# Patient Record
Sex: FEMALE | Race: WHITE | NOT HISPANIC OR LATINO | Employment: UNEMPLOYED | URBAN - METROPOLITAN AREA
[De-identification: names, ages, dates, MRNs, and addresses within clinical notes are randomized per-mention and may not be internally consistent; named-entity substitution may affect disease eponyms.]

---

## 2019-12-22 ENCOUNTER — APPOINTMENT (EMERGENCY)
Dept: CT IMAGING | Facility: HOSPITAL | Age: 54
End: 2019-12-22
Payer: MEDICARE

## 2019-12-22 ENCOUNTER — HOSPITAL ENCOUNTER (EMERGENCY)
Facility: HOSPITAL | Age: 54
Discharge: HOME | End: 2019-12-22
Attending: EMERGENCY MEDICINE | Admitting: EMERGENCY MEDICINE
Payer: MEDICARE

## 2019-12-22 VITALS
HEART RATE: 85 BPM | DIASTOLIC BLOOD PRESSURE: 58 MMHG | WEIGHT: 185.85 LBS | OXYGEN SATURATION: 94 % | HEIGHT: 67 IN | BODY MASS INDEX: 29.17 KG/M2 | TEMPERATURE: 98.4 F | RESPIRATION RATE: 16 BRPM | SYSTOLIC BLOOD PRESSURE: 124 MMHG
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
## 2019-12-22 DIAGNOSIS — R42 DIZZINESS: ICD-10-CM

## 2019-12-22 LAB
ALBUMIN SERPL BCP-MCNC: 3.8 G/DL (ref 3.5–5)
ALP SERPL-CCNC: 69 U/L (ref 46–116)
ALT SERPL W P-5'-P-CCNC: 13 U/L (ref 12–78)
ANION GAP SERPL CALCULATED.3IONS-SCNC: 12 MMOL/L (ref 4–13)
AST SERPL W P-5'-P-CCNC: 10 U/L (ref 5–45)
ATRIAL RATE: 107 BPM
BACTERIA UR QL AUTO: ABNORMAL /HPF
BASOPHILS # BLD AUTO: 0.04 THOUSANDS/ΜL (ref 0–0.1)
BASOPHILS NFR BLD AUTO: 1 % (ref 0–1)
BILIRUB SERPL-MCNC: 0.4 MG/DL (ref 0.2–1)
BILIRUB UR QL STRIP: NEGATIVE
BUN SERPL-MCNC: 14 MG/DL (ref 5–25)
CALCIUM SERPL-MCNC: 9.1 MG/DL (ref 8.3–10.1)
CHLORIDE SERPL-SCNC: 102 MMOL/L (ref 100–108)
CLARITY UR: CLEAR
CO2 SERPL-SCNC: 25 MMOL/L (ref 21–32)
COLOR UR: YELLOW
CREAT SERPL-MCNC: 0.78 MG/DL (ref 0.6–1.3)
EOSINOPHIL # BLD AUTO: 0.1 THOUSAND/ΜL (ref 0–0.61)
EOSINOPHIL NFR BLD AUTO: 1 % (ref 0–6)
ERYTHROCYTE [DISTWIDTH] IN BLOOD BY AUTOMATED COUNT: 12.9 % (ref 11.6–15.1)
ETHANOL SERPL-MCNC: <3 MG/DL (ref 0–3)
FLUAV RNA NPH QL NAA+PROBE: NORMAL
FLUBV RNA NPH QL NAA+PROBE: NORMAL
GFR SERPL CREATININE-BSD FRML MDRD: 85 ML/MIN/1.73SQ M
GLUCOSE SERPL-MCNC: 84 MG/DL (ref 65–140)
GLUCOSE UR STRIP-MCNC: NEGATIVE MG/DL
HCT VFR BLD AUTO: 38 % (ref 34.8–46.1)
HGB BLD-MCNC: 12.2 G/DL (ref 11.5–15.4)
HGB UR QL STRIP.AUTO: ABNORMAL
IMM GRANULOCYTES # BLD AUTO: 0.03 THOUSAND/UL (ref 0–0.2)
IMM GRANULOCYTES NFR BLD AUTO: 0 % (ref 0–2)
KETONES UR STRIP-MCNC: NEGATIVE MG/DL
LEUKOCYTE ESTERASE UR QL STRIP: NEGATIVE
LYMPHOCYTES # BLD AUTO: 2.01 THOUSANDS/ΜL (ref 0.6–4.47)
LYMPHOCYTES NFR BLD AUTO: 27 % (ref 14–44)
MCH RBC QN AUTO: 32.1 PG (ref 26.8–34.3)
MCHC RBC AUTO-ENTMCNC: 32.1 G/DL (ref 31.4–37.4)
MCV RBC AUTO: 100 FL (ref 82–98)
MONOCYTES # BLD AUTO: 0.64 THOUSAND/ΜL (ref 0.17–1.22)
MONOCYTES NFR BLD AUTO: 9 % (ref 4–12)
NEUTROPHILS # BLD AUTO: 4.58 THOUSANDS/ΜL (ref 1.85–7.62)
NEUTS SEG NFR BLD AUTO: 62 % (ref 43–75)
NITRITE UR QL STRIP: NEGATIVE
NON-SQ EPI CELLS URNS QL MICRO: ABNORMAL /HPF
NRBC BLD AUTO-RTO: 0 /100 WBCS
NT-PROBNP SERPL-MCNC: 138 PG/ML
P AXIS: 67 DEGREES
PH UR STRIP.AUTO: 5 [PH]
PLATELET # BLD AUTO: 360 THOUSANDS/UL (ref 149–390)
PMV BLD AUTO: 9.6 FL (ref 8.9–12.7)
POTASSIUM SERPL-SCNC: 3.4 MMOL/L (ref 3.5–5.3)
PR INTERVAL: 124 MS
PROT SERPL-MCNC: 8 G/DL (ref 6.4–8.2)
PROT UR STRIP-MCNC: NEGATIVE MG/DL
QRS AXIS: 66 DEGREES
QRSD INTERVAL: 76 MS
QT INTERVAL: 352 MS
QTC INTERVAL: 469 MS
RBC # BLD AUTO: 3.8 MILLION/UL (ref 3.81–5.12)
RBC #/AREA URNS AUTO: ABNORMAL /HPF
RSV RNA NPH QL NAA+PROBE: NORMAL
S PYO DNA THROAT QL NAA+PROBE: NORMAL
SODIUM SERPL-SCNC: 139 MMOL/L (ref 136–145)
SP GR UR STRIP.AUTO: <=1.005 (ref 1–1.03)
T WAVE AXIS: 48 DEGREES
TROPONIN I SERPL-MCNC: <0.02 NG/ML
UROBILINOGEN UR QL STRIP.AUTO: 0.2 E.U./DL
VENTRICULAR RATE: 107 BPM
WBC # BLD AUTO: 7.4 THOUSAND/UL (ref 4.31–10.16)
WBC #/AREA URNS AUTO: ABNORMAL /HPF

## 2019-12-22 PROCEDURE — 71260 CT THORAX DX C+: CPT

## 2019-12-22 PROCEDURE — 87651 STREP A DNA AMP PROBE: CPT | Performed by: EMERGENCY MEDICINE

## 2019-12-22 PROCEDURE — 72125 CT NECK SPINE W/O DYE: CPT

## 2019-12-22 PROCEDURE — 80320 DRUG SCREEN QUANTALCOHOLS: CPT | Performed by: EMERGENCY MEDICINE

## 2019-12-22 PROCEDURE — 85025 COMPLETE CBC W/AUTO DIFF WBC: CPT | Performed by: EMERGENCY MEDICINE

## 2019-12-22 PROCEDURE — 84484 ASSAY OF TROPONIN QUANT: CPT | Performed by: EMERGENCY MEDICINE

## 2019-12-22 PROCEDURE — 83880 ASSAY OF NATRIURETIC PEPTIDE: CPT | Performed by: EMERGENCY MEDICINE

## 2019-12-22 PROCEDURE — 99284 EMERGENCY DEPT VISIT MOD MDM: CPT

## 2019-12-22 PROCEDURE — 36415 COLL VENOUS BLD VENIPUNCTURE: CPT | Performed by: EMERGENCY MEDICINE

## 2019-12-22 PROCEDURE — 74177 CT ABD & PELVIS W/CONTRAST: CPT

## 2019-12-22 PROCEDURE — 81001 URINALYSIS AUTO W/SCOPE: CPT | Performed by: EMERGENCY MEDICINE

## 2019-12-22 PROCEDURE — 99285 EMERGENCY DEPT VISIT HI MDM: CPT | Performed by: EMERGENCY MEDICINE

## 2019-12-22 PROCEDURE — 87631 RESP VIRUS 3-5 TARGETS: CPT | Performed by: EMERGENCY MEDICINE

## 2019-12-22 PROCEDURE — 96360 HYDRATION IV INFUSION INIT: CPT

## 2019-12-22 PROCEDURE — 70450 CT HEAD/BRAIN W/O DYE: CPT

## 2019-12-22 PROCEDURE — 93005 ELECTROCARDIOGRAM TRACING: CPT

## 2019-12-22 PROCEDURE — 93010 ELECTROCARDIOGRAM REPORT: CPT | Performed by: INTERNAL MEDICINE

## 2019-12-22 PROCEDURE — 80053 COMPREHEN METABOLIC PANEL: CPT | Performed by: EMERGENCY MEDICINE

## 2019-12-22 RX ORDER — HYDROXYZINE HYDROCHLORIDE 25 MG/1
25 TABLET, FILM COATED ORAL ONCE
Status: COMPLETED | OUTPATIENT
Start: 2019-12-22 | End: 2019-12-22

## 2019-12-22 RX ORDER — POTASSIUM CHLORIDE 20 MEQ/1
20 TABLET, EXTENDED RELEASE ORAL ONCE
Status: COMPLETED | OUTPATIENT
Start: 2019-12-22 | End: 2019-12-22

## 2019-12-22 RX ADMIN — POTASSIUM CHLORIDE 20 MEQ: 1500 TABLET, EXTENDED RELEASE ORAL at 19:09

## 2019-12-22 RX ADMIN — HYDROXYZINE HYDROCHLORIDE 25 MG: 25 TABLET ORAL at 18:13

## 2019-12-22 RX ADMIN — SODIUM CHLORIDE 1000 ML: 0.9 INJECTION, SOLUTION INTRAVENOUS at 17:56

## 2019-12-22 RX ADMIN — IOHEXOL 100 ML: 350 INJECTION, SOLUTION INTRAVENOUS at 19:10

## 2019-12-22 NOTE — ED NOTES
Pt reports that she has been "sneezing out worms" and that they are all over her house   Pt states "I could blow my nose and blow out 4 at a time "      [address], RN  12/22/19 6978

## 2019-12-22 NOTE — ED PROVIDER NOTES
History  Chief Complaint   Patient presents with    Fall     pt post dizziness and fall earlier today, pt denies thinners, unsure of the head injury "i have worms in my mouth and throat" , pt co pain in neck and head        57-year-old female presents with dizziness and a fall earlier today  Patient has had some confusion over the past few months  Patient's  states that she will wander, get confused, is very forgetful  This is not an acute change  Earlier today she had dizziness and had a fall  She is presenting with head   And neck pain  She has no chest pain  Patient is also complaining she has worms in her chest and is coughing them up   states that these "worms "are mucus   From nasal congestion  She has no fevers or chills  No recent travel  No sick contacts  None       Past Medical History:   Diagnosis Date    Chronic back pain     Hypertension     Migraine     Non Hodgkin's lymphoma ([facility] [address] )        History reviewed  No pertinent surgical history  History reviewed  No pertinent family history  I have reviewed and agree with the history as documented  Social History     Tobacco Use    Smoking status: Never Smoker    Smokeless tobacco: Never Used   Substance Use Topics    Alcohol use: Not Currently    Drug use: Never        Review of Systems   Constitutional: Negative for chills, fatigue and fever  HENT: Negative for congestion and sore throat  Respiratory: Positive for cough  Negative for chest tightness, shortness of breath and wheezing  Cardiovascular: Negative for chest pain and palpitations  Gastrointestinal: Negative for abdominal pain, constipation, diarrhea, nausea and vomiting  Genitourinary: Negative for dysuria and flank pain  Musculoskeletal: Positive for neck pain  Negative for back pain  Skin: Negative for color change and rash  Allergic/Immunologic: Negative for immunocompromised state     Neurological: Positive for dizziness, light-headedness and headaches  Negative for seizures, syncope, facial asymmetry, weakness and numbness  Psychiatric/Behavioral: Positive for confusion and hallucinations  Physical Exam  Physical Exam   Constitutional: She is oriented to person, place, and time  She appears well-developed and well-nourished  No distress  HENT:   Head: Normocephalic and atraumatic  Mouth/Throat: Oropharynx is clear and moist    No external evidence of trauma  No hematotympanum  Eyes: Pupils are equal, round, and reactive to light  Conjunctivae and EOM are normal  Right eye exhibits no discharge  Left eye exhibits no discharge  No scleral icterus  Neck: Normal range of motion  Neck supple  No JVD present  Midline cervical tenderness noted  No step-offs  Cardiovascular: Normal rate, regular rhythm, normal heart sounds and intact distal pulses  Exam reveals no gallop and no friction rub  No murmur heard  Pulmonary/Chest: Effort normal and breath sounds normal  No respiratory distress  She has no wheezes  She has no rales  She exhibits no tenderness  No rhonchi  Abdominal: Soft  Bowel sounds are normal  She exhibits no distension  There is no tenderness  There is no guarding  Pelvis is stable, nontender  Musculoskeletal: Normal range of motion  She exhibits no edema, tenderness or deformity  Neurological: She is alert and oriented to person, place, and time  No cranial nerve deficit or sensory deficit  Skin: Skin is warm and dry  No rash noted  She is not diaphoretic  No erythema  No pallor  Psychiatric:   Patient continues to state that she has worms that she is coughing up  These are not noted on physical exam    Vitals reviewed        Vital Signs  ED Triage Vitals   Temperature Pulse Respirations Blood Pressure SpO2   12/22/19 1739 12/22/19 1732 12/22/19 1732 12/22/19 1732 12/22/19 1732   98 4 °F (36 9 °C) (!) 110 17 150/68 96 %      Temp Source Heart Rate Source Patient Position - Orthostatic VS BP Location FiO2 (%)   12/22/19 1732 12/22/19 1732 12/22/19 1732 12/22/19 1732 --   Oral Monitor Sitting Right arm       Pain Score       12/22/19 1915       No Pain           Vitals:    12/22/19 1800 12/22/19 1830 12/22/19 1915 12/22/19 2000   BP: 137/63 147/60 146/70 124/58   Pulse: 89 84 89 85   Patient Position - Orthostatic VS:             Visual Acuity  Visual Acuity      Most Recent Value   L Pupil Size (mm)  3   R Pupil Size (mm)  3          ED Medications  Medications   sodium chloride 0 9 % bolus 1,000 mL (0 mL Intravenous Stopped 12/22/19 1856)   hydrOXYzine HCL (ATARAX) tablet 25 mg (25 mg Oral Given 12/22/19 1813)   potassium chloride (K-DUR,KLOR-CON) CR tablet 20 mEq (20 mEq Oral Given 12/22/19 1909)   iohexol (OMNIPAQUE) 350 MG/ML injection (MULTI-DOSE) 100 mL (100 mL Intravenous Given 12/22/19 1910)       Diagnostic Studies  Results Reviewed     Procedure Component Value Units Date/Time    Urine Microscopic [[id number]]  (Abnormal) Collected:  12/22/19 1945    Lab Status:  Final result Specimen:  Urine, Clean Catch Updated:  12/22/19 2007     RBC, UA 2-4 /hpf      WBC, UA 2-4 /hpf      Epithelial Cells Occasional /hpf      Bacteria, UA None Seen /hpf     UA w Reflex to Microscopic w Reflex to Culture [[id number]]  (Abnormal) Collected:  12/22/19 1945    Lab Status:  Final result Specimen:  Urine, Clean Catch Updated:  12/22/19 1957     Color, UA Yellow     Clarity, UA Clear     Specific Gravity, UA <=1 005     pH, UA 5 0     Leukocytes, UA Negative     Nitrite, UA Negative     Protein, UA Negative mg/dl      Glucose, UA Negative mg/dl      Ketones, UA Negative mg/dl      Urobilinogen, UA 0 2 E U /dl      Bilirubin, UA Negative     Blood, UA Trace-Intact    Influenza A/B and RSV PCR [[id number]]  (Normal) Collected:  12/22/19 1750    Lab Status:  Final result Specimen:  Nose Updated:  12/22/19 1855     INFLUENZA A PCR None Detected     INFLUENZA B PCR None Detected     RSV PCR None Detected    Strep A PCR [[id number]]  (Normal) Collected:  12/22/19 1750    Lab Status:  Final result Specimen:  Throat Updated:  12/22/19 1854     STREP A PCR None Detected    NT-BNP PRO [[id number]]  (Abnormal) Collected:  12/22/19 1751    Lab Status:  Final result Specimen:  Blood from Arm, Left Updated:  12/22/19 1827     NT-proBNP 138 pg/mL     Troponin I [[id number]]  (Normal) Collected:  12/22/19 1751    Lab Status:  Final result Specimen:  Blood from Arm, Left Updated:  12/22/19 1821     Troponin I <0 02 ng/mL     Comprehensive metabolic panel [[id number]]  (Abnormal) Collected:  12/22/19 1751    Lab Status:  Final result Specimen:  Blood from Arm, Left Updated:  12/22/19 1820     Sodium 139 mmol/L      Potassium 3 4 mmol/L      Chloride 102 mmol/L      CO2 25 mmol/L      ANION GAP 12 mmol/L      BUN 14 mg/dL      Creatinine 0 78 mg/dL      Glucose 84 mg/dL      Calcium 9 1 mg/dL      AST 10 U/L      ALT 13 U/L      Alkaline Phosphatase 69 U/L      Total Protein 8 0 g/dL      Albumin 3 8 g/dL      Total Bilirubin 0 40 mg/dL      eGFR 85 ml/min/1 73sq m     Narrative:       [place] guidelines for Chronic Kidney Disease (CKD):     Stage 1 with normal or high GFR (GFR > 90 mL/min/1 73 square meters)    Stage 2 Mild CKD (GFR = 60-89 mL/min/1 73 square meters)    Stage 3A Moderate CKD (GFR = 45-59 mL/min/1 73 square meters)    Stage 3B Moderate CKD (GFR = 30-44 mL/min/1 73 square meters)    Stage 4 Severe CKD (GFR = 15-29 mL/min/1 73 square meters)    Stage 5 End Stage CKD (GFR <15 mL/min/1 73 square meters)  Note: GFR calculation is accurate only with a steady state creatinine    Ethanol [[id number]]  (Normal) Collected:  12/22/19 1752    Lab Status:  Final result Specimen:  Blood from Arm, Left Updated:  12/22/19 1815     Ethanol Lvl <3 mg/dL     CBC and differential [[id number]]  (Abnormal) Collected:  12/22/19 1751    Lab Status:  Final result Specimen:  Blood from Arm, Left Updated:  12/22/19 1801 WBC 7 40 Thousand/uL      RBC 3 80 Million/uL      Hemoglobin 12 2 g/dL      Hematocrit 38 0 %       fL      MCH 32 1 pg      MCHC 32 1 g/dL      RDW 12 9 %      MPV 9 6 fL      Platelets 542 Thousands/uL      nRBC 0 /100 WBCs      Neutrophils Relative 62 %      Immat GRANS % 0 %      Lymphocytes Relative 27 %      Monocytes Relative 9 %      Eosinophils Relative 1 %      Basophils Relative 1 %      Neutrophils Absolute 4 58 Thousands/µL      Immature Grans Absolute 0 03 Thousand/uL      Lymphocytes Absolute 2 01 Thousands/µL      Monocytes Absolute 0 64 Thousand/µL      Eosinophils Absolute 0 10 Thousand/µL      Basophils Absolute 0 04 Thousands/µL                  CT head without contrast   ED Interpretation by Marcus Knapp DO (12/22 1939)   No acute intracranial abnormality  Final Result by Faby Sanz DO (12/22 1932)      No acute intracranial abnormality  Workstation performed: [id number]         CT spine cervical without contrast   ED Interpretation by Marcus Knapp DO (12/22 1939)   No cervical spine fracture or traumatic malalignment  Final Result by Faby Sanz DO (12/22 1937)      No cervical spine fracture or traumatic malalignment  Workstation performed: [id number]         CT chest abdomen pelvis w contrast   ED Interpretation by Marcus Knapp DO (12/22 2009)   1  No acute traumatic injury identified within the chest, abdomen or pelvis        2  Intra- and extrahepatic biliary dilatation of uncertain etiology  Correlation with any obstructive enzyme levels and follow-up nonemergent MRI/MRCP with IV contrast recommended        3   7 mm upper lobe groundglass density  Based on recent [place] on incidental pulmonary nodule, no further workup is necessary  Final Result by Faby Sanz DO (12/22 1954)      1  No acute traumatic injury identified within the chest, abdomen or pelvis        2  Intra- and extrahepatic biliary dilatation of uncertain etiology  Correlation with any obstructive enzyme levels and follow-up nonemergent MRI/MRCP with IV contrast recommended  3   7 mm upper lobe groundglass density  Based on recent [place] on incidental pulmonary nodule, no further workup is necessary  The study was marked in [facility] for follow-up  Workstation performed: [id number]                    Procedures  ECG 12 Lead Documentation Only  Date/Time: 12/22/2019 5:45 PM  Performed by: Marcus Knapp DO  Authorized by: Marcus Knapp DO     Indications / Diagnosis:  AMS  syncope  ECG reviewed by me, the ED Provider: yes    Patient location:  ED  Previous ECG:     Previous ECG:  Unavailable    Comparison to cardiac monitor: Yes    Interpretation:     Interpretation: non-specific    Rate:     ECG rate:  107    ECG rate assessment: tachycardic    Rhythm:     Rhythm: sinus tachycardia    Ectopy:     Ectopy: none    QRS:     QRS axis:  Normal    QRS intervals:  Normal  Conduction:     Conduction: normal    ST segments:     ST segments:  Normal  T waves:     T waves: normal               ED Course  ED Course as of Dec 28 0920   Sun Dec 22, 2019   1839 Potassium(!): 3 4   1932 STREP A PCR: None Detected   2008 Bacteria, UA: None Seen   2020 Patient is offered and encouraged admission but she wants to be discharged home  Wants to see a parasite doctor  Concerned for delusional parasitosis                                  MDM  Number of Diagnoses or Management Options  Dizziness:   Fall, initial encounter:   Diagnosis management comments:   Patient has dizziness which resulted in a fall  She does have confusion and some psychiatric complaints  Patient is urged admission however she refuses and would like to be discharged home  She states she feels safe at home  She is able to walk steadily  She has her  and daughter at home to help her    No evidence of injury from her fall earlier today  She denies thoughts of harming herself or anyone else  She has no medical cause for dizziness or   Falls  Will be discharged home but advised good return precautions for worsening condition, falls, or if she would like psychiatric evaluation  Urged her  as well to return her here if she is not safe at home  Amount and/or Complexity of Data Reviewed  Clinical lab tests: ordered and reviewed  Tests in the radiology section of CPT®: ordered and reviewed          Disposition  Final diagnoses:   Fall, initial encounter   Dizziness     Time reflects when diagnosis was documented in both MDM as applicable and the Disposition within this note     Time User Action Codes Description Comment    12/22/2019  8:20 PM Lexis Gill Add [W19  UAFC] Fall, initial encounter     12/22/2019  8:20 PM Lexis Gill Add [R42] Dizziness       ED Disposition     ED Disposition Condition Date/Time Comment    Discharge Stable Sun Dec 22, 2019  8:19 PM [facility] discharge to home/self care  Follow-up Information     Follow up With Specialties Details Why [address],  Internal Medicine Schedule an appointment as soon as possible for a visit  For follow up to ensure improvement, and for further testing and treatment as needed  This will include further referral if you do not have resolutation of your symptoms, as well as furhter work up regarding abdominal scan [address]            There are no discharge medications for this patient  No discharge procedures on file      ED Provider  Electronically Signed by           Baljit Penny DO  12/28/19 5777

## 2021-03-01 ENCOUNTER — OFFICE VISIT (OUTPATIENT)
Dept: URGENT CARE | Facility: CLINIC | Age: 56
End: 2021-03-01
Payer: MEDICARE

## 2021-03-01 VITALS
TEMPERATURE: 98.2 F | DIASTOLIC BLOOD PRESSURE: 87 MMHG | SYSTOLIC BLOOD PRESSURE: 165 MMHG | WEIGHT: 185 LBS | BODY MASS INDEX: 29.03 KG/M2 | RESPIRATION RATE: 18 BRPM | HEART RATE: 93 BPM | HEIGHT: 67 IN | OXYGEN SATURATION: 97 %

## 2021-03-01 DIAGNOSIS — K08.89 TOOTH ACHE: Primary | ICD-10-CM

## 2021-03-01 PROCEDURE — 99213 OFFICE O/P EST LOW 20 MIN: CPT | Performed by: PREVENTIVE MEDICINE

## 2021-03-01 PROCEDURE — G0463 HOSPITAL OUTPT CLINIC VISIT: HCPCS | Performed by: PREVENTIVE MEDICINE

## 2021-03-01 RX ORDER — DULOXETIN HYDROCHLORIDE 60 MG/1
CAPSULE, DELAYED RELEASE ORAL
COMMUNITY
Start: 2021-02-19

## 2021-03-01 RX ORDER — CARISOPRODOL 350 MG/1
350 TABLET ORAL 4 TIMES DAILY
COMMUNITY
Start: 2021-02-05

## 2021-03-01 RX ORDER — OXYCODONE AND ACETAMINOPHEN 10; 325 MG/1; MG/1
1 TABLET ORAL
COMMUNITY
Start: 2021-02-13 | End: 2021-06-12 | Stop reason: ALTCHOICE

## 2021-03-01 RX ORDER — DIAZEPAM 5 MG/1
5 TABLET ORAL
COMMUNITY
Start: 2021-02-11

## 2021-03-01 RX ORDER — AMOXICILLIN AND CLAVULANATE POTASSIUM 875; 125 MG/1; MG/1
1 TABLET, FILM COATED ORAL EVERY 12 HOURS SCHEDULED
Qty: 14 TABLET | Refills: 0 | Status: SHIPPED | OUTPATIENT
Start: 2021-03-01 | End: 2021-03-08

## 2021-03-01 RX ORDER — VERAPAMIL HYDROCHLORIDE 120 MG/1
360 TABLET, FILM COATED ORAL DAILY
COMMUNITY
Start: 2020-12-21

## 2021-03-01 RX ORDER — ZOLPIDEM TARTRATE 10 MG/1
10 TABLET ORAL
COMMUNITY
Start: 2021-02-11

## 2021-03-01 NOTE — PATIENT INSTRUCTIONS
Gingivostomatitis   AMBULATORY CARE:   Gingivostomatitis (GS)  is a condition that causes painful sores on the lips, tongue, gums, and inside the mouth  GS is caused by the herpes simplex virus  The virus spreads easily from person to person through saliva or shared objects  The sores usually heal within 2 weeks with treatment  Common signs and symptoms of GS:   · Sores, ulcers, or blisters in your mouth    · Irritated gums    · Sore throat    · Fever, headache, fatigue    · Nausea, vomiting, or swollen lymph glands    Seek care immediately if:   · You have severe pain  Contact your healthcare provider if:   · Your fever or other symptoms return after treatment  · You are urinating less than usual     · Your mouth sores are draining pus or blood  · You have questions or concerns about your condition or care  Treatment  may include any of the following:  · Acetaminophen  decreases pain and fever  It is available without a doctor's order  Ask how much to take and how often to take it  Follow directions  Acetaminophen can cause liver damage if not taken correctly  · NSAIDs , such as ibuprofen, help decrease swelling, pain, and fever  This medicine is available with or without a doctor's order  NSAIDs can cause stomach bleeding or kidney problems in certain people  If you take blood thinner medicine, always ask your healthcare provider if NSAIDs are safe for you  Always read the medicine label and follow directions  · Numbing medicine  helps decrease pain from your mouth sores  This medicine is usually a liquid that you swish in your mouth and then spit out  · Antiviral medicine  helps treat a viral infection  Manage your symptoms:   · Brush your teeth at least 2 times each day  Floss at least 1 time each day  If you wear dentures, make sure they fit properly  · Drink liquids as directed to prevent dehydration  It is important to drink liquids even though your mouth is sore   Ask how much liquid to drink each day and which liquids are best for you  · Eat a variety of healthy foods  You may need to eat bland foods until your pain gets better  Healthy foods include fruits, vegetables, whole-grain breads, low-fat dairy products, beans, lean meats, and fish  Do not eat spicy, dry, hard, or acidic foods, such as oranges  · Do not smoke  Nicotine and other chemicals in cigarettes and cigars can cause mouth and lung damage  Ask your healthcare provider for information if you currently smoke and need help to quit  E-cigarettes or smokeless tobacco still contain nicotine  Talk to your healthcare provider before you use these products  Follow up with your healthcare provider as directed:  Write down your questions so you remember to ask them during your visits  © Copyright [address] Information is for End User's use only and may not be sold, redistributed or otherwise used for commercial purposes  All illustrations and images included in CareNotes® are the copyrighted property of A D A M , Inc  or [address] [place] [address]  The above information is an  only  It is not intended as medical advice for individual conditions or treatments  Talk to your doctor, nurse or pharmacist before following any medical regimen to see if it is safe and effective for you

## 2021-03-06 NOTE — PROGRESS NOTES
[facility]        NAME: Batsheva Rahman is a 60 y o  female  : 1965    MRN: [id number]  DATE: 2021  TIME: 9:58 AM    Assessment and Plan   Tooth ache [K08 89]  1  Tooth ache  amoxicillin-clavulanate (AUGMENTIN) 875-125 mg per tablet         Patient Instructions       Follow up with PCP in 3-5 days  Proceed to  ER if symptoms worsen  Chief Complaint     Chief Complaint   Patient presents with    Dental Pain     started saturday     Facial Swelling         History of Present Illness       Dental Pain   This is a new problem  The current episode started in the past 7 days  The problem occurs constantly  The problem has been unchanged  The pain is at a severity of 8/10  The pain is severe  Associated symptoms include facial pain  She has tried acetaminophen and NSAIDs for the symptoms  The treatment provided no relief  Review of Systems   Review of Systems   Constitutional: Negative  HENT: Positive for dental problem and facial swelling  Eyes: Negative  Respiratory: Negative  Cardiovascular: Negative  All other systems reviewed and are negative          Current Medications       Current Outpatient Medications:     amoxicillin-clavulanate (AUGMENTIN) 875-125 mg per tablet, Take 1 tablet by mouth every 12 (twelve) hours for 7 days, Disp: 14 tablet, Rfl: 0    carisoprodol (SOMA) 350 mg tablet, Take 350 mg by mouth 4 (four) times a day, Disp: , Rfl:     diazepam (VALIUM) 5 mg tablet, Take 5 mg by mouth daily at bedtime, Disp: , Rfl:     DULoxetine (CYMBALTA) 60 mg delayed release capsule, , Disp: , Rfl:     oxyCODONE-acetaminophen (PERCOCET)  mg per tablet, Take 1 tablet by mouth every 4 to 6 hours if needed for pain, Disp: , Rfl:     verapamil (CALAN) 120 mg tablet, Take 360 mg by mouth daily, Disp: , Rfl:     zolpidem (AMBIEN) 10 mg tablet, Take 10 mg by mouth daily at bedtime, Disp: , Rfl:     Current Allergies     Allergies as of 2021 - Reviewed 03/01/2021   Allergen Reaction Noted    Morphine  12/22/2019    Prednisone  06/11/2015            The following portions of the patient's history were reviewed and updated as appropriate: allergies, current medications, past family history, past medical history, past social history, past surgical history and problem list      Past Medical History:   Diagnosis Date    Chronic back pain     Hypertension     Migraine     Non Hodgkin's lymphoma ([facility] [address] )        Past Surgical History:   Procedure Laterality Date    HYSTERECTOMY      TONSILLECTOMY         History reviewed  No pertinent family history  Medications have been verified  Objective   /87   Pulse 93   Temp 98 2 °F (36 8 °C) (Temporal)   Resp 18   Ht 5' 7" (1 702 m)   Wt 83 9 kg (185 lb)   LMP  (LMP Unknown)   SpO2 97%   BMI 28 98 kg/m²        Physical Exam     Physical Exam  Vitals signs and nursing note reviewed  Constitutional:       Appearance: She is well-developed  HENT:      Mouth/Throat:      Mouth: Mucous membranes are moist       Dentition: Dental tenderness and gingival swelling present  Pharynx: Oropharynx is clear  Cardiovascular:      Rate and Rhythm: Normal rate and regular rhythm  Pulmonary:      Effort: Pulmonary effort is normal       Breath sounds: Normal breath sounds

## 2021-06-11 ENCOUNTER — TELEPHONE (OUTPATIENT)
Dept: OBGYN CLINIC | Facility: CLINIC | Age: 56
End: 2021-06-11

## 2021-06-11 ENCOUNTER — OFFICE VISIT (OUTPATIENT)
Dept: URGENT CARE | Facility: CLINIC | Age: 56
End: 2021-06-11
Payer: MEDICARE

## 2021-06-11 ENCOUNTER — APPOINTMENT (EMERGENCY)
Dept: RADIOLOGY | Facility: HOSPITAL | Age: 56
End: 2021-06-11
Payer: MEDICARE

## 2021-06-11 ENCOUNTER — APPOINTMENT (OUTPATIENT)
Dept: RADIOLOGY | Facility: CLINIC | Age: 56
End: 2021-06-11
Payer: MEDICARE

## 2021-06-11 ENCOUNTER — HOSPITAL ENCOUNTER (EMERGENCY)
Facility: HOSPITAL | Age: 56
End: 2021-06-11
Attending: EMERGENCY MEDICINE | Admitting: EMERGENCY MEDICINE
Payer: MEDICARE

## 2021-06-11 ENCOUNTER — TELEPHONE (OUTPATIENT)
Dept: OBGYN CLINIC | Facility: HOSPITAL | Age: 56
End: 2021-06-11

## 2021-06-11 ENCOUNTER — HOSPITAL ENCOUNTER (EMERGENCY)
Facility: HOSPITAL | Age: 56
Discharge: HOME | End: 2021-06-11
Payer: MEDICARE

## 2021-06-11 VITALS
TEMPERATURE: 97.6 F | HEIGHT: 67 IN | OXYGEN SATURATION: 98 % | SYSTOLIC BLOOD PRESSURE: 161 MMHG | DIASTOLIC BLOOD PRESSURE: 76 MMHG | RESPIRATION RATE: 18 BRPM | HEART RATE: 75 BPM | BODY MASS INDEX: 28.25 KG/M2 | WEIGHT: 180 LBS

## 2021-06-11 VITALS
HEART RATE: 85 BPM | OXYGEN SATURATION: 95 % | BODY MASS INDEX: 28.18 KG/M2 | TEMPERATURE: 97.6 F | RESPIRATION RATE: 18 BRPM | SYSTOLIC BLOOD PRESSURE: 125 MMHG | DIASTOLIC BLOOD PRESSURE: 61 MMHG | WEIGHT: 179.9 LBS

## 2021-06-11 VITALS
SYSTOLIC BLOOD PRESSURE: 164 MMHG | DIASTOLIC BLOOD PRESSURE: 91 MMHG | RESPIRATION RATE: 18 BRPM | OXYGEN SATURATION: 97 % | TEMPERATURE: 97.6 F | HEART RATE: 91 BPM

## 2021-06-11 DIAGNOSIS — S99.911A INJURY OF RIGHT ANKLE, INITIAL ENCOUNTER: Primary | ICD-10-CM

## 2021-06-11 DIAGNOSIS — S82.841A CLOSED BIMALLEOLAR FRACTURE OF RIGHT ANKLE, INITIAL ENCOUNTER: Primary | ICD-10-CM

## 2021-06-11 DIAGNOSIS — S82.841A BIMALLEOLAR FRACTURE OF RIGHT ANKLE: Primary | ICD-10-CM

## 2021-06-11 DIAGNOSIS — S99.911A INJURY OF RIGHT ANKLE, INITIAL ENCOUNTER: ICD-10-CM

## 2021-06-11 PROCEDURE — G0463 HOSPITAL OUTPT CLINIC VISIT: HCPCS | Performed by: PHYSICIAN ASSISTANT

## 2021-06-11 PROCEDURE — NC001 PR NO CHARGE: Performed by: ORTHOPAEDIC SURGERY

## 2021-06-11 PROCEDURE — 96374 THER/PROPH/DIAG INJ IV PUSH: CPT

## 2021-06-11 PROCEDURE — 96375 TX/PRO/DX INJ NEW DRUG ADDON: CPT

## 2021-06-11 PROCEDURE — 96376 TX/PRO/DX INJ SAME DRUG ADON: CPT

## 2021-06-11 PROCEDURE — 73610 X-RAY EXAM OF ANKLE: CPT

## 2021-06-11 PROCEDURE — 73600 X-RAY EXAM OF ANKLE: CPT

## 2021-06-11 PROCEDURE — 99284 EMERGENCY DEPT VISIT MOD MDM: CPT

## 2021-06-11 PROCEDURE — 27810 TREATMENT OF ANKLE FRACTURE: CPT | Performed by: EMERGENCY MEDICINE

## 2021-06-11 PROCEDURE — 73560 X-RAY EXAM OF KNEE 1 OR 2: CPT

## 2021-06-11 PROCEDURE — 99285 EMERGENCY DEPT VISIT HI MDM: CPT

## 2021-06-11 PROCEDURE — 99152 MOD SED SAME PHYS/QHP 5/>YRS: CPT | Performed by: PHYSICIAN ASSISTANT

## 2021-06-11 PROCEDURE — 96361 HYDRATE IV INFUSION ADD-ON: CPT

## 2021-06-11 PROCEDURE — 99214 OFFICE O/P EST MOD 30 MIN: CPT | Performed by: PHYSICIAN ASSISTANT

## 2021-06-11 PROCEDURE — 99285 EMERGENCY DEPT VISIT HI MDM: CPT | Performed by: PHYSICIAN ASSISTANT

## 2021-06-11 RX ORDER — ONDANSETRON 2 MG/ML
4 INJECTION INTRAMUSCULAR; INTRAVENOUS ONCE
Status: COMPLETED | OUTPATIENT
Start: 2021-06-11 | End: 2021-06-11

## 2021-06-11 RX ORDER — NICOTINE 21 MG/24HR
21 PATCH, TRANSDERMAL 24 HOURS TRANSDERMAL ONCE
Status: DISCONTINUED | OUTPATIENT
Start: 2021-06-11 | End: 2021-06-11 | Stop reason: HOSPADM

## 2021-06-11 RX ORDER — LIDOCAINE HYDROCHLORIDE 10 MG/ML
20 INJECTION, SOLUTION EPIDURAL; INFILTRATION; INTRACAUDAL; PERINEURAL ONCE
Status: COMPLETED | OUTPATIENT
Start: 2021-06-11 | End: 2021-06-11

## 2021-06-11 RX ORDER — PROPOFOL 10 MG/ML
100 INJECTION, EMULSION INTRAVENOUS ONCE
Status: COMPLETED | OUTPATIENT
Start: 2021-06-11 | End: 2021-06-11

## 2021-06-11 RX ORDER — ACETAMINOPHEN 325 MG/1
975 TABLET ORAL EVERY 8 HOURS SCHEDULED
Status: DISCONTINUED | OUTPATIENT
Start: 2021-06-11 | End: 2021-06-11

## 2021-06-11 RX ORDER — BUPIVACAINE HYDROCHLORIDE 2.5 MG/ML
20 INJECTION, SOLUTION EPIDURAL; INFILTRATION; INTRACAUDAL ONCE
Status: COMPLETED | OUTPATIENT
Start: 2021-06-11 | End: 2021-06-11

## 2021-06-11 RX ORDER — HYDROMORPHONE HCL/PF 1 MG/ML
0.5 SYRINGE (ML) INJECTION ONCE
Status: COMPLETED | OUTPATIENT
Start: 2021-06-11 | End: 2021-06-11

## 2021-06-11 RX ORDER — OXYCODONE HYDROCHLORIDE 5 MG/1
5 TABLET ORAL ONCE
Status: COMPLETED | OUTPATIENT
Start: 2021-06-11 | End: 2021-06-11

## 2021-06-11 RX ORDER — HYDROMORPHONE HCL/PF 1 MG/ML
1 SYRINGE (ML) INJECTION ONCE
Status: COMPLETED | OUTPATIENT
Start: 2021-06-11 | End: 2021-06-11

## 2021-06-11 RX ORDER — LORAZEPAM 2 MG/ML
0.5 INJECTION INTRAMUSCULAR ONCE
Status: COMPLETED | OUTPATIENT
Start: 2021-06-11 | End: 2021-06-11

## 2021-06-11 RX ORDER — ACETAMINOPHEN 325 MG/1
975 TABLET ORAL ONCE
Status: COMPLETED | OUTPATIENT
Start: 2021-06-11 | End: 2021-06-11

## 2021-06-11 RX ADMIN — HYDROMORPHONE HYDROCHLORIDE 1 MG: 1 INJECTION, SOLUTION INTRAMUSCULAR; INTRAVENOUS; SUBCUTANEOUS at 14:28

## 2021-06-11 RX ADMIN — HYDROMORPHONE HYDROCHLORIDE 1 MG: 1 INJECTION, SOLUTION INTRAMUSCULAR; INTRAVENOUS; SUBCUTANEOUS at 21:10

## 2021-06-11 RX ADMIN — NICOTINE 21 MG: 21 PATCH, EXTENDED RELEASE TRANSDERMAL at 17:29

## 2021-06-11 RX ADMIN — ONDANSETRON 4 MG: 2 INJECTION INTRAMUSCULAR; INTRAVENOUS at 14:25

## 2021-06-11 RX ADMIN — PROPOFOL 100 MG: 10 INJECTION, EMULSION INTRAVENOUS at 15:23

## 2021-06-11 RX ADMIN — LIDOCAINE HYDROCHLORIDE 20 ML: 10 INJECTION, SOLUTION EPIDURAL; INFILTRATION; INTRACAUDAL; PERINEURAL at 14:29

## 2021-06-11 RX ADMIN — LORAZEPAM 0.5 MG: 2 INJECTION INTRAMUSCULAR; INTRAVENOUS at 21:11

## 2021-06-11 RX ADMIN — SODIUM CHLORIDE 500 ML: 0.9 INJECTION, SOLUTION INTRAVENOUS at 15:05

## 2021-06-11 RX ADMIN — HYDROMORPHONE HYDROCHLORIDE 0.5 MG: 1 INJECTION, SOLUTION INTRAMUSCULAR; INTRAVENOUS; SUBCUTANEOUS at 19:17

## 2021-06-11 RX ADMIN — BUPIVACAINE HYDROCHLORIDE 20 ML: 2.5 INJECTION, SOLUTION EPIDURAL; INFILTRATION; INTRACAUDAL at 21:10

## 2021-06-11 RX ADMIN — ACETAMINOPHEN 975 MG: 325 TABLET ORAL at 23:11

## 2021-06-11 RX ADMIN — HYDROMORPHONE HYDROCHLORIDE 0.5 MG: 1 INJECTION, SOLUTION INTRAMUSCULAR; INTRAVENOUS; SUBCUTANEOUS at 17:28

## 2021-06-11 RX ADMIN — OXYCODONE HYDROCHLORIDE 5 MG: 5 TABLET ORAL at 23:11

## 2021-06-11 NOTE — ED PROVIDER NOTES
History  Chief Complaint   Patient presents with    Ankle Injury     Pt reports coming from immediate care after being seen for a right ankle injury  Pt reports she was told to come to ED do to multiple fractures  Pt is splinted  Please also see separate PA note  History provided by:  Patient  Ankle Injury  Location:  Right ankle  Quality:  Swollen  Severity:  Moderate  Onset quality:  Sudden  Duration:  2 days  Timing:  Constant  Progression:  Worsening  Chronicity:  New      Prior to Admission Medications   Prescriptions Last Dose Informant Patient Reported? Taking? DULoxetine (CYMBALTA) 60 mg delayed release capsule   Yes No   carisoprodol (SOMA) 350 mg tablet   Yes No   Sig: Take 350 mg by mouth 4 (four) times a day   diazepam (VALIUM) 5 mg tablet   Yes No   Sig: Take 5 mg by mouth daily at bedtime   oxyCODONE-acetaminophen (PERCOCET)  mg per tablet   Yes No   Sig: Take 1 tablet by mouth every 4 to 6 hours if needed for pain   verapamil (CALAN) 120 mg tablet   Yes No   Sig: Take 360 mg by mouth daily   zolpidem (AMBIEN) 10 mg tablet   Yes No   Sig: Take 10 mg by mouth daily at bedtime      Facility-Administered Medications: None       Past Medical History:   Diagnosis Date    Chronic back pain     Hypertension     Migraine     Non Hodgkin's lymphoma ([facility] [address] )        Past Surgical History:   Procedure Laterality Date    HYSTERECTOMY      TONSILLECTOMY         History reviewed  No pertinent family history  I have reviewed and agree with the history as documented  E-Cigarette/Vaping    E-Cigarette Use Never User      E-Cigarette/Vaping Substances     Social History     Tobacco Use    Smoking status: Current Every Day Smoker     Packs/day: 0 25    Smokeless tobacco: Never Used   Substance Use Topics    Alcohol use: Not Currently    Drug use: Never       Review of Systems    Physical Exam  Physical Exam  Vitals signs reviewed  HENT:      Head: Normocephalic and atraumatic     Eyes: Extraocular Movements: Extraocular movements intact  Cardiovascular:      Rate and Rhythm: Normal rate and regular rhythm  Pulmonary:      Effort: Pulmonary effort is normal  No respiratory distress  Breath sounds: No wheezing  Abdominal:      General: There is no distension  Palpations: Abdomen is soft  Tenderness: There is no abdominal tenderness  Musculoskeletal:      Right knee: She exhibits normal range of motion and no swelling  No tenderness found  Right ankle: She exhibits decreased range of motion, swelling, ecchymosis and deformity  Tenderness  Lateral malleolus and medial malleolus tenderness found  No proximal fibula tenderness found  Skin:     General: Skin is warm  Neurological:      General: No focal deficit present  Mental Status: She is alert and oriented to person, place, and time     Psychiatric:         Mood and Affect: Mood normal          Vital Signs  ED Triage Vitals   Temperature Pulse Respirations Blood Pressure SpO2   06/11/21 1351 06/11/21 1351 06/11/21 1351 06/11/21 1351 06/11/21 1351   97 6 °F (36 4 °C) 86 18 (!) 187/86 99 %      Temp Source Heart Rate Source Patient Position - Orthostatic VS BP Location FiO2 (%)   06/11/21 1358 06/11/21 1351 -- 06/11/21 1351 --   Oral Monitor  Right arm       Pain Score       06/11/21 1728       7           Vitals:    06/11/21 1543 06/11/21 1548 06/11/21 1600 06/11/21 1615   BP: 136/69 157/77 151/73 142/72   Pulse: 85 84 83 83         Visual Acuity      ED Medications  Medications   HYDROmorphone (DILAUDID) injection 0 5 mg (has no administration in time range)   nicotine (NICODERM CQ) 21 mg/24 hr TD 24 hr patch 21 mg (21 mg Transdermal Medication Applied 6/11/21 1729)   HYDROmorphone (DILAUDID) injection 1 mg (1 mg Intravenous Given 6/11/21 1428)   ondansetron (ZOFRAN) injection 4 mg (4 mg Intravenous Given 6/11/21 1425)   lidocaine (PF) (XYLOCAINE-MPF) 1 % injection 20 mL (20 mL Infiltration Given by Other 6/11/21 1429)   propofol (DIPRIVAN) 200 MG/20ML bolus injection 100 mg (100 mg Intravenous Given by Other 21 1523)   sodium chloride 0 9 % bolus 500 mL (0 mL Intravenous Stopped 21 1546)   HYDROmorphone (DILAUDID) injection 0 5 mg (0 5 mg Intravenous Given 21 1728)       Diagnostic Studies  Results Reviewed     None                 XR ankle 2 views RIGHT   Final Result by Fatemeh Casarez MD ([id number])      Evidence of unsuccessful ankle reduction  Workstation performed: [id number]         XR ankle 2 views RIGHT    (Results Pending)              Procedures  Pre-Procedural Sedation  Performed by: Melquiades More MD  Authorized by: Melquiades More MD     Consent:     Consent obtained:  Written    Consent given by:  Patient    Risks discussed:   Allergic reaction, prolonged hypoxia resulting in organ damage, prolonged sedation necessitating reversal, respiratory compromise necessitating ventilatory assistance and intubation, nausea, inadequate sedation and vomiting    Alternatives discussed:  Analgesia without sedation and regional anesthesia (first attempt with hematoma block and analgesia)  Universal protocol:     Patient identity confirmation method:  Verbally with patient  Indications:     Sedation purpose:  Fracture reduction    Procedure necessitating sedation performed by:  Different physician    Intended level of sedation:  Moderate (conscious sedation)  Pre-sedation assessment:     Time since last food or drink:  N/a    NPO status caution: urgency dictates proceeding with non-ideal NPO status      ASA classification: class 2 - patient with mild systemic disease      Neck mobility: normal      Mouth openin finger widths    Thyromental distance:  3 finger widths    Mallampati score:  III - soft palate, base of uvula visible    Pre-sedation assessments completed and reviewed: airway patency, cardiovascular function, hydration status, mental status, nausea/vomiting, pain level, respiratory function and temperature      History of difficult intubation: no      Pre-sedation assessment completed:  6/11/2021 3:00 PM  Procedural Sedation    Date/Time: 6/11/2021 3:13 PM  Performed by: Maksim Modi MD  Authorized by: Maksim Modi MD     Immediate pre-procedure details:     Reassessment: Patient reassessed immediately prior to procedure      Reviewed: vital signs    Procedure details (see MAR for exact dosages):     Sedation start time:  6/11/2021 3:23 PM    Preoxygenation:  Nasal cannula    Sedation:  Propofol    Analgesia: had hematoma block and dilaudid prior  Intra-procedure monitoring:  Blood pressure monitoring, cardiac monitor, continuous pulse oximetry, frequent LOC assessments and frequent vital sign checks    Intra-procedure events: none      Intra-procedure events comment:  Mild hypoxia to 88 briefly, jaw repositioned and sats maintained    Intra-procedure management:  Airway repositioning    Sedation end time:  6/11/2021 3:38 PM    Total sedation time (minutes):  15  Post-procedure details:     Post-sedation assessment completed:  6/11/2021 3:50 PM    Attendance: Constant attendance by certified staff until patient recovered      Recovery: Patient returned to pre-procedure baseline      Post-sedation assessments completed and reviewed: airway patency and mental status      Patient is stable for discharge or admission: yes      Patient tolerance:   Tolerated well, no immediate complications             ED Course                                           MDM  Number of Diagnoses or Management Options  Bimalleolar fracture of right ankle: new and requires workup     Amount and/or Complexity of Data Reviewed  Tests in the radiology section of CPT®: ordered and reviewed  Independent visualization of images, tracings, or specimens: yes    Risk of Complications, Morbidity, and/or Mortality  Presenting problems: high  Diagnostic procedures: high  Management options: high    Patient Progress  Patient progress: stable      Disposition  Final diagnoses:   Bimalleolar fracture of right ankle     Time reflects when diagnosis was documented in both MDM as applicable and the Disposition within this note     Time User Action Codes Description Comment    6/11/2021  3:10 PM Selin Hernandez Add [P52 549Z] Bimalleolar fracture of right ankle       ED Disposition     ED Disposition Condition Date/Time Comment    Transfer to Another Facility-In Network  Fri Jun 11, 2021  5:12 PM Pamela Colvin [place] should be transferred out to [facility]          MD Documentation      Most Recent Value   Patient Condition  The patient has been stabilized such that within reasonable medical probability, no material deterioration of the patient condition or the condition of the unborn child(lian) is likely to result from the transfer   Reason for Transfer  Level of Care needed not available at this facility   Benefits of Transfer  Specialized equipment and/or services available at the receiving facility (Include comment)________________________   Risks of Transfer  Potential for delay in receiving treatment, Potential deterioration of medical condition, Loss of IV   Accepting Physician  Dr Dutch Cheung   Sending MD Dr Luis Armando Gloria   Provider Certification  General risk, such as traffic hazards, adverse weather conditions, rough terrain or turbulence, possible failure of equipment (including vehicle or aircraft), or consequences of actions of persons outside the control of the transport personnel      Follow-up Information     Follow up With Specialties Details Why Contact Info Additional [address] [facility] Schedule an appointment as soon as possible for a visit   [address] [facility] [address]  [address] [facility], [address], [place], [place], [address] [phone]          Patient's Medications   Discharge Prescriptions    No medications on file     No discharge procedures on file      PDMP Review     None          ED Provider  Electronically Signed by           Wilmer Cheng MD  06/11/21 8851

## 2021-06-11 NOTE — PROGRESS NOTES
[facility]        NAME: Love Medellin is a 60 y o  female  : 1965    MRN: [id number]  DATE: 2021  TIME: 1:22 PM    Assessment and Plan   Injury of right ankle, initial encounter [S99 911A]  1  Injury of right ankle, initial encounter  XR ankle 3+ vw right    Transfer to other facility      discussed case and x-ray with N[facility] who advised that it be reduced today  [address] [place] [address] ER and discussed that patient was coming  Patient Instructions   Patient Instructions      Proceed to ER for further evaluation  Ankle Fracture   WHAT YOU NEED TO KNOW:   An ankle fracture is a break in 1 or more of the bones in your ankle  DISCHARGE INSTRUCTIONS:   Call your local emergency number (911 in the [address] [place] [address],[address]) for any of the following:   · You feel lightheaded, short of breath, and have chest pain  · You cough up blood  Return to the emergency department if:   · Your leg feels warm, tender, and painful  It may look swollen and red  · Blood soaks through your bandage  · You have severe pain in your ankle  · Your cast feels too tight  · Your foot or toes are cold or numb  · Your foot or toenails turn blue or gray  Call your doctor if:   · Your splint feels too tight  · Your swelling has increased or returned  · You have a fever  · Your pain does not go away, even after treatment  · You have questions or concerns about your condition or care  Medicines: You may need any of the following:  · Acetaminophen  decreases pain and fever  It is available without a doctor's order  Ask how much to take and how often to take it  Follow directions  Read the labels of all other medicines you are using to see if they also contain acetaminophen, or ask your doctor or pharmacist  Acetaminophen can cause liver damage if not taken correctly  Do not use more than 4 grams (4,000 milligrams) total of acetaminophen in one day       · NSAIDs , such as ibuprofen, help decrease swelling, pain, and fever  This medicine is available with or without a doctor's order  NSAIDs can cause stomach bleeding or kidney problems in certain people  If you take blood thinner medicine, always ask your healthcare provider if NSAIDs are safe for you  Always read the medicine label and follow directions  · Prescription pain medicine  may be given  Ask your healthcare provider how to take this medicine safely  Some prescription pain medicines contain acetaminophen  Do not take other medicines that contain acetaminophen without talking to your healthcare provider  Too much acetaminophen may cause liver damage  Prescription pain medicine may cause constipation  Ask your healthcare provider how to prevent or treat constipation  · Take your medicine as directed  Contact your healthcare provider if you think your medicine is not helping or if you have side effects  Tell him or her if you are allergic to any medicine  Keep a list of the medicines, vitamins, and herbs you take  Include the amounts, and when and why you take them  Bring the list or the pill bottles to follow-up visits  Carry your medicine list with you in case of an emergency  Follow up with your doctor in 1 to 2 days: Your fracture may need to be reduced (bones pushed back into place) or you may need surgery  Write down your questions so you remember to ask them during your visits  Support devices: You will be given a brace, cast, or splint to limit your movement and protect your ankle  You may need to use crutches to protect your ankle and decrease your pain as you move around  Do not remove your device and do not put weight on your injured ankle  Splint and cast care:  Cover the splint or cast before you bathe so it does not get wet  Tape 2 plastic trash bags to your skin above the cast  Try to keep your ankle out of the water as much as possible    Rest:  Rest your ankle so that it can heal  Return to normal activities as directed  Ice:  Apply ice on your ankle for 15 to 20 minutes every hour or as directed  Use an ice pack, or put crushed ice in a plastic bag  Cover it with a towel  Ice helps prevent tissue damage and decreases swelling and pain  Elevate:  Elevate your ankle above the level of your heart as often as you can  This will help decrease swelling and pain  Prop your ankle on pillows or blankets to keep it elevated comfortably  © Copyright [address] Information is for End User's use only and may not be sold, redistributed or otherwise used for commercial purposes  All illustrations and images included in CareNotes® are the copyrighted property of A D A M , Inc  or [address] [place], [place] [address]  The above information is an  only  It is not intended as medical advice for individual conditions or treatments  Talk to your doctor, nurse or pharmacist before following any medical regimen to see if it is safe and effective for you  Follow up with PCP in 3-5 days  Proceed to  ER if symptoms worsen  Chief Complaint     Chief Complaint   Patient presents with    Foot Pain     pt states she was walking down stairs when she tripped, missed a step and fell to the ground hurting her right foot 3 days ago         History of Present Illness       Cant put weight on right foot following a fall 2 days ago  She was walking down the steps, tripped and landed with foot underneath her on concrete  No has swelling/bruising, and pain of left foot/ankle primarily over the medial side  Ankle AROM limited due to swelling/pain but can move a little  Denies head injury, knee pain/injury, numnbess/tingling, calf pain,       Review of Systems   Review of Systems   Musculoskeletal: Positive for arthralgias, gait problem and joint swelling  Neurological: Negative for weakness and numbness  Psychiatric/Behavioral: Negative for confusion           Current Medications       Current Outpatient Medications:    carisoprodol (SOMA) 350 mg tablet, Take 350 mg by mouth 4 (four) times a day, Disp: , Rfl:     diazepam (VALIUM) 5 mg tablet, Take 5 mg by mouth daily at bedtime, Disp: , Rfl:     DULoxetine (CYMBALTA) 60 mg delayed release capsule, , Disp: , Rfl:     oxyCODONE-acetaminophen (PERCOCET)  mg per tablet, Take 1 tablet by mouth every 4 to 6 hours if needed for pain, Disp: , Rfl:     verapamil (CALAN) 120 mg tablet, Take 360 mg by mouth daily, Disp: , Rfl:     zolpidem (AMBIEN) 10 mg tablet, Take 10 mg by mouth daily at bedtime, Disp: , Rfl:     Current Allergies     Allergies as of 06/11/2021 - Reviewed 06/11/2021   Allergen Reaction Noted    Morphine  12/22/2019    Prednisone  06/11/2015            The following portions of the patient's history were reviewed and updated as appropriate: allergies, current medications, past family history, past medical history, past social history, past surgical history and problem list      Past Medical History:   Diagnosis Date    Chronic back pain     Hypertension     Migraine     Non Hodgkin's lymphoma ([facility] [address] )        Past Surgical History:   Procedure Laterality Date    HYSTERECTOMY      TONSILLECTOMY         History reviewed  No pertinent family history  Medications have been verified  Objective   /76   Pulse 75   Temp 97 6 °F (36 4 °C)   Resp 18   Ht 5' 7" (1 702 m)   Wt 81 6 kg (180 lb)   LMP  (LMP Unknown)   SpO2 98%   BMI 28 19 kg/m²        Physical Exam     Physical Exam  Constitutional:       Appearance: Normal appearance  Musculoskeletal:         General: Swelling, tenderness, deformity and signs of injury present  Comments: Ecchymosis along the medial side of the right foot/ankle  Tenderness to palpation over medial malleolus and just proximal to the medial malleolus  Moderate amount of swelling around the ankle and lower shin  1+ pitting edema present up to proximal tibia  Neurovascularly intact distally    Active range of motion ankle severely limited due to pain/swelling   Skin:     Capillary Refill: Capillary refill takes less than 2 seconds  Findings: Bruising present  Neurological:      Mental Status: She is alert  Psychiatric:         Mood and Affect: Mood normal          Behavior: Behavior normal            Splint application    Date/Time: 6/11/2021 1:19 PM  Performed by: Blanca Lorenzo PA-C  Authorized by: Blanca Lorenzo PA-C   Universal Protocol:  Procedure performed by: Sergio Dove PA-C)  Consent: Verbal consent obtained  Risks and benefits: risks, benefits and alternatives were discussed  Consent given by: patient  Patient understanding: patient states understanding of the procedure being performed  Patient consent: the patient's understanding of the procedure matches consent given  Patient identity confirmed: verbally with patient      Pre-procedure details:     Sensation:  Normal  Procedure details:     Laterality:  Right    Location:  Ankle    Ankle:  R ankle    Strapping: no      Splint type: stirrup  Supplies:  Cotton padding, elastic bandage and fiberglass  Post-procedure details:     Pain:  Unchanged    Sensation:  Normal    Patient tolerance of procedure:   Tolerated well, no immediate complications

## 2021-06-11 NOTE — PATIENT INSTRUCTIONS
Proceed to ER for further evaluation  Ankle Fracture   WHAT YOU NEED TO KNOW:   An ankle fracture is a break in 1 or more of the bones in your ankle  DISCHARGE INSTRUCTIONS:   Call your local emergency number (911 in the [address]) for any of the following:   · You feel lightheaded, short of breath, and have chest pain  · You cough up blood  Return to the emergency department if:   · Your leg feels warm, tender, and painful  It may look swollen and red  · Blood soaks through your bandage  · You have severe pain in your ankle  · Your cast feels too tight  · Your foot or toes are cold or numb  · Your foot or toenails turn blue or gray  Call your doctor if:   · Your splint feels too tight  · Your swelling has increased or returned  · You have a fever  · Your pain does not go away, even after treatment  · You have questions or concerns about your condition or care  Medicines: You may need any of the following:  · Acetaminophen  decreases pain and fever  It is available without a doctor's order  Ask how much to take and how often to take it  Follow directions  Read the labels of all other medicines you are using to see if they also contain acetaminophen, or ask your doctor or pharmacist  Acetaminophen can cause liver damage if not taken correctly  Do not use more than 4 grams (4,000 milligrams) total of acetaminophen in one day  · NSAIDs , such as ibuprofen, help decrease swelling, pain, and fever  This medicine is available with or without a doctor's order  NSAIDs can cause stomach bleeding or kidney problems in certain people  If you take blood thinner medicine, always ask your healthcare provider if NSAIDs are safe for you  Always read the medicine label and follow directions  · Prescription pain medicine  may be given  Ask your healthcare provider how to take this medicine safely  Some prescription pain medicines contain acetaminophen   Do not take other medicines that contain acetaminophen without talking to your healthcare provider  Too much acetaminophen may cause liver damage  Prescription pain medicine may cause constipation  Ask your healthcare provider how to prevent or treat constipation  · Take your medicine as directed  Contact your healthcare provider if you think your medicine is not helping or if you have side effects  Tell him or her if you are allergic to any medicine  Keep a list of the medicines, vitamins, and herbs you take  Include the amounts, and when and why you take them  Bring the list or the pill bottles to follow-up visits  Carry your medicine list with you in case of an emergency  Follow up with your doctor in 1 to 2 days: Your fracture may need to be reduced (bones pushed back into place) or you may need surgery  Write down your questions so you remember to ask them during your visits  Support devices: You will be given a brace, cast, or splint to limit your movement and protect your ankle  You may need to use crutches to protect your ankle and decrease your pain as you move around  Do not remove your device and do not put weight on your injured ankle  Splint and cast care:  Cover the splint or cast before you bathe so it does not get wet  Tape 2 plastic trash bags to your skin above the cast  Try to keep your ankle out of the water as much as possible  Rest:  Rest your ankle so that it can heal  Return to normal activities as directed  Ice:  Apply ice on your ankle for 15 to 20 minutes every hour or as directed  Use an ice pack, or put crushed ice in a plastic bag  Cover it with a towel  Ice helps prevent tissue damage and decreases swelling and pain  Elevate:  Elevate your ankle above the level of your heart as often as you can  This will help decrease swelling and pain  Prop your ankle on pillows or blankets to keep it elevated comfortably       © Copyright [address] Information is for End User's use only and may not be sold, redistributed or otherwise used for commercial purposes  All illustrations and images included in CareNotes® are the copyrighted property of A D A M , Inc  or Edgardo Agudelo  The above information is an  only  It is not intended as medical advice for individual conditions or treatments  Talk to your doctor, nurse or pharmacist before following any medical regimen to see if it is safe and effective for you

## 2021-06-11 NOTE — ED NOTES
1930 pick -up [place] to [employer]   Dr Zelda Farris   Report Pedro Luis Salinas [address]  06/11/21 0841

## 2021-06-11 NOTE — ED PROVIDER NOTES
History  Chief Complaint   Patient presents with    Ankle Injury     Pt reports coming from immediate care after being seen for a right ankle injury  Pt reports she was told to come to ED do to multiple fractures  Pt is splinted  Patient is a 57-year-old female presents emergency department from urgent care  Patient states that on Wednesday she was walking down the stairs to throw a pizza box  She states that she slipped on the last step rolling her right ankle  She states she had immediate pain  States that she rested it and used a walker to help ambulate  States that she was using her heel to walk on and putting her full weight on her ankle  She states that the swelling became progressively worse  States that when she woke up today and noticed the persistent swelling, along with her inability to still walk on her ankle that she would have evaluated  Upon evaluation at the urgent care, it was found that she had a right bimalleolar ankle fracture dislocation  She was sent emergency department for further evaluation and reduction  Patient denies any numbness, tingling  Patient denies any head injury in the fall  She denies any chest pain, shortness of back pain, nausea, vomiting  Prior to Admission Medications   Prescriptions Last Dose Informant Patient Reported? Taking?    DULoxetine (CYMBALTA) 60 mg delayed release capsule   Yes No   carisoprodol (SOMA) 350 mg tablet   Yes No   Sig: Take 350 mg by mouth 4 (four) times a day   diazepam (VALIUM) 5 mg tablet   Yes No   Sig: Take 5 mg by mouth daily at bedtime   oxyCODONE-acetaminophen (PERCOCET)  mg per tablet   Yes No   Sig: Take 1 tablet by mouth every 4 to 6 hours if needed for pain   verapamil (CALAN) 120 mg tablet   Yes No   Sig: Take 360 mg by mouth daily   zolpidem (AMBIEN) 10 mg tablet   Yes No   Sig: Take 10 mg by mouth daily at bedtime      Facility-Administered Medications: None       Past Medical History:   Diagnosis Date  Chronic back pain     Hypertension     Migraine     Non Hodgkin's lymphoma ([facility] [address] )        Past Surgical History:   Procedure Laterality Date    HYSTERECTOMY      TONSILLECTOMY         History reviewed  No pertinent family history  I have reviewed and agree with the history as documented  E-Cigarette/Vaping    E-Cigarette Use Never User      E-Cigarette/Vaping Substances     Social History     Tobacco Use    Smoking status: Current Every Day Smoker     Packs/day: 0 25    Smokeless tobacco: Never Used   Substance Use Topics    Alcohol use: Not Currently    Drug use: Never       Review of Systems   Constitutional: Negative for fever  Respiratory: Negative for shortness of breath  Cardiovascular: Negative for chest pain  Gastrointestinal: Negative for abdominal pain  Musculoskeletal: Positive for arthralgias, gait problem and joint swelling  Negative for back pain  Neurological: Negative for dizziness  All other systems reviewed and are negative  Physical Exam  Physical Exam  Vitals signs reviewed  Constitutional:       Appearance: Normal appearance  HENT:      Head: Normocephalic and atraumatic  Right Ear: Tympanic membrane, ear canal and external ear normal       Left Ear: Tympanic membrane, ear canal and external ear normal       Nose: Nose normal       Mouth/Throat:      Mouth: Mucous membranes are moist    Eyes:      Extraocular Movements: Extraocular movements intact  Conjunctiva/sclera: Conjunctivae normal       Pupils: Pupils are equal, round, and reactive to light  Cardiovascular:      Rate and Rhythm: Normal rate and regular rhythm  Pulses: Normal pulses  Pulmonary:      Effort: Pulmonary effort is normal       Breath sounds: Normal breath sounds  Abdominal:      General: Bowel sounds are normal       Palpations: Abdomen is soft  Musculoskeletal:      Right ankle: She exhibits decreased range of motion, swelling, ecchymosis and deformity   She exhibits normal pulse  Tenderness  No proximal fibula tenderness found  Skin:     Capillary Refill: Capillary refill takes less than 2 seconds  Neurological:      General: No focal deficit present  Mental Status: She is alert and oriented to person, place, and time  Psychiatric:         Mood and Affect: Mood normal          Behavior: Behavior normal          Thought Content: Thought content normal          Judgment: Judgment normal          Vital Signs  ED Triage Vitals   Temperature Pulse Respirations Blood Pressure SpO2   06/11/21 1351 06/11/21 1351 06/11/21 1351 06/11/21 1351 06/11/21 1351   97 6 °F (36 4 °C) 86 18 (!) 187/86 99 %      Temp Source Heart Rate Source Patient Position - Orthostatic VS BP Location FiO2 (%)   06/11/21 1358 06/11/21 1351 -- 06/11/21 1351 --   Oral Monitor  Right arm       Pain Score       --                  Vitals:    06/11/21 1543 06/11/21 1548 06/11/21 1600 06/11/21 1615   BP: 136/69 157/77 151/73 142/72   Pulse: 85 84 83 83         Visual Acuity      ED Medications  Medications   HYDROmorphone (DILAUDID) injection 1 mg (1 mg Intravenous Given 6/11/21 1428)   ondansetron (ZOFRAN) injection 4 mg (4 mg Intravenous Given 6/11/21 1425)   lidocaine (PF) (XYLOCAINE-MPF) 1 % injection 20 mL (20 mL Infiltration Given by Other 6/11/21 1429)   propofol (DIPRIVAN) 200 MG/20ML bolus injection 100 mg (100 mg Intravenous Given by Other 6/11/21 1523)   sodium chloride 0 9 % bolus 500 mL (0 mL Intravenous Stopped 6/11/21 1546)       Diagnostic Studies  Results Reviewed     None                 XR ankle 2 views RIGHT   Final Result by Rossy Israel MD (06/11 1654)      Evidence of unsuccessful ankle reduction              Workstation performed: [id number]         XR ankle 2 views RIGHT    (Results Pending)              Procedures  Orthopedic injury treatment    Date/Time: 6/11/2021 2:30 PM  Performed by: Ana Rosa uLcero PA-C  Authorized by: Ana Rosa Lucero PA-C     Patient Location:  ED  Verbal consent obtained?: Yes    Risks and benefits: Risks, benefits and alternatives were discussed    Consent given by:  Patient  Patient states understanding of procedure being performed: Yes    Patient identity confirmed:  Verbally with patient  Injury location:  Ankle  Location details:  Right ankle  Injury type:  Fracture-dislocation  Fracture type: bimalleolar    Neurovascular status: Neurovascularly intact    Distal perfusion: normal    Neurological function: normal    Range of motion: reduced    Local anesthesia used?: Yes    Anesthesia:  Hematoma block  Local anesthetic:  Lidocaine 1% without epinephrine  Anesthetic total (ml):  20  Manipulation performed?: Yes    Reduction successful?: No    Confirmation: Reduction confirmed by x-ray    Neurovascular status: Neurovascularly intact    Distal perfusion: normal    Neurological function: normal    Range of motion: improved    Patient tolerance:  Patient tolerated the procedure well with no immediate complications    Orthopedic injury treatment    Date/Time: 6/11/2021 3:30 PM  Performed by: Alivia Jones PA-C  Authorized by: Alivia Jones PA-C     Patient Location:  ED  Other Assisting Provider: Yes (comment)    Written consent obtained?: Yes    Risks and benefits: Risks, benefits and alternatives were discussed    Consent given by:  Patient  Patient states understanding of procedure being performed: Yes    Patient's understanding of procedure matches consent: Yes    Procedure consent matches procedure scheduled: Yes    Radiology Images displayed and confirmed   If images not available, report reviewed: Yes    Patient identity confirmed:  Arm band  Time out: Immediately prior to the procedure a time out was called    Injury location:  Ankle  Location details:  Right ankle  Injury type:  Fracture-dislocation  Fracture type: bimalleolar    Neurovascular status: Neurovascularly intact    Distal perfusion: normal    Neurological function: normal    Range of motion: reduced    Sedation type: Moderate (conscious) sedation (See separate Procedural Sedation form)  Manipulation performed?: Yes    Reduction successful?: No    Confirmation: Reduction confirmed by x-ray    Immobilization:  Splint  Splint type:  Short leg  Supplies used:  Cotton padding and Ortho-Glass  Neurovascular status: Neurovascularly intact    Distal perfusion: normal    Neurological function: normal    Range of motion: unchanged    Patient tolerance:  Patient tolerated the procedure well with no immediate complications             ED Course                                           MDM  Number of Diagnoses or Management Options  Bimalleolar fracture of right ankle:   Diagnosis management comments: Patient is a 57-year-old female presents emergency department from urgent care  Patient states that on Wednesday she was walking down the stairs to throw a pizza box  She states that she slipped on the last step rolling her right ankle  She states she had immediate pain  States that she rested it and used a walker to help ambulate  States that she was using her heel to walk on and putting her full weight on her ankle  She states that the swelling became progressively worse  States that when she woke up today and noticed the persistent swelling, along with her inability to still walk on her ankle that she would have evaluated  Upon evaluation at the urgent care, it was found that she had a right bimalleolar ankle fracture dislocation  She was sent emergency department for further evaluation and reduction  Patient denies any numbness, tingling  Patient denies any head injury in the fall  She denies any chest pain, shortness of back pain, nausea, vomiting  On examination of the right lower extremity, she has 1+ dorsalis pedis pulse  Sensation light touch intact  The toes are warm mobile  There is test palpation diffusely about the right ankle    There is a significant amount of swelling noted  The ankle is obviously deformed  Remainder exam is unremarkable  X-ray images from urgent care reviewed and does show a right ankle by malleolar ankle fracture and dislocation  Patient received IV Dilaudid and hematoma block into the right ankle  Bedside reduction was attempted in the emergency department  Post reduction films demonstrate an unsuccessful reduction  Patient then underwent IV sedation with propofol  Repeat reduction was attempted  Post reduction films again demonstrate minimal improvement in the dislocation  Patient will be transferred to [facility] Emergency Department for orthopaedic evaluation         Amount and/or Complexity of Data Reviewed  Tests in the radiology section of CPT®: ordered and reviewed  Discuss the patient with other providers: yes  Independent visualization of images, tracings, or specimens: yes    Risk of Complications, Morbidity, and/or Mortality  Presenting problems: moderate  Diagnostic procedures: moderate  Management options: moderate    Patient Progress  Patient progress: stable      Disposition  Final diagnoses:   Bimalleolar fracture of right ankle     Time reflects when diagnosis was documented in both MDM as applicable and the Disposition within this note     Time User Action Codes Description Comment    6/11/2021  3:10 PM Shanel Kennedy [[id number]] Bimalleolar fracture of right ankle       ED Disposition     None      Follow-up Information     Follow up With Specialties Details Why Contact Info Additional [address] [facility] Orthopedic Surgery Schedule an appointment as soon as possible for a visit   [address] [facility] Gio Higgins [address], [address], [place], [place], [address] [phone]          Patient's Medications   Discharge Prescriptions    No medications on file     No discharge procedures on file      PDMP Review     None          ED Provider  Electronically Signed by           Emanuel Lugo PA-C  06/11/21 2261

## 2021-06-11 NOTE — TELEPHONE ENCOUNTER
Call to this patient,no answer left a message to contact our office to schedule and appointment with Dr Fernando Akers due to the traumatic nature of her fracture, We are holding time of Tues 06/15/2021 @ 2pm open for her if that does not work put thru to [facility] to be scheduled

## 2021-06-11 NOTE — EMTALA/ACUTE CARE TRANSFER
[address] [address]  Dept: [phone]      EMTALA TRANSFER CONSENT    NAME Opal XIE 1965                              MRN [id number]    I have been informed of my rights regarding examination, treatment, and transfer   by Dr Nette Perry MD    Benefits: Specialized equipment and/or services available at the receiving facility (Include comment)________________________    Risks: Potential for delay in receiving treatment, Potential deterioration of medical condition, Loss of IV      Consent for Transfer:  I acknowledge that my medical condition has been evaluated and explained to me by the emergency department physician or other qualified medical person and/or my attending physician, who has recommended that I be transferred to the service of  Accepting Physician: Dr Rocio Art at    The above potential benefits of such transfer, the potential risks associated with such transfer, and the probable risks of not being transferred have been explained to me, and I fully understand them  The doctor has explained that, in my case, the benefits of transfer outweigh the risks  I agree to be transferred  I authorize the performance of emergency medical procedures and treatments upon me in both transit and upon arrival at the receiving facility  Additionally, I authorize the release of any and all medical records to the receiving facility and request they be transported with me, if possible  I understand that the safest mode of transportation during a medical emergency is an ambulance and that the Hospital advocates the use of this mode of transport  Risks of traveling to the receiving facility by car, including absence of medical control, life sustaining equipment, such as oxygen, and medical personnel has been explained to me and I fully understand them      ([address] [place])  [  ] I consent to the stated transfer and to be transported by ambulance/helicopter  [  ]  I consent to the stated transfer, but refuse transportation by ambulance and accept full responsibility for my transportation by car  I understand the risks of non-ambulance transfers and I exonerate the Hospital and its staff from any deterioration in my condition that results from this refusal     X___________________________________________    DATE  21  TIME________  Signature of patient or legally responsible individual signing on patient behalf           RELATIONSHIP TO PATIENT_________________________          Provider Certification    NAME [address] [place] [address] 1965                              MRN [id number]    A medical screening exam was performed on the above named patient  Based on the examination:    Condition Necessitating Transfer The encounter diagnosis was Bimalleolar fracture of right ankle  Patient Condition: The patient has been stabilized such that within reasonable medical probability, no material deterioration of the patient condition or the condition of the unborn child(lian) is likely to result from the transfer    Reason for Transfer: Level of Care needed not available at this facility    Transfer Requirements: Facility     · Space available and qualified personnel available for treatment as acknowledged by    · Agreed to accept transfer and to provide appropriate medical treatment as acknowledged by       Dr Joseph Grace  · Appropriate medical records of the examination and treatment of the patient are provided at the time of transfer   [address], [address] _______  · Transfer will be performed by qualified personnel from    and appropriate transfer equipment as required, including the use of necessary and appropriate life support measures      Provider Certification: I have examined the patient and explained the following risks and benefits of being transferred/refusing transfer to the patient/family:  General risk, such as traffic hazards, adverse weather conditions, rough terrain or turbulence, possible failure of equipment (including vehicle or aircraft), or consequences of actions of persons outside the control of the transport personnel      Based on these reasonable risks and benefits to the patient and/or the unborn child(lian), and based upon the information available at the time of the patients examination, I certify that the medical benefits reasonably to be expected from the provision of appropriate medical treatments at another medical facility outweigh the increasing risks, if any, to the individuals medical condition, and in the case of labor to the unborn child, from effecting the transfer      X____________________________________________ DATE 06/11/21        TIME_______      ORIGINAL - SEND TO MEDICAL RECORDS   COPY - SEND WITH PATIENT DURING TRANSFER

## 2021-06-11 NOTE — TELEPHONE ENCOUNTER
[address] Now called to make an appointment for patient's right ankle fx  Nothing available within 3-5 days  Email sent to [facility] for force on appointment

## 2021-06-12 NOTE — DISCHARGE INSTRUCTIONS
Discharge Instructions - [facility] [place] 60 y o  female MRN: [id number]  Unit/Bed#: X ray    Weight Bearing Status:                                           Nonweightbearing on the right lower extremity  Do not put any weight on your right leg  Use crutches or walker for ambulation assistance    Pain:  You can take Tylenol and Advil around the clock, we will send medication to your pharmacy in the morning to take for severe pain    Dressing Instructions:   Please keep splint clean, dry and intact until follow up     Appt Instructions: If you do not have your appointment, please call the clinic at [phone] to to follow-up with Orthopedics next week to discuss surgery  Otherwise followup as scheduled     Contact the office sooner if you experience any increased numbness/tingling in the extremities  Care of Casts and Splints    Casts and splints support and protect injured bones and soft tissue  When you break a bone, your doctor will put the pieces back together in the right position  Casts and splints hold the bones in place while they heal  They also reduce pain, swelling, and muscle spasm  In some cases, splints and casts are applied following surgery  Splints or "half-casts" provide less support than casts  However, splints can be adjusted to accommodate swelling from injuries easier than enclosed casts  Your doctor will decide which type of support is best for you  Types of Splints and Casts  Casts are custom-made  They must fit the shape of your injured limb correctly to provide the best support  Casts can be made of plaster or fiberglass -- a plastic that can be shaped  Splints or half-casts can also be custom-made, especially if an exact fit is necessary  Other times, a ready-made splint will be used  These off-the-shelf splints are made in a variety of shapes and sizes, and are much easier and faster to use   They have Velcro straps which make the splints easy to put on, take off, and adjust     Materials  Fiberglass or plaster materials form the hard supportive layer in splints and casts  Fiberglass is lighter in weight and stronger than plaster  In addition, x-rays can "see through" fiberglass better than through plaster  This is important because your doctor will probably schedule additional x-rays after your splint or cast has been applied  X-rays can show whether the bones are healing well or have moved out of place  Plaster is less expensive than fiberglass and shapes better than fiberglass for some uses  Application  Both fiberglass and plaster splints and casts use padding, usually cotton, as a protective layer next to the skin  Both materials come in strips or rolls which are dipped in water and applied over the padding covering the injured area  The splint or cast must fit the shape of the injured arm or leg correctly to provide the best possible support  Generally, the splint or cast also covers the joint above and below the broken bone  In many cases, a splint is applied to a fresh injury first  As swelling subsides, a full cast may replace the splint  Sometimes, it may be necessary to replace a cast as swelling goes down and the cast gets "too big " As a fracture heals, the cast may be replaced by a splint to make it easier to perform physical therapy exercises  Apply ice to the splint or cast and elevate your leg to reduce swelling  Warning Signs  Swelling can create a lot of pressure under your cast  This can lead to problems  If you experience any of the following symptoms, contact your doctor's office immediately for advice  Increased pain and the feeling that the splint or cast is too tight  This may be caused by swelling  Numbness and tingling in your hand or foot  This may be caused by too much pressure on the nerves  Burning and stinging  This may be caused by too much pressure on the skin     Excessive swelling below the cast  This may mean the cast is slowing your blood circulation  Loss of active movement of toes or fingers  This requires an urgent evaluation by your doctor  Getting Used to a Splint or Cast  Swelling due to your injury may cause pressure in your splint or cast for the first 48 to 72 hours  This may cause your injured arm or leg to feel snug or tight in the splint or cast  If you have a splint, your doctor will show you how to adjust it to accommodate the swelling  It is very important to keep the swelling down  This will lessen pain and help your injury heal  To help reduce swelling:  Elevate  It is very important to elevate your injured arm or leg for the first 24 to 72 hours  Prop your injured arm or leg up above your heart by putting it on pillows or some other support  You will have to recline if the splint or cast is on your leg  Elevation allows clear fluid and blood to drain "downhill" to your heart  Exercise  Move your uninjured, but swollen fingers or toes gently and often  Moving them often will prevent stiffness  Ice  Apply ice to the splint or cast  Place the ice in a dry plastic bag or ice pack and loosely wrap it around the splint or cast at the level of the injury  Ice that is packed in a rigid container and touches the cast at only one point will not be effective  Taking Care of Your Splint or Cast  Your doctor will explain any restrictions on using your injured arm or leg while it is healing  You must follow your doctor's instructions carefully to make sure your bone heals properly  The following information provides general guidelines only, and is not a substitute for your doctor's advice  After you have adjusted to your splint or cast for a few days, it is important to keep it in good condition  This will help your recovery  Keep your splint or cast dry  Moisture weakens plaster and damp padding next to the skin can cause irritation   Use two layers of plastic or purchase waterproof shields to keep your splint or cast dry while you shower or bathe  Even if the cast is covered, do not submerge it or hold it under running water  A small pinhole in the cast cover can cause the injury to get soaked  Walking casts  Do not walk on a "walking cast" until it is completely dry and hard  It takes about one hour for fiberglass, and two to three days for plaster to become hard enough to walk on  Avoid dirt  Keep dirt, sand, and powder away from the inside of your splint or cast    Padding  Do not pull out the padding from your splint or cast    Itching  Do not stick objects such as coat hangers inside the splint or cast to scratch itching skin  Do not apply powders or deodorants to itching skin  If itching persists, contact your doctor  Trimming  Do not break off rough edges of the cast or trim the cast before asking your doctor  Skin  Inspect the skin around the cast  If your skin becomes red or raw around the cast, contact your doctor  Inspect the cast regularly  If it becomes cracked or develops soft spots, contact your doctor's office  Use common sense  You have a serious injury and you must protect your cast from damage so it can protect your injury while it heals  After the initial swelling has subsided, proper splint or cast support will usually allow you to continue your daily activities with a minimum of inconvenience  Cast Removal  Never remove the cast yourself  You may cut your skin or prevent proper healing of your injury

## 2021-06-12 NOTE — CONSULTS
[address] 60 y o  female MRN: [id number]  Unit/Bed#: X ray      Chief Complaint:   right ankle pain    HPI:  60 y  o female complaining of right ankle pain and inability to bear weight  Pain is worse with palpation and motion; impproves at rest  She states 2 days ago she had a fall twisting her ankle  Pain and swelling didn't so she went to an urgent care  She has then sent to [facility] ED where she reports that 3 attempts were made to reduce her right ankle frcature  She is not diabetic  She does smoke 1-2 packs a day  Review Of Systems:   · Skin: swelling  · Neuro: See HPI  · Musculoskeletal: See HPI  · 14 point review of systems negative except as stated above     Past Medical History:   Past Medical History:   Diagnosis Date    Chronic back pain     Hypertension     Migraine     Non Hodgkin's lymphoma ([facility] [address] )        Past Surgical History:   Past Surgical History:   Procedure Laterality Date    HYSTERECTOMY      TONSILLECTOMY         Family History:  Family history reviewed and non-contributory  History reviewed  No pertinent family history      Social History:  Social History     Socioeconomic History    Marital status: /Civil Union     Spouse name: None    Number of children: None    Years of education: None    Highest education level: None   Occupational History    None   Social Needs    Financial resource strain: None    Food insecurity     Worry: None     Inability: None    Transportation needs     Medical: None     Non-medical: None   Tobacco Use    Smoking status: Current Every Day Smoker     Packs/day: 0 25    Smokeless tobacco: Never Used   Substance and Sexual Activity    Alcohol use: Not Currently    Drug use: Never    Sexual activity: None   Lifestyle    Physical activity     Days per week: None     Minutes per session: None    Stress: None   Relationships    Social connections     Talks on phone: None     Gets together: None     Attends Gnosticist service: None     Active member of club or organization: None     Attends meetings of clubs or organizations: None     Relationship status: None    Intimate partner violence     Fear of current or ex partner: None     Emotionally abused: None     Physically abused: None     Forced sexual activity: None   Other Topics Concern    None   Social History Narrative    None       Allergies: Allergies   Allergen Reactions    Morphine Hives    Prednisone      After 2 days, she had swollen tongue,            Labs:  0   Lab Value Date/Time    HCT 38 0 12/22/2019 1751    HGB 12 2 12/22/2019 1751    WBC 7 40 12/22/2019 1751       Meds:    Current Facility-Administered Medications:     acetaminophen (TYLENOL) tablet 975 mg, 975 mg, Oral, Once, Shalom Gallagher MD    oxyCODONE (ROXICODONE) IR tablet 5 mg, 5 mg, Oral, Once, Honorio Mckeon MD    Current Outpatient Medications:     carisoprodol (SOMA) 350 mg tablet, Take 350 mg by mouth 4 (four) times a day, Disp: , Rfl:     diazepam (VALIUM) 5 mg tablet, Take 5 mg by mouth daily at bedtime, Disp: , Rfl:     DULoxetine (CYMBALTA) 60 mg delayed release capsule, , Disp: , Rfl:     oxyCODONE-acetaminophen (PERCOCET)  mg per tablet, Take 1 tablet by mouth every 4 to 6 hours if needed for pain, Disp: , Rfl:     verapamil (CALAN) 120 mg tablet, Take 360 mg by mouth daily, Disp: , Rfl:     zolpidem (AMBIEN) 10 mg tablet, Take 10 mg by mouth daily at bedtime, Disp: , Rfl:     Blood Culture:   No results found for: BLOODCX    Wound Culture:   No results found for: WOUNDCULT    Ins and Outs:  No intake/output data recorded  Physical Exam:   /91 (BP Location: Right arm)   Pulse 91   Temp 97 6 °F (36 4 °C) (Oral)   Resp 18   LMP  (LMP Unknown)   SpO2 97%   Gen: Alert and oriented to person, place, time  HEENT: EOMI, eyes clear, moist mucus membranes, hearing intact  Respiratory: Bilateral chest rise   No audible wheezing found  Cardiovascular: Regular Rate and Rhythm  Abdomen: soft nontender/nondistended  Musculoskeletal: right lower extremity  · Skin with no open lesions, moderate swelling throughout the ankle  · Tender to palpation over medial and lateral malleoli  · Painful ankle range of motion  · Sensation intact DP/SP/Tib/Pelon/Saph  · Positive knee flexion/extension, EHL/FHL  · Palpable DP and PT pulses    Radiology:   I personally reviewed the films  X-rays right ankle shows displaced bimalleolar ankle fracture    Procedure- [address] 60 y o  female MRN: [id number]  Unit/Bed#: X ray    Procedure: Ankle reduction and splint application    A hematoma block was given with 30cc on 0 25% marcaine without epi  Once adequate anesthesia was obtained a gentle closed reduction maneuver was performed and pt was placed in a well padded AO splint  Pt tolerated the procedure well and was neurovascularly intact both pre and post procedure  Post reduction orthogonal x rays showed appropriate reduction of the talus in the ankle mortise     _*_*_*_*_*_*_*_*_*_*_*_*_*_*_*_*_*_*_*_*_*_*_*_*_*_*_*_*_*_*_*_*_*_*_*_*_*_*_*_*_*    Assessment:  60 y  o female status post twisting injury 2 days ago with right bimalleolar ankle fracture  Patient will need surgery for this injury in a non-urgent fashion in order to wait for swelling to diminish  She has transportation available to go home       Plan:   · NWB right lower extremity in AO splint  · Ok for DC from ED with instructions to f/u with Orthopaedics early next week to discuss surgery (information provided in AVS)  · Patient given pain medication prior to discharge and pain medication will be sent to her pharmacy  · Instructed to return to ED if pt experiences new numbness or tingling, pain that is uncontrollable with oral pain meds, or if toes become cold and pale    Perri Cid MD

## 2021-06-15 ENCOUNTER — OFFICE VISIT (OUTPATIENT)
Dept: OBGYN CLINIC | Facility: CLINIC | Age: 56
End: 2021-06-15
Payer: MEDICARE

## 2021-06-15 VITALS
WEIGHT: 179 LBS | HEART RATE: 94 BPM | DIASTOLIC BLOOD PRESSURE: 78 MMHG | HEIGHT: 67 IN | BODY MASS INDEX: 28.09 KG/M2 | SYSTOLIC BLOOD PRESSURE: 139 MMHG

## 2021-06-15 DIAGNOSIS — S82.841A CLOSED BIMALLEOLAR FRACTURE OF RIGHT ANKLE, INITIAL ENCOUNTER: Primary | ICD-10-CM

## 2021-06-15 PROCEDURE — 99204 OFFICE O/P NEW MOD 45 MIN: CPT | Performed by: ORTHOPAEDIC SURGERY

## 2021-06-15 RX ORDER — CHLORHEXIDINE GLUCONATE 0.12 MG/ML
15 RINSE ORAL ONCE
Status: CANCELLED | OUTPATIENT
Start: 2021-06-15 | End: 2021-06-15

## 2021-06-15 NOTE — H&P (VIEW-ONLY)
Orthopaedics Office Visit -  New Patient Visit    ASSESSMENT/PLAN:    Assessment:   Right bimalleolar ankle fracture, DOI 6/9/2021, indicated for operative fixation  1 5 pack a day smoker     Plan:   Smoking cessation was discussed as there is a high chance of wound healing complications and delayed bone healing    The importance of NWB after surgery was discussed   Script for a wheelchair was provided   Discussed the importance of elevation at or above heart level  Skin check was performed today, she will likely be NWB to RLE for 6-10 weeks pending need for syndesmotic fixation  Right ankle bimalleolar ORIF was discussed at length including risks and benefits  Risks of surgery consist of but not limited to bleeding, infection, stiffness, injury to surrounding structures, non-union, mal-union, wound healing complications, etc    Start 81 MG 2x a day for DVT prophylaxis    Follow up in the office 14 days after surgery for suture removal and repeat right ankle x-rays          To Do Next Visit:  Suture removal, right ankle x-rays     _____________________________________________________  CHIEF COMPLAINT:  Chief Complaint   Patient presents with    Right Ankle - Pain         SUBJECTIVE:  Hu Hall is a 60 y o  female who presents to the office today for a right ankle fracture  Khadijah twisted her right ankle on 6/09/2021 due to a fall down the steps  She presented to the ED on 6/11/2021, at that time x-rays were performed, a ankle reduction was performed and Khadijah was placed into a splint  She has been NWB to RLE with the use of a walker  Khadijah states that she does rest the foot on the ground at times but does not put weight on it  She notes pain to her right ankle  Khadijah is taking Percocet for pain control  She denies associated numbness/tingling  Khadijah is a smoker and smoks aprox  1 5 packs of cigarettes a day        PAST MEDICAL HISTORY:  Past Medical History:   Diagnosis Date    Chronic back pain     Hypertension     Migraine     Non Hodgkin's lymphoma ([facility] [address] )        PAST SURGICAL HISTORY:  Past Surgical History:   Procedure Laterality Date    HYSTERECTOMY      TONSILLECTOMY         FAMILY HISTORY:  History reviewed  No pertinent family history  SOCIAL HISTORY:  Social History     Tobacco Use    Smoking status: Current Every Day Smoker     Packs/day: 0 25    Smokeless tobacco: Never Used   Vaping Use    Vaping Use: Never used   Substance Use Topics    Alcohol use: Not Currently    Drug use: Never       MEDICATIONS:    Current Outpatient Medications:     carisoprodol (SOMA) 350 mg tablet, Take 350 mg by mouth 4 (four) times a day, Disp: , Rfl:     diazepam (VALIUM) 5 mg tablet, Take 5 mg by mouth daily at bedtime, Disp: , Rfl:     DULoxetine (CYMBALTA) 60 mg delayed release capsule, , Disp: , Rfl:     verapamil (CALAN) 120 mg tablet, Take 360 mg by mouth daily, Disp: , Rfl:     zolpidem (AMBIEN) 10 mg tablet, Take 10 mg by mouth daily at bedtime, Disp: , Rfl:     ALLERGIES:  Allergies   Allergen Reactions    Morphine Hives    Prednisone      After 2 days, she had swollen tongue,        REVIEW OF SYSTEMS:  MSK: right ankle pain   Neuro: none   Pertinent items are otherwise noted in HPI  A comprehensive review of systems was otherwise negative      LABS:  HgA1c: No results found for: HGBA1C  BMP:   Lab Results   Component Value Date    CALCIUM 9 1 12/22/2019    K 3 4 (L) 12/22/2019    CO2 25 12/22/2019     12/22/2019    BUN 14 12/22/2019    CREATININE 0 78 12/22/2019     CBC: No components found for: CBC    _____________________________________________________  PHYSICAL EXAMINATION:  Vital signs: /78   Pulse 94   Ht 5' 7" (1 702 m)   Wt 81 2 kg (179 lb)   LMP  (LMP Unknown)   BMI 28 04 kg/m²   General: No acute distress, awake and alert  Psychiatric: Mood and affect appear appropriate  HEENT: Trachea Midline, No torticollis, no apparent facial trauma  Cardiovascular: No audible murmurs; Extremities appear perfused  Pulmonary: No audible wheezing or stridor  Skin: No open lesions; see further details (if any) below    MUSCULOSKELETAL EXAMINATION:    Extremities:  Right ankle:   No erythema   Resolving ecchymosis   No skin break down or wounds    Mild edema  Sensation intact sp/dp/t/s/s distribution    _____________________________________________________  STUDIES REVIEWED:  I personally reviewed the images and interpretation is as follows: x-rays of the right ankle on 6/11/2021 pre reduction and post reduction demonstrate bimalleolar ankle fracture with minimal improved in alignment s/p reduction         PROCEDURES PERFORMED:  Procedures        Scribe Attestation    I,:  Bob Hutchinson am acting as a scribe while in the presence of the attending physician :       I,:  Antonio Esparza MD personally performed the services described in this documentation    as scribed in my presence :

## 2021-06-15 NOTE — PROGRESS NOTES
Orthopaedics Office Visit -  New Patient Visit    ASSESSMENT/PLAN:    Assessment:   Right bimalleolar ankle fracture, DOI 6/9/2021, indicated for operative fixation  1 5 pack a day smoker     Plan:   Smoking cessation was discussed as there is a high chance of wound healing complications and delayed bone healing    The importance of NWB after surgery was discussed   Script for a wheelchair was provided   Discussed the importance of elevation at or above heart level  Skin check was performed today, she will likely be NWB to RLE for 6-10 weeks pending need for syndesmotic fixation  Right ankle bimalleolar ORIF was discussed at length including risks and benefits  Risks of surgery consist of but not limited to bleeding, infection, stiffness, injury to surrounding structures, non-union, mal-union, wound healing complications, etc    Start 81 MG 2x a day for DVT prophylaxis    Follow up in the office 14 days after surgery for suture removal and repeat right ankle x-rays          To Do Next Visit:  Suture removal, right ankle x-rays     _____________________________________________________  CHIEF COMPLAINT:  Chief Complaint   Patient presents with    Right Ankle - Pain         SUBJECTIVE:  Maya Alvarado is a 60 y o  female who presents to the office today for a right ankle fracture  Khadijah twisted her right ankle on 6/09/2021 due to a fall down the steps  She presented to the ED on 6/11/2021, at that time x-rays were performed, a ankle reduction was performed and Khadijah was placed into a splint  She has been NWB to RLE with the use of a walker  Khadijah states that she does rest the foot on the ground at times but does not put weight on it  She notes pain to her right ankle  Khadijah is taking Percocet for pain control  She denies associated numbness/tingling  Khadijah is a smoker and smoks aprox  1 5 packs of cigarettes a day        PAST MEDICAL HISTORY:  Past Medical History:   Diagnosis Date    Chronic back pain     Hypertension     Migraine     Non Hodgkin's lymphoma ([facility] [address] )        PAST SURGICAL HISTORY:  Past Surgical History:   Procedure Laterality Date    HYSTERECTOMY      TONSILLECTOMY         FAMILY HISTORY:  History reviewed  No pertinent family history  SOCIAL HISTORY:  Social History     Tobacco Use    Smoking status: Current Every Day Smoker     Packs/day: 0 25    Smokeless tobacco: Never Used   Vaping Use    Vaping Use: Never used   Substance Use Topics    Alcohol use: Not Currently    Drug use: Never       MEDICATIONS:    Current Outpatient Medications:     carisoprodol (SOMA) 350 mg tablet, Take 350 mg by mouth 4 (four) times a day, Disp: , Rfl:     diazepam (VALIUM) 5 mg tablet, Take 5 mg by mouth daily at bedtime, Disp: , Rfl:     DULoxetine (CYMBALTA) 60 mg delayed release capsule, , Disp: , Rfl:     verapamil (CALAN) 120 mg tablet, Take 360 mg by mouth daily, Disp: , Rfl:     zolpidem (AMBIEN) 10 mg tablet, Take 10 mg by mouth daily at bedtime, Disp: , Rfl:     ALLERGIES:  Allergies   Allergen Reactions    Morphine Hives    Prednisone      After 2 days, she had swollen tongue,        REVIEW OF SYSTEMS:  MSK: right ankle pain   Neuro: none   Pertinent items are otherwise noted in HPI  A comprehensive review of systems was otherwise negative      LABS:  HgA1c: No results found for: HGBA1C  BMP:   Lab Results   Component Value Date    CALCIUM 9 1 12/22/2019    K 3 4 (L) 12/22/2019    CO2 25 12/22/2019     12/22/2019    BUN 14 12/22/2019    CREATININE 0 78 12/22/2019     CBC: No components found for: CBC    _____________________________________________________  PHYSICAL EXAMINATION:  Vital signs: /78   Pulse 94   Ht 5' 7" (1 702 m)   Wt 81 2 kg (179 lb)   LMP  (LMP Unknown)   BMI 28 04 kg/m²   General: No acute distress, awake and alert  Psychiatric: Mood and affect appear appropriate  HEENT: Trachea Midline, No torticollis, no apparent facial trauma  Cardiovascular: No audible murmurs; Extremities appear perfused  Pulmonary: No audible wheezing or stridor  Skin: No open lesions; see further details (if any) below    MUSCULOSKELETAL EXAMINATION:    Extremities:  Right ankle:   No erythema   Resolving ecchymosis   No skin break down or wounds    Mild edema  Sensation intact sp/dp/t/s/s distribution    _____________________________________________________  STUDIES REVIEWED:  I personally reviewed the images and interpretation is as follows: x-rays of the right ankle on 6/11/2021 pre reduction and post reduction demonstrate bimalleolar ankle fracture with minimal improved in alignment s/p reduction         PROCEDURES PERFORMED:  Procedures        Scribe Attestation    I,:  Dmitri Jacques am acting as a scribe while in the presence of the attending physician :       I,:  Lili Garcia MD personally performed the services described in this documentation    as scribed in my presence :

## 2021-06-20 ENCOUNTER — ANESTHESIA EVENT (OUTPATIENT)
Dept: PERIOP | Facility: HOSPITAL | Age: 56
DRG: 492 | End: 2021-06-20
Payer: MEDICARE

## 2021-06-21 ENCOUNTER — APPOINTMENT (OUTPATIENT)
Dept: RADIOLOGY | Facility: HOSPITAL | Age: 56
DRG: 492 | End: 2021-06-21
Payer: MEDICARE

## 2021-06-21 ENCOUNTER — TELEPHONE (OUTPATIENT)
Dept: OBGYN CLINIC | Facility: MEDICAL CENTER | Age: 56
End: 2021-06-21

## 2021-06-21 ENCOUNTER — ANESTHESIA (OUTPATIENT)
Dept: PERIOP | Facility: HOSPITAL | Age: 56
DRG: 492 | End: 2021-06-21
Payer: MEDICARE

## 2021-06-21 ENCOUNTER — HOSPITAL ENCOUNTER (INPATIENT)
Facility: HOSPITAL | Age: 56
LOS: 7 days | Discharge: HOME | DRG: 492 | End: 2021-06-28
Attending: ORTHOPAEDIC SURGERY | Admitting: ORTHOPAEDIC SURGERY
Payer: MEDICARE

## 2021-06-21 DIAGNOSIS — G25.3 MYOCLONUS: ICD-10-CM

## 2021-06-21 DIAGNOSIS — R41.82 ALTERED MENTAL STATUS, UNSPECIFIED ALTERED MENTAL STATUS TYPE: ICD-10-CM

## 2021-06-21 DIAGNOSIS — G93.40 ACUTE ENCEPHALOPATHY: ICD-10-CM

## 2021-06-21 DIAGNOSIS — S82.841D CLOSED BIMALLEOLAR FRACTURE OF RIGHT ANKLE WITH ROUTINE HEALING, SUBSEQUENT ENCOUNTER: Primary | ICD-10-CM

## 2021-06-21 PROBLEM — IMO0001 SMOKING: Status: ACTIVE | Noted: 2021-06-21

## 2021-06-21 PROBLEM — F17.200 SMOKING: Status: ACTIVE | Noted: 2021-06-21

## 2021-06-21 PROBLEM — I10 HTN (HYPERTENSION): Status: ACTIVE | Noted: 2021-06-21

## 2021-06-21 LAB
ANION GAP SERPL CALCULATED.3IONS-SCNC: 11 MMOL/L (ref 4–13)
BUN SERPL-MCNC: 16 MG/DL (ref 5–25)
CALCIUM SERPL-MCNC: 9.6 MG/DL (ref 8.3–10.1)
CHLORIDE SERPL-SCNC: 102 MMOL/L (ref 100–108)
CO2 SERPL-SCNC: 25 MMOL/L (ref 21–32)
CREAT SERPL-MCNC: 0.72 MG/DL (ref 0.6–1.3)
ERYTHROCYTE [DISTWIDTH] IN BLOOD BY AUTOMATED COUNT: 12.9 % (ref 11.6–15.1)
GFR SERPL CREATININE-BSD FRML MDRD: 93 ML/MIN/1.73SQ M
GLUCOSE P FAST SERPL-MCNC: 100 MG/DL (ref 65–99)
GLUCOSE SERPL-MCNC: 100 MG/DL (ref 65–140)
HCT VFR BLD AUTO: 38 % (ref 34.8–46.1)
HGB BLD-MCNC: 12.6 G/DL (ref 11.5–15.4)
MCH RBC QN AUTO: 32.6 PG (ref 26.8–34.3)
MCHC RBC AUTO-ENTMCNC: 33.2 G/DL (ref 31.4–37.4)
MCV RBC AUTO: 98 FL (ref 82–98)
PLATELET # BLD AUTO: 383 THOUSANDS/UL (ref 149–390)
PMV BLD AUTO: 8.8 FL (ref 8.9–12.7)
POTASSIUM SERPL-SCNC: 3.7 MMOL/L (ref 3.5–5.3)
RBC # BLD AUTO: 3.86 MILLION/UL (ref 3.81–5.12)
SODIUM SERPL-SCNC: 138 MMOL/L (ref 136–145)
WBC # BLD AUTO: 10.87 THOUSAND/UL (ref 4.31–10.16)

## 2021-06-21 PROCEDURE — C1769 GUIDE WIRE: HCPCS | Performed by: ORTHOPAEDIC SURGERY

## 2021-06-21 PROCEDURE — 77071 MNL APPL STRS JT RADIOGRAPHY: CPT | Performed by: ORTHOPAEDIC SURGERY

## 2021-06-21 PROCEDURE — 0QSG04Z REPOSITION RIGHT TIBIA WITH INTERNAL FIXATION DEVICE, OPEN APPROACH: ICD-10-PCS | Performed by: ORTHOPAEDIC SURGERY

## 2021-06-21 PROCEDURE — 0QSJ04Z REPOSITION RIGHT FIBULA WITH INTERNAL FIXATION DEVICE, OPEN APPROACH: ICD-10-PCS | Performed by: ORTHOPAEDIC SURGERY

## 2021-06-21 PROCEDURE — C1713 ANCHOR/SCREW BN/BN,TIS/BN: HCPCS | Performed by: ORTHOPAEDIC SURGERY

## 2021-06-21 PROCEDURE — 85027 COMPLETE CBC AUTOMATED: CPT | Performed by: ANESTHESIOLOGY

## 2021-06-21 PROCEDURE — 73610 X-RAY EXAM OF ANKLE: CPT | Performed by: ORTHOPAEDIC SURGERY

## 2021-06-21 PROCEDURE — 93005 ELECTROCARDIOGRAM TRACING: CPT

## 2021-06-21 PROCEDURE — G9197 ORDER FOR CEPH: HCPCS | Performed by: ORTHOPAEDIC SURGERY

## 2021-06-21 PROCEDURE — 27814 TREATMENT OF ANKLE FRACTURE: CPT | Performed by: PHYSICIAN ASSISTANT

## 2021-06-21 PROCEDURE — 73610 X-RAY EXAM OF ANKLE: CPT

## 2021-06-21 PROCEDURE — 80048 BASIC METABOLIC PNL TOTAL CA: CPT | Performed by: ANESTHESIOLOGY

## 2021-06-21 PROCEDURE — 27814 TREATMENT OF ANKLE FRACTURE: CPT | Performed by: ORTHOPAEDIC SURGERY

## 2021-06-21 DEVICE — 4.0MM CANNULATED SCREW-LONG THREAD 58MM: Type: IMPLANTABLE DEVICE | Site: ANKLE | Status: FUNCTIONAL

## 2021-06-21 DEVICE — 2.7MM/3.5MM LCP LATERAL DISTAL FIBULA PLATE 6H/RIGHT/112MM
Type: IMPLANTABLE DEVICE | Site: ANKLE | Status: FUNCTIONAL
Brand: LCP

## 2021-06-21 DEVICE — 3.5MM CORTEX SCREW SELF-TAPPING 14MM: Type: IMPLANTABLE DEVICE | Site: ANKLE | Status: FUNCTIONAL

## 2021-06-21 DEVICE — 4.0MM CANNULATED SCREW LONG THREAD/38MM: Type: IMPLANTABLE DEVICE | Site: ANKLE | Status: FUNCTIONAL

## 2021-06-21 DEVICE — 2.7MM LOCKING SCREW SLF-TPNG WITH T8 STARDRIVE RECESS 14MM: Type: IMPLANTABLE DEVICE | Site: ANKLE | Status: FUNCTIONAL

## 2021-06-21 DEVICE — 3.5MM CORTEX SCREW SELF-TAPPING 50MM: Type: IMPLANTABLE DEVICE | Site: ANKLE | Status: FUNCTIONAL

## 2021-06-21 DEVICE — 2.7MM LOCKING SCREW SLF-TPNG WITH T8 STARDRIVE RECESS 12MM: Type: IMPLANTABLE DEVICE | Site: ANKLE | Status: FUNCTIONAL

## 2021-06-21 DEVICE — 2.7MM LOCKING SCREW SLF-TPNG WITH T8 STARDRIVE RECESS 16MM: Type: IMPLANTABLE DEVICE | Site: ANKLE | Status: FUNCTIONAL

## 2021-06-21 RX ORDER — OXYCODONE HYDROCHLORIDE 5 MG/1
5 TABLET ORAL EVERY 4 HOURS PRN
Status: DISCONTINUED | OUTPATIENT
Start: 2021-06-21 | End: 2021-06-28 | Stop reason: HOSPADM

## 2021-06-21 RX ORDER — DIAZEPAM 5 MG/1
5 TABLET ORAL
Status: DISCONTINUED | OUTPATIENT
Start: 2021-06-21 | End: 2021-06-28 | Stop reason: HOSPADM

## 2021-06-21 RX ORDER — LABETALOL 20 MG/4 ML (5 MG/ML) INTRAVENOUS SYRINGE
AS NEEDED
Status: DISCONTINUED | OUTPATIENT
Start: 2021-06-21 | End: 2021-06-21

## 2021-06-21 RX ORDER — MAGNESIUM HYDROXIDE 1200 MG/15ML
LIQUID ORAL AS NEEDED
Status: DISCONTINUED | OUTPATIENT
Start: 2021-06-21 | End: 2021-06-21 | Stop reason: HOSPADM

## 2021-06-21 RX ORDER — METOCLOPRAMIDE HYDROCHLORIDE 5 MG/ML
10 INJECTION INTRAMUSCULAR; INTRAVENOUS ONCE AS NEEDED
Status: DISCONTINUED | OUTPATIENT
Start: 2021-06-21 | End: 2021-06-21 | Stop reason: HOSPADM

## 2021-06-21 RX ORDER — ASPIRIN 81 MG/1
162 TABLET ORAL DAILY
Status: DISCONTINUED | OUTPATIENT
Start: 2021-06-22 | End: 2021-06-28 | Stop reason: HOSPADM

## 2021-06-21 RX ORDER — CHLORHEXIDINE GLUCONATE 0.12 MG/ML
15 RINSE ORAL ONCE
Status: DISCONTINUED | OUTPATIENT
Start: 2021-06-21 | End: 2021-06-21

## 2021-06-21 RX ORDER — NEOSTIGMINE METHYLSULFATE 1 MG/ML
INJECTION INTRAVENOUS AS NEEDED
Status: DISCONTINUED | OUTPATIENT
Start: 2021-06-21 | End: 2021-06-21

## 2021-06-21 RX ORDER — FENTANYL CITRATE 50 UG/ML
INJECTION, SOLUTION INTRAMUSCULAR; INTRAVENOUS
Status: COMPLETED | OUTPATIENT
Start: 2021-06-21 | End: 2021-06-21

## 2021-06-21 RX ORDER — PROMETHAZINE HYDROCHLORIDE 25 MG/ML
25 INJECTION, SOLUTION INTRAMUSCULAR; INTRAVENOUS ONCE AS NEEDED
Status: DISCONTINUED | OUTPATIENT
Start: 2021-06-21 | End: 2021-06-21 | Stop reason: HOSPADM

## 2021-06-21 RX ORDER — HYDROMORPHONE HCL 110MG/55ML
PATIENT CONTROLLED ANALGESIA SYRINGE INTRAVENOUS AS NEEDED
Status: DISCONTINUED | OUTPATIENT
Start: 2021-06-21 | End: 2021-06-21

## 2021-06-21 RX ORDER — PROPOFOL 10 MG/ML
INJECTION, EMULSION INTRAVENOUS AS NEEDED
Status: DISCONTINUED | OUTPATIENT
Start: 2021-06-21 | End: 2021-06-21

## 2021-06-21 RX ORDER — DEXAMETHASONE SODIUM PHOSPHATE 4 MG/ML
INJECTION, SOLUTION INTRA-ARTICULAR; INTRALESIONAL; INTRAMUSCULAR; INTRAVENOUS; SOFT TISSUE AS NEEDED
Status: DISCONTINUED | OUTPATIENT
Start: 2021-06-21 | End: 2021-06-21

## 2021-06-21 RX ORDER — MAGNESIUM HYDROXIDE/ALUMINUM HYDROXICE/SIMETHICONE 120; 1200; 1200 MG/30ML; MG/30ML; MG/30ML
30 SUSPENSION ORAL EVERY 6 HOURS PRN
Status: DISCONTINUED | OUTPATIENT
Start: 2021-06-21 | End: 2021-06-28 | Stop reason: HOSPADM

## 2021-06-21 RX ORDER — CARISOPRODOL 350 MG/1
350 TABLET ORAL 4 TIMES DAILY
Status: DISCONTINUED | OUTPATIENT
Start: 2021-06-21 | End: 2021-06-26

## 2021-06-21 RX ORDER — MIDAZOLAM HYDROCHLORIDE 2 MG/2ML
INJECTION, SOLUTION INTRAMUSCULAR; INTRAVENOUS
Status: COMPLETED | OUTPATIENT
Start: 2021-06-21 | End: 2021-06-21

## 2021-06-21 RX ORDER — HYDROMORPHONE HCL/PF 1 MG/ML
0.5 SYRINGE (ML) INJECTION
Status: DISCONTINUED | OUTPATIENT
Start: 2021-06-21 | End: 2021-06-21 | Stop reason: HOSPADM

## 2021-06-21 RX ORDER — HYDROMORPHONE HCL/PF 1 MG/ML
0.5 SYRINGE (ML) INJECTION EVERY 4 HOURS PRN
Status: DISCONTINUED | OUTPATIENT
Start: 2021-06-21 | End: 2021-06-24

## 2021-06-21 RX ORDER — FENTANYL CITRATE/PF 50 MCG/ML
50 SYRINGE (ML) INJECTION
Status: DISCONTINUED | OUTPATIENT
Start: 2021-06-21 | End: 2021-06-21 | Stop reason: HOSPADM

## 2021-06-21 RX ORDER — SODIUM CHLORIDE, SODIUM LACTATE, POTASSIUM CHLORIDE, CALCIUM CHLORIDE 600; 310; 30; 20 MG/100ML; MG/100ML; MG/100ML; MG/100ML
125 INJECTION, SOLUTION INTRAVENOUS CONTINUOUS
Status: DISCONTINUED | OUTPATIENT
Start: 2021-06-21 | End: 2021-06-21

## 2021-06-21 RX ORDER — IBUPROFEN 200 MG
400 TABLET ORAL EVERY 6 HOURS PRN
COMMUNITY
End: 2021-06-28 | Stop reason: HOSPADM

## 2021-06-21 RX ORDER — OXYCODONE HYDROCHLORIDE 5 MG/1
5 TABLET ORAL EVERY 4 HOURS PRN
Qty: 30 TABLET | Refills: 0 | Status: SHIPPED | OUTPATIENT
Start: 2021-06-21 | End: 2021-07-01

## 2021-06-21 RX ORDER — SODIUM CHLORIDE, SODIUM LACTATE, POTASSIUM CHLORIDE, CALCIUM CHLORIDE 600; 310; 30; 20 MG/100ML; MG/100ML; MG/100ML; MG/100ML
100 INJECTION, SOLUTION INTRAVENOUS CONTINUOUS
Status: DISCONTINUED | OUTPATIENT
Start: 2021-06-21 | End: 2021-06-25

## 2021-06-21 RX ORDER — ACETAMINOPHEN 325 MG/1
975 TABLET ORAL ONCE
Status: COMPLETED | OUTPATIENT
Start: 2021-06-21 | End: 2021-06-21

## 2021-06-21 RX ORDER — ROPIVACAINE HYDROCHLORIDE 5 MG/ML
INJECTION, SOLUTION EPIDURAL; INFILTRATION; PERINEURAL
Status: COMPLETED | OUTPATIENT
Start: 2021-06-21 | End: 2021-06-21

## 2021-06-21 RX ORDER — ONDANSETRON 2 MG/ML
4 INJECTION INTRAMUSCULAR; INTRAVENOUS EVERY 6 HOURS PRN
Status: DISCONTINUED | OUTPATIENT
Start: 2021-06-21 | End: 2021-06-28 | Stop reason: HOSPADM

## 2021-06-21 RX ORDER — DOCUSATE SODIUM 100 MG/1
100 CAPSULE, LIQUID FILLED ORAL 2 TIMES DAILY
Status: DISCONTINUED | OUTPATIENT
Start: 2021-06-21 | End: 2021-06-28 | Stop reason: HOSPADM

## 2021-06-21 RX ORDER — ASPIRIN 81 MG/1
162 TABLET ORAL DAILY
COMMUNITY

## 2021-06-21 RX ORDER — ROCURONIUM BROMIDE 10 MG/ML
INJECTION, SOLUTION INTRAVENOUS AS NEEDED
Status: DISCONTINUED | OUTPATIENT
Start: 2021-06-21 | End: 2021-06-21

## 2021-06-21 RX ORDER — CEFAZOLIN SODIUM 2 G/50ML
2000 SOLUTION INTRAVENOUS EVERY 8 HOURS
Status: COMPLETED | OUTPATIENT
Start: 2021-06-21 | End: 2021-06-22

## 2021-06-21 RX ORDER — NICOTINE 21 MG/24HR
21 PATCH, TRANSDERMAL 24 HOURS TRANSDERMAL DAILY
Status: DISCONTINUED | OUTPATIENT
Start: 2021-06-21 | End: 2021-06-28 | Stop reason: HOSPADM

## 2021-06-21 RX ORDER — DIPHENHYDRAMINE HYDROCHLORIDE 50 MG/ML
12.5 INJECTION INTRAMUSCULAR; INTRAVENOUS ONCE AS NEEDED
Status: DISCONTINUED | OUTPATIENT
Start: 2021-06-21 | End: 2021-06-21 | Stop reason: HOSPADM

## 2021-06-21 RX ORDER — DEXMEDETOMIDINE HYDROCHLORIDE 100 UG/ML
INJECTION, SOLUTION INTRAVENOUS AS NEEDED
Status: DISCONTINUED | OUTPATIENT
Start: 2021-06-21 | End: 2021-06-21

## 2021-06-21 RX ORDER — KETAMINE HCL IN NACL, ISO-OSM 100MG/10ML
SYRINGE (ML) INJECTION AS NEEDED
Status: DISCONTINUED | OUTPATIENT
Start: 2021-06-21 | End: 2021-06-21

## 2021-06-21 RX ORDER — ONDANSETRON 2 MG/ML
INJECTION INTRAMUSCULAR; INTRAVENOUS AS NEEDED
Status: DISCONTINUED | OUTPATIENT
Start: 2021-06-21 | End: 2021-06-21

## 2021-06-21 RX ORDER — DULOXETIN HYDROCHLORIDE 30 MG/1
60 CAPSULE, DELAYED RELEASE ORAL DAILY
Status: DISCONTINUED | OUTPATIENT
Start: 2021-06-22 | End: 2021-06-28 | Stop reason: HOSPADM

## 2021-06-21 RX ORDER — ZOLPIDEM TARTRATE 5 MG/1
10 TABLET ORAL
Status: DISCONTINUED | OUTPATIENT
Start: 2021-06-21 | End: 2021-06-26

## 2021-06-21 RX ORDER — GLYCOPYRROLATE 0.2 MG/ML
INJECTION INTRAMUSCULAR; INTRAVENOUS AS NEEDED
Status: DISCONTINUED | OUTPATIENT
Start: 2021-06-21 | End: 2021-06-21

## 2021-06-21 RX ORDER — MEPERIDINE HYDROCHLORIDE 50 MG/ML
12.5 INJECTION INTRAMUSCULAR; INTRAVENOUS; SUBCUTANEOUS ONCE AS NEEDED
Status: DISCONTINUED | OUTPATIENT
Start: 2021-06-21 | End: 2021-06-21 | Stop reason: HOSPADM

## 2021-06-21 RX ORDER — NICOTINE 21 MG/24HR
1 PATCH, TRANSDERMAL 24 HOURS TRANSDERMAL DAILY
Status: DISCONTINUED | OUTPATIENT
Start: 2021-06-22 | End: 2021-06-21

## 2021-06-21 RX ORDER — CEFAZOLIN SODIUM 2 G/50ML
2000 SOLUTION INTRAVENOUS ONCE
Status: COMPLETED | OUTPATIENT
Start: 2021-06-21 | End: 2021-06-21

## 2021-06-21 RX ORDER — OXYCODONE HYDROCHLORIDE 10 MG/1
10 TABLET ORAL EVERY 4 HOURS PRN
Status: DISCONTINUED | OUTPATIENT
Start: 2021-06-21 | End: 2021-06-28 | Stop reason: HOSPADM

## 2021-06-21 RX ADMIN — HYDROMORPHONE HYDROCHLORIDE 0.5 MG: 2 INJECTION, SOLUTION INTRAMUSCULAR; INTRAVENOUS; SUBCUTANEOUS at 12:20

## 2021-06-21 RX ADMIN — Medication 20 MG: at 11:23

## 2021-06-21 RX ADMIN — ROPIVACAINE HYDROCHLORIDE 20 ML: 5 INJECTION, SOLUTION EPIDURAL; INFILTRATION; PERINEURAL at 10:35

## 2021-06-21 RX ADMIN — DEXMEDETOMIDINE 8 MCG: 100 INJECTION, SOLUTION, CONCENTRATE INTRAVENOUS at 10:59

## 2021-06-21 RX ADMIN — NICOTINE 21 MG: 21 PATCH, EXTENDED RELEASE TRANSDERMAL at 17:48

## 2021-06-21 RX ADMIN — Medication 20 MG: at 10:55

## 2021-06-21 RX ADMIN — ROCURONIUM BROMIDE 10 MG: 10 INJECTION, SOLUTION INTRAVENOUS at 12:51

## 2021-06-21 RX ADMIN — DIAZEPAM 5 MG: 5 TABLET ORAL at 23:46

## 2021-06-21 RX ADMIN — ROCURONIUM BROMIDE 50 MG: 10 INJECTION, SOLUTION INTRAVENOUS at 10:56

## 2021-06-21 RX ADMIN — FENTANYL CITRATE 50 MCG: 50 INJECTION INTRAMUSCULAR; INTRAVENOUS at 13:55

## 2021-06-21 RX ADMIN — NEOSTIGMINE METHYLSULFATE 3 MG: 1 INJECTION INTRAVENOUS at 13:07

## 2021-06-21 RX ADMIN — DEXMEDETOMIDINE 8 MCG: 100 INJECTION, SOLUTION, CONCENTRATE INTRAVENOUS at 11:23

## 2021-06-21 RX ADMIN — ACETAMINOPHEN 975 MG: 325 TABLET, FILM COATED ORAL at 10:29

## 2021-06-21 RX ADMIN — Medication 10 MG: at 11:37

## 2021-06-21 RX ADMIN — FENTANYL CITRATE 50 MCG: 50 INJECTION, SOLUTION INTRAMUSCULAR; INTRAVENOUS at 12:49

## 2021-06-21 RX ADMIN — MIDAZOLAM HYDROCHLORIDE 2 MG: 1 INJECTION, SOLUTION INTRAMUSCULAR; INTRAVENOUS at 10:35

## 2021-06-21 RX ADMIN — GLYCOPYRROLATE 0.6 MG: 0.2 INJECTION, SOLUTION INTRAMUSCULAR; INTRAVENOUS at 13:07

## 2021-06-21 RX ADMIN — ONDANSETRON 4 MG: 2 INJECTION INTRAMUSCULAR; INTRAVENOUS at 13:07

## 2021-06-21 RX ADMIN — DEXMEDETOMIDINE 8 MCG: 100 INJECTION, SOLUTION, CONCENTRATE INTRAVENOUS at 11:37

## 2021-06-21 RX ADMIN — SODIUM CHLORIDE, SODIUM LACTATE, POTASSIUM CHLORIDE, AND CALCIUM CHLORIDE: .6; .31; .03; .02 INJECTION, SOLUTION INTRAVENOUS at 10:49

## 2021-06-21 RX ADMIN — HYDROMORPHONE HYDROCHLORIDE 0.4 MG: 2 INJECTION, SOLUTION INTRAMUSCULAR; INTRAVENOUS; SUBCUTANEOUS at 12:33

## 2021-06-21 RX ADMIN — SODIUM CHLORIDE, SODIUM LACTATE, POTASSIUM CHLORIDE, AND CALCIUM CHLORIDE 100 ML/HR: .6; .31; .03; .02 INJECTION, SOLUTION INTRAVENOUS at 23:57

## 2021-06-21 RX ADMIN — CEFAZOLIN SODIUM 2000 MG: 2 SOLUTION INTRAVENOUS at 20:26

## 2021-06-21 RX ADMIN — PROPOFOL 200 MG: 10 INJECTION, EMULSION INTRAVENOUS at 10:55

## 2021-06-21 RX ADMIN — OXYCODONE HYDROCHLORIDE 10 MG: 10 TABLET ORAL at 17:56

## 2021-06-21 RX ADMIN — HYDROMORPHONE HYDROCHLORIDE 0.5 MG: 1 INJECTION, SOLUTION INTRAMUSCULAR; INTRAVENOUS; SUBCUTANEOUS at 20:29

## 2021-06-21 RX ADMIN — ZOLPIDEM TARTRATE 10 MG: 5 TABLET, COATED ORAL at 23:46

## 2021-06-21 RX ADMIN — HYDROMORPHONE HYDROCHLORIDE 0.6 MG: 2 INJECTION, SOLUTION INTRAMUSCULAR; INTRAVENOUS; SUBCUTANEOUS at 12:38

## 2021-06-21 RX ADMIN — FENTANYL CITRATE 50 MCG: 50 INJECTION, SOLUTION INTRAMUSCULAR; INTRAVENOUS at 12:45

## 2021-06-21 RX ADMIN — FENTANYL CITRATE 100 MCG: 50 INJECTION, SOLUTION INTRAMUSCULAR; INTRAVENOUS at 10:35

## 2021-06-21 RX ADMIN — SODIUM CHLORIDE, SODIUM LACTATE, POTASSIUM CHLORIDE, AND CALCIUM CHLORIDE 100 ML/HR: .6; .31; .03; .02 INJECTION, SOLUTION INTRAVENOUS at 16:55

## 2021-06-21 RX ADMIN — CARISOPRODOL 350 MG: 350 TABLET ORAL at 23:46

## 2021-06-21 RX ADMIN — HYDROMORPHONE HYDROCHLORIDE 0.5 MG: 2 INJECTION, SOLUTION INTRAMUSCULAR; INTRAVENOUS; SUBCUTANEOUS at 12:15

## 2021-06-21 RX ADMIN — SODIUM CHLORIDE, SODIUM LACTATE, POTASSIUM CHLORIDE, AND CALCIUM CHLORIDE: .6; .31; .03; .02 INJECTION, SOLUTION INTRAVENOUS at 12:50

## 2021-06-21 RX ADMIN — OXYCODONE HYDROCHLORIDE 5 MG: 5 TABLET ORAL at 23:41

## 2021-06-21 RX ADMIN — DEXMEDETOMIDINE 8 MCG: 100 INJECTION, SOLUTION, CONCENTRATE INTRAVENOUS at 11:04

## 2021-06-21 RX ADMIN — LABETALOL 20 MG/4 ML (5 MG/ML) INTRAVENOUS SYRINGE 10 MG: at 12:49

## 2021-06-21 RX ADMIN — CARISOPRODOL 350 MG: 350 TABLET ORAL at 17:48

## 2021-06-21 RX ADMIN — CEFAZOLIN SODIUM 2000 MG: 2 SOLUTION INTRAVENOUS at 10:48

## 2021-06-21 RX ADMIN — DOCUSATE SODIUM 100 MG: 100 CAPSULE, LIQUID FILLED ORAL at 17:48

## 2021-06-21 RX ADMIN — DEXAMETHASONE SODIUM PHOSPHATE 4 MG: 4 INJECTION, SOLUTION INTRAMUSCULAR; INTRAVENOUS at 11:01

## 2021-06-21 NOTE — ANESTHESIA POSTPROCEDURE EVALUATION
Post-Op Assessment Note    CV Status:  Stable  Pain Score: 0    Pain management: adequate     Mental Status:  Alert and awake   Hydration Status:  Euvolemic   PONV Controlled:  Controlled   Airway Patency:  Patent      Post Op Vitals Reviewed: Yes      Staff: CRNA         No complications documented      /70 (06/21/21 1337)    Temp 98 7 °F (37 1 °C) (06/21/21 1337)    Pulse 90 (06/21/21 1337)   Resp 16 (06/21/21 1337)    SpO2 93 % (06/21/21 1337)

## 2021-06-21 NOTE — ANESTHESIA PREPROCEDURE EVALUATION
Procedure:  OPEN REDUCTION W/ INTERNAL FIXATION (ORIF) RIGHT ANKLE BIMALLEOLAR FRACTURE (Right Ankle)    Relevant Problems   CARDIO   (+) HTN (hypertension)      PULMONARY   (+) Smoking      Chronic back pain  Migraine    Hypertension  Non Hodgkin's lymphoma (HCC)             Anesthesia Plan  ASA Score- 3     Anesthesia Type- general and regional with ASA Monitors  Additional Monitors:   Airway Plan: ETT  Plan Factors-    Chart reviewed  EKG reviewed  Existing labs reviewed  Patient summary reviewed  Patient is a current smoker  Induction- intravenous  Postoperative Plan- Plan for postoperative opioid use  Planned trial extubation    Informed Consent- Anesthetic plan and risks discussed with patient  I personally reviewed this patient with the CRNA  Discussed and agreed on the Anesthesia Plan with the CRNA             60 y o  female who presents to the office today for a right ankle fracture  Khadijah twisted her right ankle on 6/09/2021 due to a fall down the steps  She presented to the ED on 6/11/2021, at that time x-rays were performed, a ankle reduction was performed and Khadijah was placed into a splint  She has been NWB to RLE with the use of a walker  Khadijah states that she does rest the foot on the ground at times but does not put weight on it  She notes pain to her right ankle  Khadijah is taking Percocet for pain control  She denies associated numbness/tingling  Khadijah is a smoker and smoks aprox   1 5 packs of cigarettes a day

## 2021-06-21 NOTE — INTERVAL H&P NOTE
H&P reviewed  After examining the patient I find no changes in the patients condition since the H&P had been written  Patient was seen and evaluated in the office with Dr Stevie Saldana where x-rays were reviewed and surgical intervention was recommended  Risks and benefits of surgery were discussed with patient as seen in previous office note, detailed consent was reviewed and signed in office  Patient will go to OR with Dr Stevie Saldana on 06/21/2021 for ORIF Right bimalleolar ankle fracture       Heart: RRR  Lungs: CTA B/L      Vitals:    06/21/21 1015   BP: (!) 172/83   Pulse: 85   Resp: 20   Temp: 98 4 °F (36 9 °C)   SpO2: 97%

## 2021-06-21 NOTE — OP NOTE
OPERATIVE REPORT  PATIENT NAME: Corwin Ramirez    :  1965  MRN: [id number]  Pt Location: [facility] OR ROOM 03    SURGERY DATE: 2021    Surgeon(s) and Role:     * Josi Deluca MD - Primary     * Waqar Johnson PA-C - Assisting    Preop Diagnosis:  Right ankle displaced bimalleolar ankle fracture    Post-Op Diagnosis Codes:  Same    Procedure(s) (LRB):  OPEN REDUCTION W/ INTERNAL FIXATION (ORIF) RIGHT ANKLE BIMALLEOLAR FRACTURE (Right)    Procedures:  ORIF R ankle bimalleolar fracture (CPT 88690)  Application RLE short leg splint (CPT 13862)      Specimen(s):  * No specimens in log *    Estimated Blood Loss:   Minimal    Drains:  * No LDAs found *    Anesthesia Type:   General    Operative Indications:  Displaced R ankle bimalleolar fracture in ambulatory patient    Operative Findings:  See below    Complications:   None    Implants: Synthes 6 hl LCP distal fibula plate, 4 0 mm cannulated screws x2    Procedure and Technique:  The patient is a 52-year-old pack-a-day smoker who sustained a right ankle bimalleolar ankle fracture  Unfortunately, the patient appeared subluxated in her splint as well as the patient had removed her splint in the outpatient setting creating a persistently subluxated ankle joint  The patient's operative site, laterality, procedure, consent were verified in the preoperative area the patient transitioned to the operating room  General anesthesia was provided by the anesthesia team and the right lower extremity was prepped and draped in the usual sterile fashion  After time-out for safety, direct lateral approach to the fibula took place in the fracture site was easily identified  There was noted to be significantly wide lateral tilt of the talus and simple maneuvers of the fibula did not allow for correction of fibular length or talar tilt    As such, medial curvilinear incision took place over the medial malleolus and significant debris was removed from the medial gutter including hematoma and early callus and apparently devitalized joint tissue  The medial malleolus was then aligned and held in place with wires  Attempts to restore fibular length were again unsuccessful  As such, the Synthes 6 hole LCP distal fibula plate was then selected and applied to the lateral bone and distal locking cluster was then placed as well as additional K-wires to affix the distal plate to bone  Proximal to the proximal portion of plate, a 3 5 mm cortical screw was placed into the fibular shaft  A laminar  was then utilized in a push-pull manner to restore our fibular length, additional pointed reduction clamp was placed after length was restored to capture primary fracture obliquity and 2 cortical screws were placed while this reduction was maintained  Remaining proximal cortical screws and distal locking cluster replaced without incident  After laminar  and pointed reduction clamps were removed, reduction was found to be satisfactory  Medial initial the wires holding medial malleolus were then replaced with 4 0 mm cannulated screws which provided compression at the medial malleolar fracture  To provide additional fixation given the high energy level injury and significant force to enact reduction on the fibula, additional tricortical cortical screw was placed through the fibular plate  The ankle was then taken through a dorsiflexion and external rotation stress test under fluoroscopic imaging and the wrist found to be no increased medial clear space widening and no loss of tibiofibular overlap  The wounds were then copiously irrigated with sterile saline  Deep layer closure over the fibular plate took place with 0 Vicryl suture Subcutaneous layer closure took place with 2-0 Vicryl suture, skin was closed with 3-0 nylon suture in Donati-simple fashion  Sterile dressing consisted of Steri-Strips, Adaptic, sterile gauze, sterile ABD pad, sterile Webril    The patient was placed in a well-padded plaster posterior leg splint with stirrup  All final counts, including but not limited to instrument, sponge, needle counts were correct  I was present for the entire procedure  The patient was extubated and awakened and transitioned to the PACU in stable condition  There were no immediate complications  No qualified resident was available for the procedure  Critical assistance from Yvette Baer PA-C was required for all components of the position including soft tissue retraction, positioning, assistance with maintenance and reduction of fracture and passage of instrumentation which was particularly important given the patient's difficult fracture  The patient will be nonweightbearing on the right lower extremity for an anticipated 6 weeks  She will require deep vein thrombosis prophylaxis for 6 weeks    I will see her in the clinic in 2 weeks for suture removal     Patient Disposition:  PACU     SIGNATURE: Emely Pulido MD  DATE: June 21, 2021  TIME: 5:44 PM

## 2021-06-21 NOTE — ANESTHESIA PROCEDURE NOTES
Peripheral Block    Patient location during procedure: pre-op  Start time: 6/21/2021 10:31 AM  Reason for block: at surgeon's request and post-op pain management  Staffing  Performed: anesthesiologist   Anesthesiologist: Roosevelt Thompson MD  Preanesthetic Checklist  Completed: patient identified, IV checked, site marked, risks and benefits discussed, surgical consent, monitors and equipment checked, pre-op evaluation and timeout performed  Peripheral Block  Patient position: sitting  Prep: ChloraPrep  Patient monitoring: heart rate, cardiac monitor, continuous pulse ox and frequent blood pressure checks  Block type: popliteal  Laterality: right  Injection technique: single-shot  Procedures: ultrasound guided, Ultrasound guidance required for the procedure to increase accuracy and safety of medication placement and decrease risk of complications    Ultrasound permanent image savedropivacaine (NAROPIN) 0 5 % perineural infiltration, 20 mL  midazolam (VERSED) 2 mg/2 mL IV, 2 mg  fentaNYL 50 mcg/mL IV, 100 mcg  Needle  Needle type: Stimuplex   Needle gauge: 22 G  Needle length: 10 cm  Needle localization: ultrasound guidance  Needle insertion depth: 5 cm  Test dose: negative  Assessment  Injection assessment: incremental injection, local visualized surrounding nerve on ultrasound, negative aspiration for heme and no paresthesia on injection  Heart rate change: no  Slow fractionated injection: yes  Post-procedure:  site cleaned  patient tolerated the procedure well with no immediate complications

## 2021-06-21 NOTE — PLAN OF CARE
Problem: MOBILITY - ADULT  Goal: Maintain or return to baseline ADL function  Description: INTERVENTIONS:  -  Assess patient's ability to carry out ADLs; assess patient's baseline for ADL function and identify physical deficits which impact ability to perform ADLs (bathing, care of mouth/teeth, toileting, grooming, dressing, etc )  - Assess/evaluate cause of self-care deficits   - Assess range of motion  - Assess patient's mobility; develop plan if impaired  - Assess patient's need for assistive devices and provide as appropriate  - Encourage maximum independence but intervene and supervise when necessary  - Involve family in performance of ADLs  - Assess for home care needs following discharge   - Consider OT consult to assist with ADL evaluation and planning for discharge  - Provide patient education as appropriate  Outcome: Progressing  Goal: Maintains/Returns to pre admission functional level  Description: INTERVENTIONS:  - Perform BMAT or MOVE assessment daily    - Set and communicate daily mobility goal to care team and patient/family/caregiver  - Collaborate with rehabilitation services on mobility goals if consulted  - Perform Range of Motion 2 times a day  - Reposition patient every 2 hours    - Dangle patient 2 times a day  - Stand patient 2 times a day  - Ambulate patient 2 times a day  - Out of bed to chair 2 times a day   - Out of bed for meals 2 times a day  - Out of bed for toileting  - Record patient progress and toleration of activity level   Outcome: Progressing     Problem: PAIN - ADULT  Goal: Verbalizes/displays adequate comfort level or baseline comfort level  Description: Interventions:  - Encourage patient to monitor pain and request assistance  - Assess pain using appropriate pain scale  - Administer analgesics based on type and severity of pain and evaluate response  - Implement non-pharmacological measures as appropriate and evaluate response  - Consider cultural and social influences on pain and pain management  - Notify physician/advanced practitioner if interventions unsuccessful or patient reports new pain  Outcome: Progressing     Problem: SAFETY ADULT  Goal: Patient will remain free of falls  Description: INTERVENTIONS:  - Educate patient/family on patient safety including physical limitations  - Instruct patient to call for assistance with activity   - Consult OT/PT to assist with strengthening/mobility   - Keep Call bell within reach  - Keep bed low and locked with side rails adjusted as appropriate  - Keep care items and personal belongings within reach  - Initiate and maintain comfort rounds  - Make Fall Risk Sign visible to staff  - Offer Toileting every 2 Hours, in advance of need  - Initiate/Maintain 2alarm  - Obtain necessary fall risk management equipment:   - Apply yellow socks and bracelet for high fall risk patients  - Consider moving patient to room near nurses station  Outcome: Progressing     Problem: DISCHARGE PLANNING  Goal: Discharge to home or other facility with appropriate resources  Description: INTERVENTIONS:  - Identify barriers to discharge w/patient and caregiver  - Arrange for needed discharge resources and transportation as appropriate  - Identify discharge learning needs (meds, wound care, etc )  - Arrange for interpretive services to assist at discharge as needed  - Refer to Case Management Department for coordinating discharge planning if the patient needs post-hospital services based on physician/advanced practitioner order or complex needs related to functional status, cognitive ability, or social support system  Outcome: Progressing     Problem: Knowledge Deficit  Goal: Patient/family/caregiver demonstrates understanding of disease process, treatment plan, medications, and discharge instructions  Description: Complete learning assessment and assess knowledge base    Interventions:  - Provide teaching at level of understanding  - Provide teaching via preferred learning methods  Outcome: Progressing

## 2021-06-21 NOTE — TELEPHONE ENCOUNTER
Patient sees Dr Ana Laura Aparicio at [facility] [place] calling about the script Oxycodone 5 mg prescribed  She wanted to let Dr know the patient is also taking Percocet 7 5  325 mg, does the doctor want to prescribe this knowing she is taking the Percocet?     Call back Chele Joshua to confirm,  [phone]

## 2021-06-22 LAB
ANION GAP SERPL CALCULATED.3IONS-SCNC: 11 MMOL/L (ref 4–13)
ATRIAL RATE: 80 BPM
BASOPHILS # BLD AUTO: 0.04 THOUSANDS/ΜL (ref 0–0.1)
BASOPHILS NFR BLD AUTO: 0 % (ref 0–1)
BUN SERPL-MCNC: 13 MG/DL (ref 5–25)
CALCIUM SERPL-MCNC: 8.7 MG/DL (ref 8.3–10.1)
CHLORIDE SERPL-SCNC: 105 MMOL/L (ref 100–108)
CO2 SERPL-SCNC: 25 MMOL/L (ref 21–32)
CREAT SERPL-MCNC: 0.69 MG/DL (ref 0.6–1.3)
EOSINOPHIL # BLD AUTO: 0.01 THOUSAND/ΜL (ref 0–0.61)
EOSINOPHIL NFR BLD AUTO: 0 % (ref 0–6)
ERYTHROCYTE [DISTWIDTH] IN BLOOD BY AUTOMATED COUNT: 13 % (ref 11.6–15.1)
GFR SERPL CREATININE-BSD FRML MDRD: 97 ML/MIN/1.73SQ M
GLUCOSE SERPL-MCNC: 139 MG/DL (ref 65–140)
HCT VFR BLD AUTO: 32.6 % (ref 34.8–46.1)
HGB BLD-MCNC: 10.8 G/DL (ref 11.5–15.4)
IMM GRANULOCYTES # BLD AUTO: 0.07 THOUSAND/UL (ref 0–0.2)
IMM GRANULOCYTES NFR BLD AUTO: 1 % (ref 0–2)
LYMPHOCYTES # BLD AUTO: 2.21 THOUSANDS/ΜL (ref 0.6–4.47)
LYMPHOCYTES NFR BLD AUTO: 15 % (ref 14–44)
MCH RBC QN AUTO: 32.9 PG (ref 26.8–34.3)
MCHC RBC AUTO-ENTMCNC: 33.1 G/DL (ref 31.4–37.4)
MCV RBC AUTO: 99 FL (ref 82–98)
MONOCYTES # BLD AUTO: 1.01 THOUSAND/ΜL (ref 0.17–1.22)
MONOCYTES NFR BLD AUTO: 7 % (ref 4–12)
NEUTROPHILS # BLD AUTO: 10.97 THOUSANDS/ΜL (ref 1.85–7.62)
NEUTS SEG NFR BLD AUTO: 77 % (ref 43–75)
NRBC BLD AUTO-RTO: 0 /100 WBCS
P AXIS: 58 DEGREES
PLATELET # BLD AUTO: 330 THOUSANDS/UL (ref 149–390)
PMV BLD AUTO: 9 FL (ref 8.9–12.7)
POTASSIUM SERPL-SCNC: 3.9 MMOL/L (ref 3.5–5.3)
PR INTERVAL: 130 MS
QRS AXIS: 59 DEGREES
QRSD INTERVAL: 82 MS
QT INTERVAL: 362 MS
QTC INTERVAL: 417 MS
RBC # BLD AUTO: 3.28 MILLION/UL (ref 3.81–5.12)
SODIUM SERPL-SCNC: 141 MMOL/L (ref 136–145)
T WAVE AXIS: 61 DEGREES
VENTRICULAR RATE: 80 BPM
WBC # BLD AUTO: 14.31 THOUSAND/UL (ref 4.31–10.16)

## 2021-06-22 PROCEDURE — 99024 POSTOP FOLLOW-UP VISIT: CPT | Performed by: PHYSICIAN ASSISTANT

## 2021-06-22 PROCEDURE — 80048 BASIC METABOLIC PNL TOTAL CA: CPT | Performed by: PHYSICIAN ASSISTANT

## 2021-06-22 PROCEDURE — 93010 ELECTROCARDIOGRAM REPORT: CPT | Performed by: INTERNAL MEDICINE

## 2021-06-22 PROCEDURE — 85025 COMPLETE CBC W/AUTO DIFF WBC: CPT | Performed by: PHYSICIAN ASSISTANT

## 2021-06-22 RX ADMIN — OXYCODONE HYDROCHLORIDE 10 MG: 10 TABLET ORAL at 09:13

## 2021-06-22 RX ADMIN — DOCUSATE SODIUM 100 MG: 100 CAPSULE, LIQUID FILLED ORAL at 17:25

## 2021-06-22 RX ADMIN — OXYCODONE HYDROCHLORIDE 10 MG: 10 TABLET ORAL at 17:25

## 2021-06-22 RX ADMIN — HYDROMORPHONE HYDROCHLORIDE 0.5 MG: 1 INJECTION, SOLUTION INTRAMUSCULAR; INTRAVENOUS; SUBCUTANEOUS at 20:00

## 2021-06-22 RX ADMIN — CARISOPRODOL 350 MG: 350 TABLET ORAL at 17:25

## 2021-06-22 RX ADMIN — HYDROMORPHONE HYDROCHLORIDE 0.5 MG: 1 INJECTION, SOLUTION INTRAMUSCULAR; INTRAVENOUS; SUBCUTANEOUS at 11:45

## 2021-06-22 RX ADMIN — OXYCODONE HYDROCHLORIDE 10 MG: 10 TABLET ORAL at 04:59

## 2021-06-22 RX ADMIN — HYDROMORPHONE HYDROCHLORIDE 0.5 MG: 1 INJECTION, SOLUTION INTRAMUSCULAR; INTRAVENOUS; SUBCUTANEOUS at 07:34

## 2021-06-22 RX ADMIN — ASPIRIN 162 MG: 81 TABLET, COATED ORAL at 08:22

## 2021-06-22 RX ADMIN — ENOXAPARIN SODIUM 30 MG: 30 INJECTION SUBCUTANEOUS at 00:47

## 2021-06-22 RX ADMIN — ZOLPIDEM TARTRATE 10 MG: 5 TABLET, COATED ORAL at 21:28

## 2021-06-22 RX ADMIN — CEFAZOLIN SODIUM 2000 MG: 2 SOLUTION INTRAVENOUS at 11:06

## 2021-06-22 RX ADMIN — DOCUSATE SODIUM 100 MG: 100 CAPSULE, LIQUID FILLED ORAL at 08:22

## 2021-06-22 RX ADMIN — OXYCODONE HYDROCHLORIDE 10 MG: 10 TABLET ORAL at 13:13

## 2021-06-22 RX ADMIN — HYDROMORPHONE HYDROCHLORIDE 0.5 MG: 1 INJECTION, SOLUTION INTRAMUSCULAR; INTRAVENOUS; SUBCUTANEOUS at 00:46

## 2021-06-22 RX ADMIN — CEFAZOLIN SODIUM 2000 MG: 2 SOLUTION INTRAVENOUS at 03:54

## 2021-06-22 RX ADMIN — NICOTINE 21 MG: 21 PATCH, EXTENDED RELEASE TRANSDERMAL at 08:22

## 2021-06-22 RX ADMIN — DULOXETINE HYDROCHLORIDE 60 MG: 30 CAPSULE, DELAYED RELEASE ORAL at 08:22

## 2021-06-22 RX ADMIN — HYDROMORPHONE HYDROCHLORIDE 0.5 MG: 1 INJECTION, SOLUTION INTRAMUSCULAR; INTRAVENOUS; SUBCUTANEOUS at 16:04

## 2021-06-22 RX ADMIN — CARISOPRODOL 350 MG: 350 TABLET ORAL at 12:18

## 2021-06-22 RX ADMIN — CARISOPRODOL 350 MG: 350 TABLET ORAL at 08:22

## 2021-06-22 RX ADMIN — CARISOPRODOL 350 MG: 350 TABLET ORAL at 21:28

## 2021-06-22 RX ADMIN — DIAZEPAM 5 MG: 5 TABLET ORAL at 21:29

## 2021-06-22 RX ADMIN — SODIUM CHLORIDE, SODIUM LACTATE, POTASSIUM CHLORIDE, AND CALCIUM CHLORIDE 100 ML/HR: .6; .31; .03; .02 INJECTION, SOLUTION INTRAVENOUS at 21:28

## 2021-06-22 RX ADMIN — ENOXAPARIN SODIUM 30 MG: 30 INJECTION SUBCUTANEOUS at 13:13

## 2021-06-22 NOTE — PLAN OF CARE
Problem: MOBILITY - ADULT  Goal: Maintain or return to baseline ADL function  Description: INTERVENTIONS:  -  Assess patient's ability to carry out ADLs; assess patient's baseline for ADL function and identify physical deficits which impact ability to perform ADLs (bathing, care of mouth/teeth, toileting, grooming, dressing, etc )  - Assess/evaluate cause of self-care deficits   - Assess range of motion  - Assess patient's mobility; develop plan if impaired  - Assess patient's need for assistive devices and provide as appropriate  - Encourage maximum independence but intervene and supervise when necessary  - Involve family in performance of ADLs  - Assess for home care needs following discharge   - Consider OT consult to assist with ADL evaluation and planning for discharge  - Provide patient education as appropriate  Outcome: Progressing  Goal: Maintains/Returns to pre admission functional level  Description: INTERVENTIONS:  - Perform BMAT or MOVE assessment daily    - Set and communicate daily mobility goal to care team and patient/family/caregiver  - Collaborate with rehabilitation services on mobility goals if consulted  - Perform Range of Motion 4 times a day  - Reposition patient every 2 hours    - Dangle patient 4 times a day  - Stand patient 4 times a day  - Ambulate patient 2 times a day  - Out of bed to chair 1 times a day   - Out of bed for meals 3 times a day  - Out of bed for toileting  - Record patient progress and toleration of activity level   Outcome: Progressing     Problem: PAIN - ADULT  Goal: Verbalizes/displays adequate comfort level or baseline comfort level  Description: Interventions:  - Encourage patient to monitor pain and request assistance  - Assess pain using appropriate pain scale  - Administer analgesics based on type and severity of pain and evaluate response  - Implement non-pharmacological measures as appropriate and evaluate response  - Consider cultural and social influences on pain and pain management  - Notify physician/advanced practitioner if interventions unsuccessful or patient reports new pain  Outcome: Progressing     Problem: SAFETY ADULT  Goal: Patient will remain free of falls  Description: INTERVENTIONS:  - Educate patient/family on patient safety including physical limitations  - Instruct patient to call for assistance with activity   - Consult OT/PT to assist with strengthening/mobility   - Keep Call bell within reach  - Keep bed low and locked with side rails adjusted as appropriate  - Keep care items and personal belongings within reach  - Initiate and maintain comfort rounds  - Make Fall Risk Sign visible to staff  - Offer Toileting every 2 Hours, in advance of need  - Initiate/Maintain bed/chair alarm  - Obtain necessary fall risk management equipment: yellow bracelet and socks   - Apply yellow socks and bracelet for high fall risk patients  - Consider moving patient to room near nurses station  Outcome: Progressing     Problem: DISCHARGE PLANNING  Goal: Discharge to home or other facility with appropriate resources  Description: INTERVENTIONS:  - Identify barriers to discharge w/patient and caregiver  - Arrange for needed discharge resources and transportation as appropriate  - Identify discharge learning needs (meds, wound care, etc )  - Arrange for interpretive services to assist at discharge as needed  - Refer to Case Management Department for coordinating discharge planning if the patient needs post-hospital services based on physician/advanced practitioner order or complex needs related to functional status, cognitive ability, or social support system  Outcome: Progressing     Problem: Knowledge Deficit  Goal: Patient/family/caregiver demonstrates understanding of disease process, treatment plan, medications, and discharge instructions  Description: Complete learning assessment and assess knowledge base    Interventions:  - Provide teaching at level of understanding  - Provide teaching via preferred learning methods  Outcome: Progressing     Problem: Prexisting or High Potential for Compromised Skin Integrity  Goal: Skin integrity is maintained or improved  Description: INTERVENTIONS:  - Identify patients at risk for skin breakdown  - Assess and monitor skin integrity  - Assess and monitor nutrition and hydration status  - Monitor labs   - Assess for incontinence   - Turn and reposition patient  - Assist with mobility/ambulation  - Relieve pressure over bony prominences  - Avoid friction and shearing  - Provide appropriate hygiene as needed including keeping skin clean and dry  - Evaluate need for skin moisturizer/barrier cream  - Collaborate with interdisciplinary team   - Patient/family teaching  - Consider wound care consult   Outcome: Progressing     Problem: Potential for Falls  Goal: Patient will remain free of falls  Description: INTERVENTIONS:  - Educate patient/family on patient safety including physical limitations  - Instruct patient to call for assistance with activity   - Consult OT/PT to assist with strengthening/mobility   - Keep Call bell within reach  - Keep bed low and locked with side rails adjusted as appropriate  - Keep care items and personal belongings within reach  - Initiate and maintain comfort rounds  - Make Fall Risk Sign visible to staff  - Offer Toileting every 2 Hours, in advance of need  - Initiate/Maintain bed/ chair alarm  - Obtain necessary fall risk management equipment: yellow bracelet and socks   - Apply yellow socks and bracelet for high fall risk patients  - Consider moving patient to room near nurses station  Outcome: Progressing

## 2021-06-22 NOTE — PROGRESS NOTES
Progress Note - Orthopedics   Zoë Garrido 60 y o  female MRN: [id number]  Unit/Bed#: -01      Subjective:    60 y  o female POD#1 ORIF right bimalleolar ankle fracture  Patient states that her ankle is doing well overall  Patient states that she has pain on the medial and lateral aspect of the ankle and in particular over the heel  Patient denies any calf pain or thigh pain  Patient states she has been compliant with anticoagulation therapy  Patient states she has been compliant with nonweightbearing restrictions of the right lower extremity  Patient denies any fevers any chills  Patient denies any shortness of breath chest tightness chest pain  Patient denies any nausea vomiting diarrhea  Patient denies any numbness or tingling in the foot  Patient offers no other complaints at this time        Labs:  0   Lab Value Date/Time    HCT 32 6 (L) 06/22/2021 0506    HCT 38 0 06/21/2021 1034    HCT 38 0 12/22/2019 1751    HGB 10 8 (L) 06/22/2021 0506    HGB 12 6 06/21/2021 1034    HGB 12 2 12/22/2019 1751    WBC 14 31 (H) 06/22/2021 0506    WBC 10 87 (H) 06/21/2021 1034    WBC 7 40 12/22/2019 1751       Meds:    Current Facility-Administered Medications:     aluminum-magnesium hydroxide-simethicone (MYLANTA) oral suspension 30 mL, 30 mL, Oral, Q6H PRN, Afia Delaney PA-C    aspirin (ECOTRIN LOW STRENGTH) EC tablet 162 mg, 162 mg, Oral, Daily, Afia Delaney PA-C    carisoprodol (SOMA) tablet 350 mg, 350 mg, Oral, 4x Daily, Lasha Barnes PA-C, 350 mg at 06/21/21 2346    ceFAZolin (ANCEF) IVPB (premix in dextrose) 2,000 mg 50 mL, 2,000 mg, Intravenous, Q8H, Lasha Barnes PA-C, Last Rate: 100 mL/hr at 06/22/21 0354, 2,000 mg at 06/22/21 0354    diazepam (VALIUM) tablet 5 mg, 5 mg, Oral, HS, Lasha Barnes PA-C, 5 mg at 06/21/21 2346    docusate sodium (COLACE) capsule 100 mg, 100 mg, Oral, BID, Lasha Barnes PA-C, 100 mg at 06/21/21 1748    DULoxetine (Imtiaz James) delayed release capsule 60 mg, 60 mg, Oral, Daily, Lasha Barnes PA-C    enoxaparin (LOVENOX) subcutaneous injection 30 mg, 30 mg, Subcutaneous, Q12H [address], Lasha Barnes PA-C, 30 mg at 06/22/21 0047    HYDROmorphone (DILAUDID) injection 0 5 mg, 0 5 mg, Intravenous, Q4H PRN, Sanjuanita Travis PA-C, 0 5 mg at 06/22/21 0734    lactated ringers infusion, 100 mL/hr, Intravenous, Continuous, Sanjuanita Travis PA-C, Last Rate: 100 mL/hr at 06/21/21 2357, 100 mL/hr at 06/21/21 2357    nicotine (NICODERM CQ) 21 mg/24 hr TD 24 hr patch 21 mg, 21 mg, Transdermal, Daily, Lasha Barnes PA-C, 21 mg at 06/21/21 1748    ondansetron (ZOFRAN) injection 4 mg, 4 mg, Intravenous, Q6H PRN, Sanjuanita Travis PA-C    oxyCODONE (ROXICODONE) immediate release tablet 10 mg, 10 mg, Oral, Q4H PRN, Sanjuanita Travis PA-C, 10 mg at 06/22/21 0459    oxyCODONE (ROXICODONE) IR tablet 5 mg, 5 mg, Oral, Q4H PRN, YU Mcgregor-LISA, 5 mg at 06/21/21 2341    zolpidem (AMBIEN) tablet 10 mg, 10 mg, Oral, HS, Lasha Barnes PA-C, 10 mg at 06/21/21 2346    Blood Culture:   No results found for: BLOODCX    Wound Culture:   No results found for: WOUNDCULT    Ins and Outs:  I/O last 24 hours: In: 3080 [P O :880; I V :2200]  Out: 1100 [Urine:1100]          Physical:  Vitals:    06/22/21 0719   BP: 117/70   Pulse: 94   Resp: 18   Temp: 99 °F (37 2 °C)   SpO2: 94%     Musculoskeletal: right Lower Extremity  · Skin  :  No acute visible abnormalities present in the right lower extremity  Extremity appears well-perfused overall  · Dressing  :  Dressing is clean dry and intact with no visible soilage   · TTP   :   No bony or soft tissue tenderness palpation noted at this time  · SILT s/s/sp/dp/t  +fhl/ehl, +ankle dorsi/plantar flexion  2+ DP pulse      Assessment:    60 y  o female POD#1 ORIF right bimalleolar ankle fracture        Plan:  · Nonweightbearing right lower extremity  · PT/OT for ambulation assistance, gait training   · Pain control as directed  · DVT ppx as directed  · Dispo:  Ortho will continue to follow this time  Nonweightbearing right lower extremity  Elevate extremity as directed  Ice as needed for pain  Continue pain control as directed  Discussed signs and symptoms of compartment syndrome with patient at length          Heather Ruffin PA-C

## 2021-06-22 NOTE — PROGRESS NOTES
Patient complaining of pins and needles in the right foot and a shooting pain that starts at the ankle and up to the shin  Tiger texted [employer] about this  Awaiting response  6/22/2021   Jack Geiger RN    As per PA-C will continue to monitor patients symptoms       6/22/2021 Boris Mead RN

## 2021-06-22 NOTE — PLAN OF CARE
Problem: MOBILITY - ADULT  Goal: Maintain or return to baseline ADL function  Description: INTERVENTIONS:  -  Assess patient's ability to carry out ADLs; assess patient's baseline for ADL function and identify physical deficits which impact ability to perform ADLs (bathing, care of mouth/teeth, toileting, grooming, dressing, etc )  - Assess/evaluate cause of self-care deficits   - Assess range of motion  - Assess patient's mobility; develop plan if impaired  - Assess patient's need for assistive devices and provide as appropriate  - Encourage maximum independence but intervene and supervise when necessary  - Involve family in performance of ADLs  - Assess for home care needs following discharge   - Consider OT consult to assist with ADL evaluation and planning for discharge  - Provide patient education as appropriate  Outcome: Progressing  Goal: Maintains/Returns to pre admission functional level  Description: INTERVENTIONS:  - Perform BMAT or MOVE assessment daily    - Set and communicate daily mobility goal to care team and patient/family/caregiver  - Collaborate with rehabilitation services on mobility goals if consulted  - Perform Range of Motion  times a day  - Reposition patient every  hours    - Dangle patient  times a day  - Stand patient  times a day  - Ambulate patient  times a day  - Out of bed to chair  times a day   - Out of bed for meals  times a day  - Out of bed for toileting  - Record patient progress and toleration of activity level   Outcome: Progressing     Problem: PAIN - ADULT  Goal: Verbalizes/displays adequate comfort level or baseline comfort level  Description: Interventions:  - Encourage patient to monitor pain and request assistance  - Assess pain using appropriate pain scale  - Administer analgesics based on type and severity of pain and evaluate response  - Implement non-pharmacological measures as appropriate and evaluate response  - Consider cultural and social influences on pain and pain management  - Notify physician/advanced practitioner if interventions unsuccessful or patient reports new pain  Outcome: Progressing     Problem: SAFETY ADULT  Goal: Patient will remain free of falls  Description: INTERVENTIONS:  - Educate patient/family on patient safety including physical limitations  - Instruct patient to call for assistance with activity   - Consult OT/PT to assist with strengthening/mobility   - Keep Call bell within reach  - Keep bed low and locked with side rails adjusted as appropriate  - Keep care items and personal belongings within reach  - Initiate and maintain comfort rounds  - Make Fall Risk Sign visible to staff  - Offer Toileting every  Hours, in advance of need  - Initiate/Maintain alarm  - Obtain necessary fall risk management equipment:   - Apply yellow socks and bracelet for high fall risk patients  - Consider moving patient to room near nurses station  Outcome: Progressing     Problem: DISCHARGE PLANNING  Goal: Discharge to home or other facility with appropriate resources  Description: INTERVENTIONS:  - Identify barriers to discharge w/patient and caregiver  - Arrange for needed discharge resources and transportation as appropriate  - Identify discharge learning needs (meds, wound care, etc )  - Arrange for interpretive services to assist at discharge as needed  - Refer to Case Management Department for coordinating discharge planning if the patient needs post-hospital services based on physician/advanced practitioner order or complex needs related to functional status, cognitive ability, or social support system  Outcome: Progressing     Problem: Knowledge Deficit  Goal: Patient/family/caregiver demonstrates understanding of disease process, treatment plan, medications, and discharge instructions  Description: Complete learning assessment and assess knowledge base    Interventions:  - Provide teaching at level of understanding  - Provide teaching via preferred learning methods  Outcome: Progressing     Problem: Prexisting or High Potential for Compromised Skin Integrity  Goal: Skin integrity is maintained or improved  Description: INTERVENTIONS:  - Identify patients at risk for skin breakdown  - Assess and monitor skin integrity  - Assess and monitor nutrition and hydration status  - Monitor labs   - Assess for incontinence   - Turn and reposition patient  - Assist with mobility/ambulation  - Relieve pressure over bony prominences  - Avoid friction and shearing  - Provide appropriate hygiene as needed including keeping skin clean and dry  - Evaluate need for skin moisturizer/barrier cream  - Collaborate with interdisciplinary team   - Patient/family teaching  - Consider wound care consult   Outcome: Progressing     Problem: Potential for Falls  Goal: Patient will remain free of falls  Description: INTERVENTIONS:  - Educate patient/family on patient safety including physical limitations  - Instruct patient to call for assistance with activity   - Consult OT/PT to assist with strengthening/mobility   - Keep Call bell within reach  - Keep bed low and locked with side rails adjusted as appropriate  - Keep care items and personal belongings within reach  - Initiate and maintain comfort rounds  - Make Fall Risk Sign visible to staff  - Offer Toileting every  Hours, in advance of need  - Initiate/Maintain alarm  - Obtain necessary fall risk management equipment  - Apply yellow socks and bracelet for high fall risk patients  - Consider moving patient to room near nurses station  Outcome: Progressing

## 2021-06-23 LAB
ANION GAP SERPL CALCULATED.3IONS-SCNC: 9 MMOL/L (ref 4–13)
BASOPHILS # BLD AUTO: 0.05 THOUSANDS/ΜL (ref 0–0.1)
BASOPHILS NFR BLD AUTO: 0 % (ref 0–1)
BUN SERPL-MCNC: 8 MG/DL (ref 5–25)
CALCIUM SERPL-MCNC: 9.6 MG/DL (ref 8.3–10.1)
CHLORIDE SERPL-SCNC: 103 MMOL/L (ref 100–108)
CO2 SERPL-SCNC: 28 MMOL/L (ref 21–32)
CREAT SERPL-MCNC: 0.51 MG/DL (ref 0.6–1.3)
EOSINOPHIL # BLD AUTO: 0.08 THOUSAND/ΜL (ref 0–0.61)
EOSINOPHIL NFR BLD AUTO: 1 % (ref 0–6)
ERYTHROCYTE [DISTWIDTH] IN BLOOD BY AUTOMATED COUNT: 12.9 % (ref 11.6–15.1)
GFR SERPL CREATININE-BSD FRML MDRD: 107 ML/MIN/1.73SQ M
GLUCOSE SERPL-MCNC: 119 MG/DL (ref 65–140)
HCT VFR BLD AUTO: 35.7 % (ref 34.8–46.1)
HGB BLD-MCNC: 11.8 G/DL (ref 11.5–15.4)
IMM GRANULOCYTES # BLD AUTO: 0.03 THOUSAND/UL (ref 0–0.2)
IMM GRANULOCYTES NFR BLD AUTO: 0 % (ref 0–2)
LYMPHOCYTES # BLD AUTO: 2.78 THOUSANDS/ΜL (ref 0.6–4.47)
LYMPHOCYTES NFR BLD AUTO: 25 % (ref 14–44)
MCH RBC QN AUTO: 32.5 PG (ref 26.8–34.3)
MCHC RBC AUTO-ENTMCNC: 33.1 G/DL (ref 31.4–37.4)
MCV RBC AUTO: 98 FL (ref 82–98)
MONOCYTES # BLD AUTO: 0.99 THOUSAND/ΜL (ref 0.17–1.22)
MONOCYTES NFR BLD AUTO: 9 % (ref 4–12)
NEUTROPHILS # BLD AUTO: 7.27 THOUSANDS/ΜL (ref 1.85–7.62)
NEUTS SEG NFR BLD AUTO: 65 % (ref 43–75)
NRBC BLD AUTO-RTO: 0 /100 WBCS
PLATELET # BLD AUTO: 316 THOUSANDS/UL (ref 149–390)
PMV BLD AUTO: 8.9 FL (ref 8.9–12.7)
POTASSIUM SERPL-SCNC: 3.4 MMOL/L (ref 3.5–5.3)
RBC # BLD AUTO: 3.63 MILLION/UL (ref 3.81–5.12)
SODIUM SERPL-SCNC: 140 MMOL/L (ref 136–145)
WBC # BLD AUTO: 11.2 THOUSAND/UL (ref 4.31–10.16)

## 2021-06-23 PROCEDURE — 80048 BASIC METABOLIC PNL TOTAL CA: CPT | Performed by: PHYSICIAN ASSISTANT

## 2021-06-23 PROCEDURE — 99024 POSTOP FOLLOW-UP VISIT: CPT | Performed by: PHYSICIAN ASSISTANT

## 2021-06-23 PROCEDURE — 85025 COMPLETE CBC W/AUTO DIFF WBC: CPT | Performed by: PHYSICIAN ASSISTANT

## 2021-06-23 RX ORDER — VERAPAMIL HYDROCHLORIDE 120 MG/1
360 TABLET, FILM COATED ORAL DAILY
Status: DISCONTINUED | OUTPATIENT
Start: 2021-06-23 | End: 2021-06-26

## 2021-06-23 RX ORDER — ACETAMINOPHEN 325 MG/1
650 TABLET ORAL EVERY 6 HOURS SCHEDULED
Status: DISCONTINUED | OUTPATIENT
Start: 2021-06-23 | End: 2021-06-28 | Stop reason: HOSPADM

## 2021-06-23 RX ADMIN — OXYCODONE HYDROCHLORIDE 10 MG: 10 TABLET ORAL at 00:21

## 2021-06-23 RX ADMIN — OXYCODONE HYDROCHLORIDE 10 MG: 10 TABLET ORAL at 09:04

## 2021-06-23 RX ADMIN — ZOLPIDEM TARTRATE 10 MG: 5 TABLET, COATED ORAL at 21:16

## 2021-06-23 RX ADMIN — NICOTINE 21 MG: 21 PATCH, EXTENDED RELEASE TRANSDERMAL at 08:40

## 2021-06-23 RX ADMIN — ACETAMINOPHEN 650 MG: 325 TABLET ORAL at 14:06

## 2021-06-23 RX ADMIN — CARISOPRODOL 350 MG: 350 TABLET ORAL at 18:01

## 2021-06-23 RX ADMIN — DOCUSATE SODIUM 100 MG: 100 CAPSULE, LIQUID FILLED ORAL at 08:33

## 2021-06-23 RX ADMIN — DIAZEPAM 5 MG: 5 TABLET ORAL at 21:16

## 2021-06-23 RX ADMIN — SODIUM CHLORIDE, SODIUM LACTATE, POTASSIUM CHLORIDE, AND CALCIUM CHLORIDE 100 ML/HR: .6; .31; .03; .02 INJECTION, SOLUTION INTRAVENOUS at 06:14

## 2021-06-23 RX ADMIN — ENOXAPARIN SODIUM 30 MG: 30 INJECTION SUBCUTANEOUS at 02:19

## 2021-06-23 RX ADMIN — CARISOPRODOL 350 MG: 350 TABLET ORAL at 12:19

## 2021-06-23 RX ADMIN — DULOXETINE HYDROCHLORIDE 60 MG: 30 CAPSULE, DELAYED RELEASE ORAL at 08:33

## 2021-06-23 RX ADMIN — OXYCODONE HYDROCHLORIDE 10 MG: 10 TABLET ORAL at 14:52

## 2021-06-23 RX ADMIN — CARISOPRODOL 350 MG: 350 TABLET ORAL at 21:16

## 2021-06-23 RX ADMIN — ACETAMINOPHEN 650 MG: 325 TABLET ORAL at 08:31

## 2021-06-23 RX ADMIN — CARISOPRODOL 350 MG: 350 TABLET ORAL at 08:34

## 2021-06-23 RX ADMIN — HYDROMORPHONE HYDROCHLORIDE 0.5 MG: 1 INJECTION, SOLUTION INTRAMUSCULAR; INTRAVENOUS; SUBCUTANEOUS at 12:13

## 2021-06-23 RX ADMIN — DOCUSATE SODIUM 100 MG: 100 CAPSULE, LIQUID FILLED ORAL at 18:01

## 2021-06-23 RX ADMIN — HYDROMORPHONE HYDROCHLORIDE 0.5 MG: 1 INJECTION, SOLUTION INTRAMUSCULAR; INTRAVENOUS; SUBCUTANEOUS at 18:12

## 2021-06-23 RX ADMIN — VERAPAMIL HYDROCHLORIDE 360 MG: 120 TABLET ORAL at 09:05

## 2021-06-23 RX ADMIN — OXYCODONE HYDROCHLORIDE 10 MG: 10 TABLET ORAL at 19:28

## 2021-06-23 RX ADMIN — ASPIRIN 162 MG: 81 TABLET, COATED ORAL at 08:34

## 2021-06-23 RX ADMIN — OXYCODONE HYDROCHLORIDE 10 MG: 10 TABLET ORAL at 04:43

## 2021-06-23 RX ADMIN — ENOXAPARIN SODIUM 30 MG: 30 INJECTION SUBCUTANEOUS at 13:53

## 2021-06-23 NOTE — PROGRESS NOTES
Progress Note - Orthopedics   Cristy Burgos 60 y o  female MRN: [id number]  Unit/Bed#: -01      Subjective:    60 y  o female POD#2 ORIF right bimalleolar ankle fracture  Patient states that she continues to experience significant pain that is sharp and shooting in her ankle  She continues to elevate the extremity  Patient denies any calf pain or thigh pain  Patient states she has been compliant with anticoagulation therapy  Patient states she has been compliant with nonweightbearing restrictions of the right lower extremity  Patient denies any fevers any chills  Patient denies any shortness of breath chest tightness chest pain  Patient denies any nausea vomiting diarrhea  Patient denies any numbness or tingling in the foot  Patient offers no other complaints at this time        Labs:  0   Lab Value Date/Time    HCT 35 7 06/23/2021 0431    HCT 32 6 (L) 06/22/2021 0506    HCT 38 0 06/21/2021 1034    HGB 11 8 06/23/2021 0431    HGB 10 8 (L) 06/22/2021 0506    HGB 12 6 06/21/2021 1034    WBC 11 20 (H) 06/23/2021 0431    WBC 14 31 (H) 06/22/2021 0506    WBC 10 87 (H) 06/21/2021 1034       Meds:    Current Facility-Administered Medications:     acetaminophen (TYLENOL) tablet 650 mg, 650 mg, Oral, Q6H [address], Jono Anne PA-C    aluminum-magnesium hydroxide-simethicone (MYLANTA) oral suspension 30 mL, 30 mL, Oral, Q6H PRN, Juan Carlos Saunders PA-C    aspirin (ECOTRIN LOW STRENGTH) EC tablet 162 mg, 162 mg, Oral, Daily, Juan Carlos Saunders PA-C, 162 mg at 06/22/21 7677    carisoprodol (SOMA) tablet 350 mg, 350 mg, Oral, 4x Daily, Juan Carlos Saunders PA-C, 350 mg at 06/22/21 2128    diazepam (VALIUM) tablet 5 mg, 5 mg, Oral, HS, Lasha Barnes PA-C, 5 mg at 06/22/21 2129    docusate sodium (COLACE) capsule 100 mg, 100 mg, Oral, BID, Juan Carlos Saunders PA-C, 100 mg at 06/22/21 1725    DULoxetine (CYMBALTA) delayed release capsule 60 mg, 60 mg, Oral, Daily, Lasha Barnes PA-C, 60 mg at 06/22/21 0822    enoxaparin (LOVENOX) subcutaneous injection 30 mg, 30 mg, Subcutaneous, Q12H [address], Lasha Barnes PA-C, 30 mg at 06/23/21 0219    HYDROmorphone (DILAUDID) injection 0 5 mg, 0 5 mg, Intravenous, Q4H PRN, Xochilt Creameleni, PA-C, 0 5 mg at 06/22/21 2000    lactated ringers infusion, 100 mL/hr, Intravenous, Continuous, Xochilt Creameleni, PA-C, Last Rate: 100 mL/hr at 06/23/21 0614, 100 mL/hr at 06/23/21 0614    nicotine (NICODERM CQ) 21 mg/24 hr TD 24 hr patch 21 mg, 21 mg, Transdermal, Daily, [place] Creameleni, PA-C, 21 mg at 06/22/21 2851    ondansetron (ZOFRAN) injection 4 mg, 4 mg, Intravenous, Q6H PRN, Xochilt Creameleni, PA-C    oxyCODONE (ROXICODONE) immediate release tablet 10 mg, 10 mg, Oral, Q4H PRN, [place] Creamer, PA-C, 10 mg at 06/23/21 0443    oxyCODONE (ROXICODONE) IR tablet 5 mg, 5 mg, Oral, Q4H PRN, Xochilt Creamer, PA-C, 5 mg at 06/21/21 2341    zolpidem (AMBIEN) tablet 10 mg, 10 mg, Oral, HS, Lasha Barnes, PA-C, 10 mg at 06/22/21 2128    Blood Culture:   No results found for: BLOODCX    Wound Culture:   No results found for: WOUNDCULT    Ins and Outs:  I/O last 24 hours: In: 46 [P O :1580; I V :2000]  Out: 4400 [Urine:4400]          Physical:  Vitals:    06/23/21 0709   BP: (!) 182/100   Pulse: 100   Resp: 18   Temp: 99 3 °F (37 4 °C)   SpO2: 92%     Musculoskeletal: right Lower Extremity  · Skin  :  No acute visible abnormalities present in the right lower extremity  Extremity appears well-perfused overall  · Dressing  :  Dressing is clean dry and intact with no visible soilage   · TTP   :   No bony or soft tissue tenderness palpation noted at this time  · +fhl/ehl,  2+ DP pulse      Assessment:    60 y  o female POD#2 ORIF right bimalleolar ankle fracture        Plan:  · Nonweightbearing right lower extremity  · PT/OT for ambulation assistance, gait training   · Pain control as per primary team   Tylenol was added as scheduled to help with pain control  · DVT ppx as directed  · Dispo:  Ortho will continue to follow this time  Nonweightbearing right lower extremity  Elevate extremity as directed  Ice as needed for pain  Tylenol was added to her pain regimen as scheduled to help with pain control  Patient was advised that she needs to be off IV pain medication prior to discharge  Orthopedics to follow    [facility]   Ruby Schroeder PA-C

## 2021-06-24 LAB
ANION GAP SERPL CALCULATED.3IONS-SCNC: 8 MMOL/L (ref 4–13)
BASOPHILS # BLD AUTO: 0.04 THOUSANDS/ΜL (ref 0–0.1)
BASOPHILS NFR BLD AUTO: 1 % (ref 0–1)
BUN SERPL-MCNC: 12 MG/DL (ref 5–25)
CALCIUM SERPL-MCNC: 9 MG/DL (ref 8.3–10.1)
CHLORIDE SERPL-SCNC: 101 MMOL/L (ref 100–108)
CO2 SERPL-SCNC: 31 MMOL/L (ref 21–32)
CREAT SERPL-MCNC: 0.72 MG/DL (ref 0.6–1.3)
EOSINOPHIL # BLD AUTO: 0.17 THOUSAND/ΜL (ref 0–0.61)
EOSINOPHIL NFR BLD AUTO: 2 % (ref 0–6)
ERYTHROCYTE [DISTWIDTH] IN BLOOD BY AUTOMATED COUNT: 12.8 % (ref 11.6–15.1)
GFR SERPL CREATININE-BSD FRML MDRD: 93 ML/MIN/1.73SQ M
GLUCOSE SERPL-MCNC: 119 MG/DL (ref 65–140)
HCT VFR BLD AUTO: 36.1 % (ref 34.8–46.1)
HGB BLD-MCNC: 11.7 G/DL (ref 11.5–15.4)
IMM GRANULOCYTES # BLD AUTO: 0.04 THOUSAND/UL (ref 0–0.2)
IMM GRANULOCYTES NFR BLD AUTO: 1 % (ref 0–2)
LYMPHOCYTES # BLD AUTO: 2.79 THOUSANDS/ΜL (ref 0.6–4.47)
LYMPHOCYTES NFR BLD AUTO: 32 % (ref 14–44)
MCH RBC QN AUTO: 32.1 PG (ref 26.8–34.3)
MCHC RBC AUTO-ENTMCNC: 32.4 G/DL (ref 31.4–37.4)
MCV RBC AUTO: 99 FL (ref 82–98)
MONOCYTES # BLD AUTO: 0.76 THOUSAND/ΜL (ref 0.17–1.22)
MONOCYTES NFR BLD AUTO: 9 % (ref 4–12)
NEUTROPHILS # BLD AUTO: 4.98 THOUSANDS/ΜL (ref 1.85–7.62)
NEUTS SEG NFR BLD AUTO: 55 % (ref 43–75)
NRBC BLD AUTO-RTO: 0 /100 WBCS
PLATELET # BLD AUTO: 312 THOUSANDS/UL (ref 149–390)
PMV BLD AUTO: 9 FL (ref 8.9–12.7)
POTASSIUM SERPL-SCNC: 3.4 MMOL/L (ref 3.5–5.3)
RBC # BLD AUTO: 3.64 MILLION/UL (ref 3.81–5.12)
SODIUM SERPL-SCNC: 140 MMOL/L (ref 136–145)
WBC # BLD AUTO: 8.78 THOUSAND/UL (ref 4.31–10.16)

## 2021-06-24 PROCEDURE — 80048 BASIC METABOLIC PNL TOTAL CA: CPT | Performed by: PHYSICIAN ASSISTANT

## 2021-06-24 PROCEDURE — 99024 POSTOP FOLLOW-UP VISIT: CPT | Performed by: PHYSICIAN ASSISTANT

## 2021-06-24 PROCEDURE — 85025 COMPLETE CBC W/AUTO DIFF WBC: CPT | Performed by: PHYSICIAN ASSISTANT

## 2021-06-24 RX ORDER — METHOCARBAMOL 500 MG/1
500 TABLET, FILM COATED ORAL EVERY 6 HOURS PRN
Status: DISCONTINUED | OUTPATIENT
Start: 2021-06-24 | End: 2021-06-28 | Stop reason: HOSPADM

## 2021-06-24 RX ADMIN — ACETAMINOPHEN 650 MG: 325 TABLET ORAL at 00:31

## 2021-06-24 RX ADMIN — DOCUSATE SODIUM 100 MG: 100 CAPSULE, LIQUID FILLED ORAL at 08:31

## 2021-06-24 RX ADMIN — DOCUSATE SODIUM 100 MG: 100 CAPSULE, LIQUID FILLED ORAL at 17:14

## 2021-06-24 RX ADMIN — OXYCODONE HYDROCHLORIDE 10 MG: 10 TABLET ORAL at 17:14

## 2021-06-24 RX ADMIN — OXYCODONE HYDROCHLORIDE 10 MG: 10 TABLET ORAL at 12:26

## 2021-06-24 RX ADMIN — OXYCODONE HYDROCHLORIDE 10 MG: 10 TABLET ORAL at 21:14

## 2021-06-24 RX ADMIN — ENOXAPARIN SODIUM 30 MG: 30 INJECTION SUBCUTANEOUS at 00:31

## 2021-06-24 RX ADMIN — CARISOPRODOL 350 MG: 350 TABLET ORAL at 08:31

## 2021-06-24 RX ADMIN — OXYCODONE HYDROCHLORIDE 10 MG: 10 TABLET ORAL at 08:30

## 2021-06-24 RX ADMIN — METHOCARBAMOL 500 MG: 500 TABLET ORAL at 20:36

## 2021-06-24 RX ADMIN — CARISOPRODOL 350 MG: 350 TABLET ORAL at 22:02

## 2021-06-24 RX ADMIN — HYDROMORPHONE HYDROCHLORIDE 0.5 MG: 1 INJECTION, SOLUTION INTRAMUSCULAR; INTRAVENOUS; SUBCUTANEOUS at 06:18

## 2021-06-24 RX ADMIN — OXYCODONE HYDROCHLORIDE 10 MG: 10 TABLET ORAL at 02:38

## 2021-06-24 RX ADMIN — VERAPAMIL HYDROCHLORIDE 360 MG: 120 TABLET ORAL at 08:31

## 2021-06-24 RX ADMIN — ACETAMINOPHEN 650 MG: 325 TABLET ORAL at 12:26

## 2021-06-24 RX ADMIN — ZOLPIDEM TARTRATE 10 MG: 5 TABLET, COATED ORAL at 21:57

## 2021-06-24 RX ADMIN — ACETAMINOPHEN 650 MG: 325 TABLET ORAL at 17:14

## 2021-06-24 RX ADMIN — ACETAMINOPHEN 650 MG: 325 TABLET ORAL at 05:20

## 2021-06-24 RX ADMIN — NICOTINE 21 MG: 21 PATCH, EXTENDED RELEASE TRANSDERMAL at 08:31

## 2021-06-24 RX ADMIN — DIAZEPAM 5 MG: 5 TABLET ORAL at 21:57

## 2021-06-24 RX ADMIN — CARISOPRODOL 350 MG: 350 TABLET ORAL at 17:14

## 2021-06-24 RX ADMIN — ASPIRIN 162 MG: 81 TABLET, COATED ORAL at 08:30

## 2021-06-24 RX ADMIN — CARISOPRODOL 350 MG: 350 TABLET ORAL at 12:26

## 2021-06-24 RX ADMIN — DULOXETINE HYDROCHLORIDE 60 MG: 30 CAPSULE, DELAYED RELEASE ORAL at 08:31

## 2021-06-24 RX ADMIN — ENOXAPARIN SODIUM 30 MG: 30 INJECTION SUBCUTANEOUS at 17:18

## 2021-06-24 NOTE — PLAN OF CARE
Problem: MOBILITY - ADULT  Goal: Maintain or return to baseline ADL function  Description: INTERVENTIONS:  -  Assess patient's ability to carry out ADLs; assess patient's baseline for ADL function and identify physical deficits which impact ability to perform ADLs (bathing, care of mouth/teeth, toileting, grooming, dressing, etc )  - Assess/evaluate cause of self-care deficits   - Assess range of motion  - Assess patient's mobility; develop plan if impaired  - Assess patient's need for assistive devices and provide as appropriate  - Encourage maximum independence but intervene and supervise when necessary  - Involve family in performance of ADLs  - Assess for home care needs following discharge   - Consider OT consult to assist with ADL evaluation and planning for discharge  - Provide patient education as appropriate  Outcome: Progressing  Goal: Maintains/Returns to pre admission functional level  Description: INTERVENTIONS:  - Perform BMAT or MOVE assessment daily    - Set and communicate daily mobility goal to care team and patient/family/caregiver     - Collaborate with rehabilitation services on mobility goals if consulted  - Out of bed for toileting  - Record patient progress and toleration of activity level   Outcome: Progressing     Problem: PAIN - ADULT  Goal: Verbalizes/displays adequate comfort level or baseline comfort level  Description: Interventions:  - Encourage patient to monitor pain and request assistance  - Assess pain using appropriate pain scale  - Administer analgesics based on type and severity of pain and evaluate response  - Implement non-pharmacological measures as appropriate and evaluate response  - Consider cultural and social influences on pain and pain management  - Notify physician/advanced practitioner if interventions unsuccessful or patient reports new pain  Outcome: Progressing     Problem: SAFETY ADULT  Goal: Patient will remain free of falls  Description: INTERVENTIONS:  - Educate patient/family on patient safety including physical limitations  - Instruct patient to call for assistance with activity   - Consult OT/PT to assist with strengthening/mobility   - Keep Call bell within reach  - Keep bed low and locked with side rails adjusted as appropriate  - Keep care items and personal belongings within reach  - Initiate and maintain comfort rounds  - Make Fall Risk Sign visible to staff  - Apply yellow socks and bracelet for high fall risk patients  - Consider moving patient to room near nurses station  Outcome: Progressing     Problem: DISCHARGE PLANNING  Goal: Discharge to home or other facility with appropriate resources  Description: INTERVENTIONS:  - Identify barriers to discharge w/patient and caregiver  - Arrange for needed discharge resources and transportation as appropriate  - Identify discharge learning needs (meds, wound care, etc )  - Arrange for interpretive services to assist at discharge as needed  - Refer to Case Management Department for coordinating discharge planning if the patient needs post-hospital services based on physician/advanced practitioner order or complex needs related to functional status, cognitive ability, or social support system  Outcome: Progressing     Problem: Knowledge Deficit  Goal: Patient/family/caregiver demonstrates understanding of disease process, treatment plan, medications, and discharge instructions  Description: Complete learning assessment and assess knowledge base    Interventions:  - Provide teaching at level of understanding  - Provide teaching via preferred learning methods  Outcome: Progressing     Problem: Prexisting or High Potential for Compromised Skin Integrity  Goal: Skin integrity is maintained or improved  Description: INTERVENTIONS:  - Identify patients at risk for skin breakdown  - Assess and monitor skin integrity  - Assess and monitor nutrition and hydration status  - Monitor labs   - Assess for incontinence   - Turn and reposition patient  - Assist with mobility/ambulation  - Relieve pressure over bony prominences  - Avoid friction and shearing  - Provide appropriate hygiene as needed including keeping skin clean and dry  - Evaluate need for skin moisturizer/barrier cream  - Collaborate with interdisciplinary team   - Patient/family teaching  - Consider wound care consult   Outcome: Progressing     Problem: Potential for Falls  Goal: Patient will remain free of falls  Description: INTERVENTIONS:  - Educate patient/family on patient safety including physical limitations  - Instruct patient to call for assistance with activity   - Consult OT/PT to assist with strengthening/mobility   - Keep Call bell within reach  - Keep bed low and locked with side rails adjusted as appropriate  - Keep care items and personal belongings within reach  - Initiate and maintain comfort rounds  - Make Fall Risk Sign visible to staff  - Apply yellow socks and bracelet for high fall risk patients  - Consider moving patient to room near nurses station  Outcome: Progressing

## 2021-06-24 NOTE — PROGRESS NOTES
Progress Note - Orthopedics   Raquel Ayers 60 y o  female MRN: [id number]  Unit/Bed#: -01      Subjective:    60 y  o female POD#3 s/p ORIF right bimalleolar ankle fracture, resting comfortably in bed, in no acute distress  Patient reports pain is controlled at this time and has been elevating right lower extremity on a few blankets with ice applied  The patient concerned about returning pain when discharge from hospital   She also reports she has not yet worked with PT/OT for ambulation training with walker for nonweightbearing status right lower extremity  She reports mild tingling sensation in toes, but has been performing range of motion exercises admits to decreased stiffness and tingling sensation  Overall, patient doing well and resting comfortably  Patient denies chest pain, shortness of breath, fevers, chills, nausea, vomiting, dizziness      Labs:  0   Lab Value Date/Time    HCT 36 1 06/24/2021 0452    HCT 35 7 06/23/2021 0431    HCT 32 6 (L) 06/22/2021 0506    HGB 11 7 06/24/2021 0452    HGB 11 8 06/23/2021 0431    HGB 10 8 (L) 06/22/2021 0506    WBC 8 78 06/24/2021 0452    WBC 11 20 (H) 06/23/2021 0431    WBC 14 31 (H) 06/22/2021 0506       Meds:    Current Facility-Administered Medications:     acetaminophen (TYLENOL) tablet 650 mg, 650 mg, Oral, Q6H [address], Bruce Perkins PA-C, 650 mg at 06/24/21 1226    aluminum-magnesium hydroxide-simethicone (MYLANTA) oral suspension 30 mL, 30 mL, Oral, Q6H PRN, Garfield Boyer PA-C    aspirin (ECOTRIN LOW STRENGTH) EC tablet 162 mg, 162 mg, Oral, Daily, Garfield Boyer PA-C, 162 mg at 06/24/21 0830    carisoprodol (SOMA) tablet 350 mg, 350 mg, Oral, 4x Daily, Lasha Barnes PA-C, 350 mg at 06/24/21 1226    diazepam (VALIUM) tablet 5 mg, 5 mg, Oral, HS, Lasha Barnes PA-C, 5 mg at 06/23/21 2116    docusate sodium (COLACE) capsule 100 mg, 100 mg, Oral, BID, Lasha Barnes PA-C, 100 mg at 06/24/21 0831    DULoxetine (Niranjan Cardona) delayed release capsule 60 mg, 60 mg, Oral, Daily, Lasha Barnes PA-C, 60 mg at 06/24/21 0831    enoxaparin (LOVENOX) subcutaneous injection 30 mg, 30 mg, Subcutaneous, Q12H [facility] & half-way, Lasha Barnes PA-C, 30 mg at 06/24/21 0031    HYDROmorphone (DILAUDID) injection 0 5 mg, 0 5 mg, Intravenous, Q4H PRN, Delmy Oneill PA-C, 0 5 mg at 06/24/21 0860    lactated ringers infusion, 100 mL/hr, Intravenous, Continuous, Delmy Oneill PA-C, Stopped at 06/23/21 0840    nicotine (NICODERM CQ) 21 mg/24 hr TD 24 hr patch 21 mg, 21 mg, Transdermal, Daily, Lasha Barnes PA-C, 21 mg at 06/24/21 0831    ondansetron (ZOFRAN) injection 4 mg, 4 mg, Intravenous, Q6H PRN, Delmy Oneill PA-C    oxyCODONE (ROXICODONE) immediate release tablet 10 mg, 10 mg, Oral, Q4H PRN, Delmy Oneill PA-C, 10 mg at 06/24/21 1226    oxyCODONE (ROXICODONE) IR tablet 5 mg, 5 mg, Oral, Q4H PRN, Delmy Oneill PA-C, 5 mg at 06/21/21 2341    verapamil (CALAN) tablet 360 mg, 360 mg, Oral, Daily, Belinda Christy PA-C, 360 mg at 06/24/21 0831    zolpidem (AMBIEN) tablet 10 mg, 10 mg, Oral, HS, Lasha Barnes PA-C, 10 mg at 06/23/21 2116    Blood Culture:   No results found for: BLOODCX    Wound Culture:   No results found for: WOUNDCULT    Ins and Outs:  I/O last 24 hours: In: 1100 [P O :1100]  Out: 1200 [Urine:1200]          Physical:  Vitals:    06/24/21 0736   BP: 113/61   Pulse: 76   Resp: 16   Temp: 98 2 °F (36 8 °C)   SpO2: 93%     Musculoskeletal:   Right lower extremity  · Splint clean, dry, and intact without evidence of serosanguineous strike through  · Patient is able to flex and extend all toes  No pain out of proportion with passive range of motion of toes  Patient is able to form straight leg raise  · +2 dorsalis pedis pulse palpated, sensation intact to dorsum of foot, dorsal and plantar aspects of toes    Assessment:    60 y  o female POD#3 s/p ORIF right bimalleolar ankle fracture    Plan:  · Nonweightbearing right lower extremity, splint intact at all times  Avoid exposing splint to moisture secondary to surgical incisions  · PT/OT up and out of bed for gait training while maintaining nonweightbearing status right lower extremity  · Pain control  · DVT ppx-Lovenox  · Dispo:  Overall, patient with improvement postoperatively  Will discontinue/wean from IV Dilaudid and continue with oral pain medications for possible discharge today  Advised patient to work with PT/OT for gait training with use of walker and maintain nonweightbearing status right lower extremity  Continue to ice and elevate right lower extremity to prevent swelling      Primitivo Rinaldi PA-C

## 2021-06-25 PROCEDURE — 97163 PT EVAL HIGH COMPLEX 45 MIN: CPT

## 2021-06-25 PROCEDURE — 99024 POSTOP FOLLOW-UP VISIT: CPT | Performed by: PHYSICIAN ASSISTANT

## 2021-06-25 PROCEDURE — 97166 OT EVAL MOD COMPLEX 45 MIN: CPT

## 2021-06-25 RX ADMIN — ACETAMINOPHEN 650 MG: 325 TABLET ORAL at 12:11

## 2021-06-25 RX ADMIN — DOCUSATE SODIUM 100 MG: 100 CAPSULE, LIQUID FILLED ORAL at 09:17

## 2021-06-25 RX ADMIN — ASPIRIN 162 MG: 81 TABLET, COATED ORAL at 09:16

## 2021-06-25 RX ADMIN — OXYCODONE HYDROCHLORIDE 10 MG: 10 TABLET ORAL at 18:07

## 2021-06-25 RX ADMIN — ENOXAPARIN SODIUM 30 MG: 30 INJECTION SUBCUTANEOUS at 14:45

## 2021-06-25 RX ADMIN — VERAPAMIL HYDROCHLORIDE 360 MG: 120 TABLET ORAL at 09:16

## 2021-06-25 RX ADMIN — OXYCODONE HYDROCHLORIDE 10 MG: 10 TABLET ORAL at 06:44

## 2021-06-25 RX ADMIN — ZOLPIDEM TARTRATE 10 MG: 5 TABLET, COATED ORAL at 21:53

## 2021-06-25 RX ADMIN — ENOXAPARIN SODIUM 30 MG: 30 INJECTION SUBCUTANEOUS at 00:33

## 2021-06-25 RX ADMIN — CARISOPRODOL 350 MG: 350 TABLET ORAL at 18:00

## 2021-06-25 RX ADMIN — CARISOPRODOL 350 MG: 350 TABLET ORAL at 12:11

## 2021-06-25 RX ADMIN — ACETAMINOPHEN 650 MG: 325 TABLET ORAL at 17:59

## 2021-06-25 RX ADMIN — ACETAMINOPHEN 650 MG: 325 TABLET ORAL at 00:32

## 2021-06-25 RX ADMIN — OXYCODONE HYDROCHLORIDE 10 MG: 10 TABLET ORAL at 01:14

## 2021-06-25 RX ADMIN — NICOTINE 21 MG: 21 PATCH, EXTENDED RELEASE TRANSDERMAL at 09:17

## 2021-06-25 RX ADMIN — OXYCODONE HYDROCHLORIDE 10 MG: 10 TABLET ORAL at 22:12

## 2021-06-25 RX ADMIN — CARISOPRODOL 350 MG: 350 TABLET ORAL at 09:16

## 2021-06-25 RX ADMIN — DULOXETINE HYDROCHLORIDE 60 MG: 30 CAPSULE, DELAYED RELEASE ORAL at 09:16

## 2021-06-25 RX ADMIN — DOCUSATE SODIUM 100 MG: 100 CAPSULE, LIQUID FILLED ORAL at 18:00

## 2021-06-25 RX ADMIN — ACETAMINOPHEN 650 MG: 325 TABLET ORAL at 05:09

## 2021-06-25 RX ADMIN — OXYCODONE HYDROCHLORIDE 10 MG: 10 TABLET ORAL at 11:58

## 2021-06-25 RX ADMIN — DIAZEPAM 5 MG: 5 TABLET ORAL at 21:53

## 2021-06-25 RX ADMIN — CARISOPRODOL 350 MG: 350 TABLET ORAL at 21:53

## 2021-06-25 NOTE — PLAN OF CARE
Problem: PHYSICAL THERAPY ADULT  Goal: Performs mobility at highest level of function for planned discharge setting  See evaluation for individualized goals  Description: Treatment/Interventions: Functional transfer training, LE strengthening/ROM, Therapeutic exercise, Endurance training, Patient/family training, Equipment eval/education, Bed mobility, Gait training, Spoke to nursing, OT, Continued evaluation          See flowsheet documentation for full assessment, interventions and recommendations  Note: Prognosis: Good  Problem List: Decreased strength, Decreased endurance, Impaired balance, Decreased mobility, Orthopedic restrictions  Assessment: Pt is 60 y o  female seen for PT evaluation s/p admit to [facility] on 2021 w/ Smoking  PT consulted to assess pt's functional mobility and d/c needs  Order placed for PT eval and tx, w/ up w/ A and NWB R LE order  Performed at least 2 patient identifiers during session: Name and   Comorbidities affecting pt's physical performance at time of assessment include: Bimalleolar fracture of right ankle, HTN (hypertension), Non Hodgkin's lymphoma  PTA, pt was independent w/ all functional mobility w/ walker (recently since injury), ambulates household distances, has 5 SUSAN, lives w/ family in one level house and unemployed (pt reports ramped entrance available upon discharge; pt reports having access to ramp to be installed prior to discharge from [place] [id number])  Personal factors affecting pt at time of IE include: ambulating w/ assistive device, inability to navigate level surfaces w/o external assistance, positive fall history, inability to perform IADLs and inability to perform ADLs   Please find objective findings from PT assessment regarding body systems outlined above with impairments and limitations including weakness, impaired balance, decreased endurance, gait deviations, decreased activity tolerance, decreased functional mobility tolerance, fall risk and orthopedic restrictions  The following objective measures performed on IE also reveal limitations: Barthel Index: 55/100 and Modified Fred: 4 (moderate/severe disability)  Pt's clinical presentation is currently unstable/unpredictable seen in pt's presentation of ongoing medical management/monitoring and need for input for mobility technique/safety  Pt to benefit from continued PT tx to address deficits as defined above and maximize level of functional independent mobility and consistency  From PT/mobility standpoint, recommendation at time of d/c would be home with home health rehabilitation pending progress in order to facilitate return to PLOF  Barriers to Discharge: None  Barriers to Discharge Comments: pt reports ramped entrance available upon discharge; pt reports having access to ramp to be installed prior to discharge from [facility] (pt with increased family support upon discharge)     PT Discharge Recommendation: Home with home health rehabilitation     PT - OK to Discharge: Yes (when medically cleared)    See flowsheet documentation for full assessment

## 2021-06-25 NOTE — PLAN OF CARE
Problem: OCCUPATIONAL THERAPY ADULT  Goal: Performs self-care activities at highest level of function for planned discharge setting  See evaluation for individualized goals  Description: Treatment Interventions: ADL retraining, Functional transfer training, UE strengthening/ROM, Endurance training, Patient/family training, Equipment evaluation/education, Compensatory technique education, Activityengagement          See flowsheet documentation for full assessment, interventions and recommendations  Note: Limitation: Decreased endurance, Decreased self-care trans, Decreased high-level ADLs  Prognosis: Good  Assessment: Patient is a 60 y o  female seen for OT evaluation s/p admit to [id number] [facility] on 6/21/2021 w/Smoking  Commorbidities affecting patient's functional performance at time of assessment include: bimalleolar fracture of R ankle, HTN, and smoking  Patient  has a past medical history of Cancer [facility]), Chronic back pain, Hypertension, Migraine, and Non Hodgkin's lymphoma ([facility] [address] )  Orders placed for OT evaluation and treatment NWB RLE per orthopedics  Performed at least two patient identifiers during session including name and wristband  Prior to admission, patient was living with her spouse and family in a one-story mobile home with 5 SUSAN  At baseline, patient reports that she is independent in both ADLs and IADLs  Patient reports that she will be able to have ramp installed upon discharge  Personal factors affecting patient at time of initial evaluation include: difficulty performing ADLs and difficulty performing IADLs  Upon evaluation, patient requires supervision and set up assist for UB ADLs, minimal  assist for LB ADLs, transfers and functional ambulation in room and bathroom with minimal  assist, with the use of Rolling Walker  Patient is alert and oriented x 4   Occupational performance is affected by the following deficits: dynamic sit/ stand balance deficit with poor standing tolerance time for self care and functional mobility, decreased activity tolerance, orthopedic restrictions and postural control and postural alignment deficit, requiring external assistance to complete transitional movements  Therapist completed expanded review of medical records and additional review of physical, cognitive or psychosocial history, clinical examination identifying 3-5 performance deficits, clinical decision making of a moderate complexity, consistent with moderate complexity level evaluation  Patient to benefit from continued Occupational Therapy treatment while in the hospital to address deficits as defined above and maximize level of functional independence with ADLs and functional mobility  Occupational Performance areas to address include: bathing/ shower, dressing, toilet hygiene, transfer to all surfaces, functional mobility, IADLs: safety procedures and Leisure Participation  From OT standpoint, recommendation at time of d/c would be Home with family support and Home OT         OT Discharge Recommendation: Home with home health rehabilitation  OT - OK to Discharge: Yes (pending medical clearance)

## 2021-06-25 NOTE — OCCUPATIONAL THERAPY NOTE
Occupational Therapy Evaluation Note        Patient Name: Ben Luque  JOSDJ'W Date: 6/25/2021 06/25/21 0930   OT Last Visit   OT Visit Date 06/25/21   Note Type   Note type Evaluation   Restrictions/Precautions   Weight Bearing Precautions Per Order Yes   RLE Weight Bearing Per Order NWB  (per orthopedics)   Braces or Orthoses Other (Comment)  (RLE short leg splint application by ortho)   Other Precautions Chair Alarm; Bed Alarm;WBS;Fall Risk;Multiple lines   Pain Assessment   Pain Assessment Tool 0-10   Pain Score No Pain   Home Living   Type of Home Mobile home   Home Layout One level;Performs ADLs on one level;Stairs to enter with rails  (5 SUSAN)   Bathroom Shower/Tub Tub/shower unit   Bathroom Toilet Standard   Bathroom Equipment Hand-held shower   [facility] Walker;Crutches  (pt ambulating with walker prior to admission)   Additional Comments pt reports ramped entrance available upon discharge; pt reports having access to ramp to be installed prior to discharge from [facility]   Prior Function   Level of [place] Independent with ADLs and functional mobility   Lives With Spouse; Family  (Granddaughter; dtr lives close by)   Eze Awanvianey Fernandez 30 in the last 6 months 1 to 4   Vocational Unemployed   Comments pt drives   Lifestyle   Autonomy Per patient report, she lives with her spouse and family in a one-story mobile home with 5 SUSAN  At baseline, patient reports that she is independent in both ADLs and IADLs  Prior to admission, patient had been ambulating with a walker since her injury; prior to injury patient was ambulating independently     Reciprocal Relationships Supportive spouse and family   Service to Others Not currently working   Intrinsic Gratification Taking care of cat   Psychosocial   Psychosocial (WDL) WDL   ADL   Eating Assistance 6  Modified independent   Grooming Assistance 6 Modified Independent   UB Bathing Assistance 5  Supervision/Setup    [address] 5  Supervision/Setup   LB [place] Assistance  4  Minimal Assistance   Functional Assistance 4  Minimal Assistance   Additional Comments ADL assist levels based on pt's functional performance during OT evaluation   Bed Mobility   Supine to Sit 5  Supervision   Additional items Assist x 1;HOB elevated; Increased time required;Verbal cues   Additional Comments Patient received lying supine in bed upon OT arrival; at end of session: pt seated OOB to recliner chair w/ all needs within reach and chair alarm activated   Transfers   Sit to Stand 4  Minimal assistance   Additional items Assist x 1; Increased time required;Verbal cues   Stand to Sit 4  Minimal assistance   Additional items Assist x 1; Armrests; Increased time required;Verbal cues   Additional Comments Performed functional transfers with RW  Patient verbalized understanding of NWB RLE restriction, able to maintain NWB throughout entirety of evaluation     Functional Mobility   Functional Mobility 4  Minimal assistance   Additional Comments x1   Additional items Rolling walker   Balance   Static Sitting Good   Dynamic Sitting Fair +   Static Standing Fair   Dynamic Standing Fair -   Ambulatory Poor +   Activity Tolerance   Activity Tolerance Patient tolerated treatment well   Medical Staff Made Aware PT Vikki Jacobson Made Aware LUCHO Mittal made aware of session outcomes   RUE Assessment   RUE Assessment WFL  (AROM and strength WFL based on pt's functional performance)   LUE Assessment   LUE Assessment WFL  (AROM and strength WFL based on pt's functional performance)   Hand Function   Gross Motor Coordination Functional  (Based on pt's functional performance)   Fine Motor Coordination Functional  (Based on pt's functional performance)   Cognition   Overall Cognitive Status [facility] Arousal/Participation Alert; Responsive; Cooperative   Attention Within functional limits   Orientation Level Oriented X4   Memory Within functional limits   Following Commands Follows all commands and directions without difficulty   Comments Patient agreeable to OT evaluation   Assessment   Limitation Decreased endurance;Decreased self-care trans;Decreased high-level ADLs   Prognosis Good   Assessment Patient is a 60 y o  female seen for OT evaluation s/p admit to [id number] [facility] on 6/21/2021 w/Smoking  Commorbidities affecting patient's functional performance at time of assessment include: bimalleolar fracture of R ankle, HTN, and smoking  Patient  has a past medical history of Cancer [facility]), Chronic back pain, Hypertension, Migraine, and Non Hodgkin's lymphoma ([facility] [address] )  Orders placed for OT evaluation and treatment NWB RLE per orthopedics  Performed at least two patient identifiers during session including name and wristband  Prior to admission, patient was living with her spouse and family in a one-story mobile home with 5 SUSAN  At baseline, patient reports that she is independent in both ADLs and IADLs  Patient reports that she will be able to have ramp installed upon discharge  Personal factors affecting patient at time of initial evaluation include: difficulty performing ADLs and difficulty performing IADLs  Upon evaluation, patient requires supervision and set up assist for UB ADLs, minimal  assist for LB ADLs, transfers and functional ambulation in room and bathroom with minimal  assist, with the use of Rolling Walker  Patient is alert and oriented x 4  Occupational performance is affected by the following deficits: dynamic sit/ stand balance deficit with poor standing tolerance time for self care and functional mobility, decreased activity tolerance, orthopedic restrictions and postural control and postural alignment deficit, requiring external assistance to complete transitional movements   Therapist completed expanded review of medical records and additional review of physical, cognitive or psychosocial history, clinical examination identifying 3-5 performance deficits, clinical decision making of a moderate complexity, consistent with moderate complexity level evaluation  Patient to benefit from continued Occupational Therapy treatment while in the hospital to address deficits as defined above and maximize level of functional independence with ADLs and functional mobility  Occupational Performance areas to address include: bathing/ shower, dressing, toilet hygiene, transfer to all surfaces, functional mobility, IADLs: safety procedures and Leisure Participation  From OT standpoint, recommendation at time of d/c would be Home with family support and Home OT  Goals   Patient Goals to return home   Plan   Treatment Interventions ADL retraining;Functional transfer training;UE strengthening/ROM; Endurance training;Patient/family training;Equipment evaluation/education; Compensatory technique education; Activityengagement   Goal Expiration Date 07/02/21   OT Treatment Day 0   OT Frequency 2-3x/wk   Recommendation   OT Discharge Recommendation Home with home health rehabilitation   OT - OK to Discharge Yes  (pending medical clearance)   Additional Comments  The patient's raw score on the AM-PAC Daily Activity inpatient short form is 20, standardized score is 42 03, greater than 39 4  Patients at this level are likely to benefit from discharge to home  Please refer to the recommendation of the Occupational Therapist for safe discharge planning     AM-PAC Daily Activity Inpatient   Lower Body Dressing 3   Bathing 3   Toileting 3   Upper Body Dressing 3   Grooming 4   Eating 4   Daily Activity Raw Score 20   Daily Activity Standardized Score (Calc for Raw Score >=11) 42 03   AM-[facility] Applied Cognition Inpatient   Following a Speech/Presentation 4   Understanding Ordinary Conversation 4   Taking Medications 4   Remembering Where Things Are Placed or Put Away 4   Remembering List of 4-5 Errands 4   Taking Care of Complicated Tasks 4   Applied Cognition Raw Score 24   Applied Cognition Standardized Score 62 21   Barthel Index   Feeding 10   Bathing 0   Grooming Score 5   Dressing Score 5   Bladder Score 10   Bowels Score 10   Toilet Use Score 5   Transfers (Bed/Chair) Score 10   Mobility (Level Surface) Score 0   Stairs Score 0   Barthel Index Score 55   Modified Fred Scale   Modified [place] Scale 4     Occupational Therapy Goals to be completed in 5-7 Days:    1 - Patient will verbalize and demonstrate use of energy conservation/ deep breathing technique and work simplification skills during functional activity with no verbal cues  2 - Patient will verbalize and demonstrate good body mechanics and joint protection techniques during  ADLs/ IADLs with no verbal cues  3 - Patient will increase OOB/ sitting tolerance to 4-6 hours per day for increased participation in self care and leisure tasks with no s/s of exertion  4 - Patient will increase standing tolerance time to 5 minutes with unilateral UE support to complete sink level ADLs@ mod I level while maintaining NWB restriction  5 - Patient will increase sitting tolerance at edge of bed to 30 minutes to complete UB ADLs @ set up assist level  6 - Patient will transfer bed to Chair / toilet at Set up assist level with AD as indicated  7 - Patient will complete UB ADLs with Mod I assist      8 - Patient will complete LB ADLs with supervision assist with the use of adaptive equipment  9 - Patient will complete toileting hygiene with set up assist/ supervision for thoroughness      10 - Patient/ Family will demonstrate competency with 1 ROMANA Renae/RADHIKA

## 2021-06-25 NOTE — CASE MANAGEMENT
CM met with patient and -Len in patient's room  Patient states they lives in a one story house  There is a ramp to enter from outside  Patient was independent prior to her ankle fracture  Patient uses a walker  At home prior to her surgery  Patient does not have rehab and [facility]  hx  Patient is interested in recommendation for [facility]  Referral sent to [facility]  Patient fills scripts [facility] [place]  Patient names Dr Sepideh Starr as her PCP  Patient denies MH hx  Patient states she smokes 2packs of cigarettes a day  Patient names her  as health representative  Patient declined advance directive information  Patient names her dtrkiya as NOK  Patient is disabled  Family transport to appointments  Patient was independent prior to ankle brake and she drove  Upon clearance for discharge patient will be transported home via [facility]  CM reviewed discharge planning process including the following: identifying help at home, patient preference for discharge planning needs, pharmacy preference, and availability of treatment team to discuss questions or concerns patient and/or family may have regarding understanding medications and recognizing signs and symptoms once discharged  CM also encouraged patient to follow up with all recommended appointments after discharge  Patient advised of importance for patient and family to participate in managing patients medical well being

## 2021-06-25 NOTE — PLAN OF CARE
Problem: MOBILITY - ADULT  Goal: Maintain or return to baseline ADL function  Description: INTERVENTIONS:  -  Assess patient's ability to carry out ADLs; assess patient's baseline for ADL function and identify physical deficits which impact ability to perform ADLs (bathing, care of mouth/teeth, toileting, grooming, dressing, etc )  - Assess/evaluate cause of self-care deficits   - Assess range of motion  - Assess patient's mobility; develop plan if impaired  - Assess patient's need for assistive devices and provide as appropriate  - Encourage maximum independence but intervene and supervise when necessary  - Involve family in performance of ADLs  - Assess for home care needs following discharge   - Consider OT consult to assist with ADL evaluation and planning for discharge  - Provide patient education as appropriate  Outcome: Progressing  Goal: Maintains/Returns to pre admission functional level  Description: INTERVENTIONS:  - Perform BMAT or MOVE assessment daily    - Set and communicate daily mobility goal to care team and patient/family/caregiver  - Collaborate with rehabilitation services on mobility goals if consulted  - Perform Range of Motion 3 times a day  - Reposition patient every 2 hours    - Dangle patient 3 times a day  - Stand patient 3 times a day  - Ambulate patient 3 times a day  - Out of bed to chair 3 times a day   - Out of bed for meals 3 times a day  - Out of bed for toileting  - Record patient progress and toleration of activity level   Outcome: Progressing     Problem: PAIN - ADULT  Goal: Verbalizes/displays adequate comfort level or baseline comfort level  Description: Interventions:  - Encourage patient to monitor pain and request assistance  - Assess pain using appropriate pain scale  - Administer analgesics based on type and severity of pain and evaluate response  - Implement non-pharmacological measures as appropriate and evaluate response  - Consider cultural and social influences on pain and pain management  - Notify physician/advanced practitioner if interventions unsuccessful or patient reports new pain  Outcome: Progressing     Problem: SAFETY ADULT  Goal: Patient will remain free of falls  Description: INTERVENTIONS:  - Educate patient/family on patient safety including physical limitations  - Instruct patient to call for assistance with activity   - Consult OT/PT to assist with strengthening/mobility   - Keep Call bell within reach  - Keep bed low and locked with side rails adjusted as appropriate  - Keep care items and personal belongings within reach  - Initiate and maintain comfort rounds  - Make Fall Risk Sign visible to staff  - Offer Toileting every 2 Hours, in advance of need  - Initiate/Maintain bed and chair alarm  - Obtain necessary fall risk management equipment: yellow bracelet and non skid socks   - Apply yellow socks and bracelet for high fall risk patients  - Consider moving patient to room near nurses station  Outcome: Progressing     Problem: DISCHARGE PLANNING  Goal: Discharge to home or other facility with appropriate resources  Description: INTERVENTIONS:  - Identify barriers to discharge w/patient and caregiver  - Arrange for needed discharge resources and transportation as appropriate  - Identify discharge learning needs (meds, wound care, etc )  - Arrange for interpretive services to assist at discharge as needed  - Refer to Case Management Department for coordinating discharge planning if the patient needs post-hospital services based on physician/advanced practitioner order or complex needs related to functional status, cognitive ability, or social support system  Outcome: Progressing     Problem: Knowledge Deficit  Goal: Patient/family/caregiver demonstrates understanding of disease process, treatment plan, medications, and discharge instructions  Description: Complete learning assessment and assess knowledge base    Interventions:  - Provide teaching at level of understanding  - Provide teaching via preferred learning methods  Outcome: Progressing     Problem: Prexisting or High Potential for Compromised Skin Integrity  Goal: Skin integrity is maintained or improved  Description: INTERVENTIONS:  - Identify patients at risk for skin breakdown  - Assess and monitor skin integrity  - Assess and monitor nutrition and hydration status  - Monitor labs   - Assess for incontinence   - Turn and reposition patient  - Assist with mobility/ambulation  - Relieve pressure over bony prominences  - Avoid friction and shearing  - Provide appropriate hygiene as needed including keeping skin clean and dry  - Evaluate need for skin moisturizer/barrier cream  - Collaborate with interdisciplinary team   - Patient/family teaching  - Consider wound care consult   Outcome: Progressing     Problem: Potential for Falls  Goal: Patient will remain free of falls  Description: INTERVENTIONS:  - Educate patient/family on patient safety including physical limitations  - Instruct patient to call for assistance with activity   - Consult OT/PT to assist with strengthening/mobility   - Keep Call bell within reach  - Keep bed low and locked with side rails adjusted as appropriate  - Keep care items and personal belongings within reach  - Initiate and maintain comfort rounds  - Make Fall Risk Sign visible to staff  - Offer Toileting every 2 Hours, in advance of need  - Initiate/Maintain bed and chair alarm  - Apply yellow socks and bracelet for high fall risk patients  - Consider moving patient to room near nurses station  Outcome: Progressing

## 2021-06-25 NOTE — PROGRESS NOTES
DISCHARGE PLANNING - CARE MANAGEMENT     Discharge to post-acute care or home with appropriate resources Adequate for Discharge        GASTROINTESTINAL - ADULT     Maintains or returns to baseline bowel function Adequate for Discharge     Maintains adequate nutritional intake Adequate for Discharge        METABOLIC, FLUID AND ELECTROLYTES - ADULT     Electrolytes maintained within normal limits Adequate for Discharge     Fluid balance maintained Adequate for Discharge     Glucose maintained within target range Adequate for Discharge        Potential for Falls     Patient will remain free of falls Adequate for Discharge        Prexisting or High Potential for Compromised Skin Integrity     Skin integrity is maintained or improved Adequate for Discharge        RESPIRATORY - ADULT     Achieves optimal ventilation and oxygenation Adequate for Discharge Progress Note - Orthopedics   Tiffany Yoo 60 y o  female MRN: [id number]  Unit/Bed#: -01      Subjective:    60 y  o female POD#4 ORIF right bimalleolar ankle fracture  Patient states that her ankle is doing well overall  Patient states that she has substantial decrease in pain over the past 48 hours or so  Patient states that she still has a mild ache in the ankle but states that it is decreased since surgery  Patient states she has been compliant with anticoagulation therapy  Patient states she has not participated with physical therapy at this time but is looking forward to doing so  Patient denies any fevers any chills  Patient denies any shortness of breath chest tightness chest pain  Patient denies any nausea vomiting diarrhea  Patient denies any numbness or tingling in her toes  Patient denies any calf pain  Patient offers no other complaints at this time        Labs:  0   Lab Value Date/Time    HCT 36 1 06/24/2021 0452    HCT 35 7 06/23/2021 0431    HCT 32 6 (L) 06/22/2021 0506    HGB 11 7 06/24/2021 0452    HGB 11 8 06/23/2021 0431    HGB 10 8 (L) 06/22/2021 0506    WBC 8 78 06/24/2021 0452    WBC 11 20 (H) 06/23/2021 0431    WBC 14 31 (H) 06/22/2021 0506       Meds:    Current Facility-Administered Medications:     acetaminophen (TYLENOL) tablet 650 mg, 650 mg, Oral, Q6H [address], Nereida Lugo PA-C, 650 mg at 06/25/21 0509    aluminum-magnesium hydroxide-simethicone (MYLANTA) oral suspension 30 mL, 30 mL, Oral, Q6H PRN, Terra Lee PA-C    aspirin (ECOTRIN LOW STRENGTH) EC tablet 162 mg, 162 mg, Oral, Daily, Terra Lee PA-C, 162 mg at 06/24/21 0830    carisoprodol (SOMA) tablet 350 mg, 350 mg, Oral, 4x Daily, Terra Lee PA-C, 350 mg at 06/24/21 2202    diazepam (VALIUM) tablet 5 mg, 5 mg, Oral, HS, Lasha Barnes PA-C, 5 mg at 06/24/21 2157    docusate sodium (COLACE) capsule 100 mg, 100 mg, Oral, BID, Terra Lee PA-C, 100 mg at 06/24/21 1714    DULoxetine (CYMBALTA) delayed release capsule 60 mg, 60 mg, Oral, Daily, Lasha Barnes PA-C, 60 mg at 06/24/21 0831    enoxaparin (LOVENOX) subcutaneous injection 30 mg, 30 mg, Subcutaneous, Q12H [facility], Lasha Barnes PA-C, 30 mg at 06/25/21 0033    lactated ringers infusion, 100 mL/hr, Intravenous, Continuous, Tennille Biswas PA-C, Stopped at 06/23/21 0840    methocarbamol (ROBAXIN) tablet 500 mg, 500 mg, Oral, Q6H PRN, YU Toussaint-C, 500 mg at 06/24/21 2036    nicotine (NICODERM CQ) 21 mg/24 hr TD 24 hr patch 21 mg, 21 mg, Transdermal, Daily, Lasha Barnes PA-C, 21 mg at 06/24/21 0831    ondansetron (ZOFRAN) injection 4 mg, 4 mg, Intravenous, Q6H PRN, Tennille Biswas PAKAILA    oxyCODONE (ROXICODONE) immediate release tablet 10 mg, 10 mg, Oral, Q4H PRN, YU Pierce-C, 10 mg at 06/25/21 0644    oxyCODONE (ROXICODONE) IR tablet 5 mg, 5 mg, Oral, Q4H PRN, Tennille Biswas PA-C, 5 mg at 06/21/21 2341    verapamil (CALAN) tablet 360 mg, 360 mg, Oral, Daily, Erika Delacruz PA-C, 360 mg at 06/24/21 0831    zolpidem (AMBIEN) tablet 10 mg, 10 mg, Oral, HS, YU Juarez-C, 10 mg at 06/24/21 2157    Blood Culture:   No results found for: BLOODCX    Wound Culture:   No results found for: WOUNDCULT    Ins and Outs:  I/O last 24 hours: In: 26 [P O :460]  Out: 1050 [Urine:1050]          Physical:  Vitals:    06/24/21 2155   BP: 119/62   Pulse: 73   Resp: 21   Temp: 98 6 °F (37 °C)   SpO2: 93%     Musculoskeletal: right Lower Extremity  · Patient resting in hospital bed in no acute distress  · Skin :  Extremity appears to be well perfused overall  · Dressing  :  Dressing is clean dry intact with no visible soilage present  · TTP  :  Mild tenderness palpation noted over the lateral aspect of the ankle  No other bony or soft tissue tenderness palpation noted at this time  · SILT s/s/sp/dp/t  +fhl/ehl, +ankle dorsi/plantar flexion    2+ DP pulse      Assessment:    60 y  o female POD#4 ORIF right bimalleolar ankle fracture      Plan:  · Non-weight bearing right lower extremity   ·  IVF/prbc as indicated   · PT/OT for ambulation assistance / gait training   · Pain control as directed   · DVT ppx (Lovenox 30mg BID x 6 weeks)   · Dispo:  Ortho will continue to follow  Continue nonweightbearing right lower extremity  Maintain splint at all times  Continue anticoagulation as directed    ortho stable at this time      Debby Cruz PA-C

## 2021-06-25 NOTE — PHYSICAL THERAPY NOTE
Physical Therapy Evaluation     Patient's Name: Beck Nayak    Admitting Diagnosis  Closed bimalleolar fracture of right ankle, initial encounter [[id number]]    Problem List  Patient Active Problem List   Diagnosis    Bimalleolar fracture of right ankle    HTN (hypertension)    Smoking       Past Medical History  Past Medical History:   Diagnosis Date    [facility] ([facility] )     Chronic back pain     Hypertension     Migraine     Non [facility]        Past Surgical History  Past Surgical History:   Procedure Laterality Date    HYSTERECTOMY      MI OPEN TREATMENT BIMALLEOLAR ANKLE FRACTURE Right 6/21/2021    Procedure: OPEN REDUCTION W/ INTERNAL FIXATION (ORIF) RIGHT ANKLE BIMALLEOLAR FRACTURE;  Surgeon: Maynor Chavez MD;  Location: [facility];  Service: Orthopedics    TONSILLECTOMY            06/25/21 1008   PT Last Visit   PT Visit Date 06/25/21   Note Type   Note type Evaluation   Pain Assessment   Pain Assessment Tool Pain Assessment not indicated - pt denies pain   Pain Score No Pain   Home Living   Type of Home Mobile home   Home Layout One level;Performs ADLs on one level;Stairs to enter with rails  (5 SUSAN)   Bathroom Shower/Tub Tub/shower unit   Bathroom Toilet Standard   Bathroom Equipment Hand-held shower;Commode   [address] Walker;Crutches  (pt ambulating with walker prior to admission)   Additional Comments pt reports ramped entrance available upon discharge; pt reports having access to ramp to be installed prior to discharge from [facility]    Prior Function   Level of [address] with ADLs and functional mobility   Lives With Spouse; Family  (granddaughter; dtr lives close by)   Eze Fernandez 30 in the last 6 months 1 to 4   Vocational Unemployed   Comments pt drives at baseline   Restrictions/Precautions   Weight Bearing Precautions Per Order Yes   RLE Weight Bearing Per Order NWB  (per orthopedics)   Braces or Orthoses Other (Comment)  (application RLE short leg splint by ortho)   Other Precautions Bed Alarm; Chair Alarm;WBS;Fall Risk;Multiple lines   General   Additional Pertinent History POD#4 ORIF right bimalleolar ankle fracture   Cognition   Overall Cognitive Status WFL   Arousal/Participation Cooperative   Orientation Level Oriented X4   Memory Within functional limits   Following Commands Follows all commands and directions without difficulty   Comments pt agreeable to PT eval   RUE Assessment   RUE Assessment WFL  (based on functional assessment)   LUE Assessment   LUE Assessment WFL  (based on functional assessment)   RLE Assessment   RLE Assessment X  (not formally tested secondary to NWB RLE)   LLE Assessment   LLE Assessment WFL  (grossly assessed with functional mobility: at least 4/5)   Coordination   Movements are Fluid and Coordinated 1   Sensation WFL   Bed Mobility   Supine to Sit 5  Supervision   Additional items Assist x 1; Increased time required;Verbal cues;HOB elevated   Transfers   Sit to Stand 4  Minimal assistance  (pt able to maintain NWB % of the time)   Additional items Assist x 1; Increased time required;Verbal cues   Stand to Sit 4  Minimal assistance   Additional items Assist x 1; Increased time required;Verbal cues;Armrests   Additional Comments Attempted stair training x2 trials- unable to clear 4" step; pt reports ramped entrance available upon discharge; pt reports having access to ramp to be installed prior to discharge from [place] [id number]   Ambulation/Elevation   Gait pattern Decreased foot clearance; Excessively slow  (pt able to maintain NWB % of the time)   Gait Assistance 4  Minimal assist   Additional items Assist x 1;Verbal cues   Assistive Device Rolling walker   Distance 25'   Stair Management Assistance   (please see above)   Balance   Static Sitting Good   Dynamic Sitting Fair +   Static Standing Fair   Dynamic Standing Fair -   Ambulatory Poor +   Endurance Deficit   Endurance Deficit Yes   Activity Tolerance   Activity Tolerance Patient tolerated treatment well   Medical Staff Made Aware DU Mckenzie   Nurse Made Aware RN Shaina   Assessment   Prognosis Good   Problem List Decreased strength;Decreased endurance; Impaired balance;Decreased mobility;Orthopedic restrictions   Assessment Pt is 60 y o  female seen for PT evaluation s/p admit to [facility] on 2021 w/ Smoking  PT consulted to assess pt's functional mobility and d/c needs  Order placed for PT eval and tx, w/ up w/ A and NWB R LE order  Performed at least 2 patient identifiers during session: Name and   Comorbidities affecting pt's physical performance at time of assessment include: Bimalleolar fracture of right ankle, HTN (hypertension), Non Hodgkin's lymphoma  PTA, pt was independent w/ all functional mobility w/ walker (recently since injury), ambulates household distances, has 5 SUSAN, lives w/ family in one level house and unemployed (pt reports ramped entrance available upon discharge; pt reports having access to ramp to be installed prior to discharge from [place] [id number])  Personal factors affecting pt at time of IE include: ambulating w/ assistive device, inability to navigate level surfaces w/o external assistance, positive fall history, inability to perform IADLs and inability to perform ADLs  Please find objective findings from PT assessment regarding body systems outlined above with impairments and limitations including weakness, impaired balance, decreased endurance, gait deviations, decreased activity tolerance, decreased functional mobility tolerance, fall risk and orthopedic restrictions  The following objective measures performed on IE also reveal limitations: Barthel Index: 55/100 and Modified [place]: 4 (moderate/severe disability)   Pt's clinical presentation is currently unstable/unpredictable seen in pt's presentation of ongoing medical management/monitoring and need for input for mobility technique/safety  Pt to benefit from continued PT tx to address deficits as defined above and maximize level of functional independent mobility and consistency  From PT/mobility standpoint, recommendation at time of d/c would be home with home health rehabilitation pending progress in order to facilitate return to PLOF  Barriers to Discharge None   Barriers to Discharge Comments pt reports ramped entrance available upon discharge; pt reports having access to ramp to be installed prior to discharge from [facility]  (pt with increased family support upon discharge)   Goals   Patient Goals to return home   STG Expiration Date 07/05/21   Short Term Goal #1 In 7-10 days: Increase L LE strength 1/2 grade to facilitate independent mobility, Perform all bed mobility tasks modified independent to decrease caregiver burden, Perform all transfers modified independent maintaining NWB % of the time to improve independence, Ambulate > 25 ft  X 2 trials with RW modified independent w/o LOB and maintaining NWB % of the time, Increase all balance 1 grade to decrease risk for falls, Complete exercise program independently and PT to see and establish goals for elevations when appropriate   PT Treatment Day 0   Plan   Treatment/Interventions Functional transfer training;LE strengthening/ROM; Therapeutic exercise; Endurance training;Patient/family training;Equipment eval/education; Bed mobility;Gait training;Spoke to nursing;OT;Continued evaluation   PT Frequency 5x/wk   Recommendation   PT Discharge Recommendation Home with home health rehabilitation   PT - OK to Discharge Yes  (when medically cleared)   AM-PAC Basic Mobility Inpatient   Turning in Bed Without Bedrails 4   Lying on Back to Sitting on Edge of Flat Bed 3   Moving Bed to Chair 3   Standing Up From Chair 3   Walk in Room 3   Climb 3-5 Stairs 2   Basic Mobility Inpatient Raw Score 18   Basic Mobility Standardized Score 41 05   Modified Reynolds Scale   Modified Reynolds Scale 4   Barthel Index   Feeding 10   Bathing 0   Grooming Score 5   Dressing Score 5   Bladder Score 10   Bowels Score 10   Toilet Use Score 5   Transfers (Bed/Chair) Score 10   Mobility (Level Surface) Score 0   Stairs Score 0   Barthel Index Score 55         Shanon Boswell, PT, DPT

## 2021-06-26 ENCOUNTER — APPOINTMENT (INPATIENT)
Dept: CT IMAGING | Facility: HOSPITAL | Age: 56
DRG: 492 | End: 2021-06-26
Payer: MEDICARE

## 2021-06-26 ENCOUNTER — APPOINTMENT (INPATIENT)
Dept: RADIOLOGY | Facility: HOSPITAL | Age: 56
DRG: 492 | End: 2021-06-26
Payer: MEDICARE

## 2021-06-26 PROBLEM — G25.3 MYOCLONUS: Status: ACTIVE | Noted: 2021-06-26

## 2021-06-26 PROBLEM — G93.40 ACUTE ENCEPHALOPATHY: Status: ACTIVE | Noted: 2021-06-26

## 2021-06-26 LAB
ALBUMIN SERPL BCP-MCNC: 2.6 G/DL (ref 3.5–5)
ALP SERPL-CCNC: 71 U/L (ref 46–116)
ALT SERPL W P-5'-P-CCNC: 13 U/L (ref 12–78)
AMMONIA PLAS-SCNC: <10 UMOL/L (ref 11–35)
ANION GAP SERPL CALCULATED.3IONS-SCNC: 6 MMOL/L (ref 4–13)
AST SERPL W P-5'-P-CCNC: 9 U/L (ref 5–45)
BASE EX.OXY STD BLDV CALC-SCNC: 60.5 % (ref 60–80)
BASE EX.OXY STD BLDV CALC-SCNC: 86.4 % (ref 60–80)
BASE EXCESS BLDV CALC-SCNC: 1.1 MMOL/L
BASE EXCESS BLDV CALC-SCNC: 2.4 MMOL/L
BASOPHILS # BLD AUTO: 0.06 THOUSANDS/ΜL (ref 0–0.1)
BASOPHILS NFR BLD AUTO: 1 % (ref 0–1)
BILIRUB SERPL-MCNC: 0.28 MG/DL (ref 0.2–1)
BUN SERPL-MCNC: 14 MG/DL (ref 5–25)
CALCIUM ALBUM COR SERPL-MCNC: 10.1 MG/DL (ref 8.3–10.1)
CALCIUM SERPL-MCNC: 9 MG/DL (ref 8.3–10.1)
CHLORIDE SERPL-SCNC: 101 MMOL/L (ref 100–108)
CO2 SERPL-SCNC: 30 MMOL/L (ref 21–32)
CREAT SERPL-MCNC: 0.79 MG/DL (ref 0.6–1.3)
D DIMER PPP FEU-MCNC: 3.27 UG/ML FEU
EOSINOPHIL # BLD AUTO: 0.23 THOUSAND/ΜL (ref 0–0.61)
EOSINOPHIL NFR BLD AUTO: 3 % (ref 0–6)
ERYTHROCYTE [DISTWIDTH] IN BLOOD BY AUTOMATED COUNT: 12.7 % (ref 11.6–15.1)
GFR SERPL CREATININE-BSD FRML MDRD: 83 ML/MIN/1.73SQ M
GLUCOSE SERPL-MCNC: 107 MG/DL (ref 65–140)
HCO3 BLDV-SCNC: 25.9 MMOL/L (ref 24–30)
HCO3 BLDV-SCNC: 28.7 MMOL/L (ref 24–30)
HCT VFR BLD AUTO: 32.6 % (ref 34.8–46.1)
HGB BLD-MCNC: 10.4 G/DL (ref 11.5–15.4)
IMM GRANULOCYTES # BLD AUTO: 0.02 THOUSAND/UL (ref 0–0.2)
IMM GRANULOCYTES NFR BLD AUTO: 0 % (ref 0–2)
LYMPHOCYTES # BLD AUTO: 2.55 THOUSANDS/ΜL (ref 0.6–4.47)
LYMPHOCYTES NFR BLD AUTO: 37 % (ref 14–44)
MCH RBC QN AUTO: 32.4 PG (ref 26.8–34.3)
MCHC RBC AUTO-ENTMCNC: 31.9 G/DL (ref 31.4–37.4)
MCV RBC AUTO: 102 FL (ref 82–98)
MONOCYTES # BLD AUTO: 0.6 THOUSAND/ΜL (ref 0.17–1.22)
MONOCYTES NFR BLD AUTO: 9 % (ref 4–12)
NEUTROPHILS # BLD AUTO: 3.38 THOUSANDS/ΜL (ref 1.85–7.62)
NEUTS SEG NFR BLD AUTO: 50 % (ref 43–75)
NRBC BLD AUTO-RTO: 0 /100 WBCS
O2 CT BLDV-SCNC: 10.1 ML/DL
O2 CT BLDV-SCNC: 13.9 ML/DL
PCO2 BLDV: 41.8 MM HG (ref 42–50)
PCO2 BLDV: 52 MM HG (ref 42–50)
PH BLDV: 7.36 [PH] (ref 7.3–7.4)
PH BLDV: 7.41 [PH] (ref 7.3–7.4)
PLATELET # BLD AUTO: 347 THOUSANDS/UL (ref 149–390)
PMV BLD AUTO: 9.1 FL (ref 8.9–12.7)
PO2 BLDV: 33 MM HG (ref 35–45)
PO2 BLDV: 53.3 MM HG (ref 35–45)
POTASSIUM SERPL-SCNC: 4.2 MMOL/L (ref 3.5–5.3)
PROT SERPL-MCNC: 6.8 G/DL (ref 6.4–8.2)
RBC # BLD AUTO: 3.21 MILLION/UL (ref 3.81–5.12)
SODIUM SERPL-SCNC: 137 MMOL/L (ref 136–145)
TSH SERPL DL<=0.05 MIU/L-ACNC: 1.32 UIU/ML (ref 0.36–3.74)
WBC # BLD AUTO: 6.84 THOUSAND/UL (ref 4.31–10.16)

## 2021-06-26 PROCEDURE — 99223 1ST HOSP IP/OBS HIGH 75: CPT | Performed by: PSYCHIATRY & NEUROLOGY

## 2021-06-26 PROCEDURE — 99024 POSTOP FOLLOW-UP VISIT: CPT | Performed by: PHYSICIAN ASSISTANT

## 2021-06-26 PROCEDURE — 84443 ASSAY THYROID STIM HORMONE: CPT | Performed by: PHYSICIAN ASSISTANT

## 2021-06-26 PROCEDURE — 99222 1ST HOSP IP/OBS MODERATE 55: CPT | Performed by: INTERNAL MEDICINE

## 2021-06-26 PROCEDURE — 71275 CT ANGIOGRAPHY CHEST: CPT

## 2021-06-26 PROCEDURE — 85025 COMPLETE CBC W/AUTO DIFF WBC: CPT | Performed by: PHYSICIAN ASSISTANT

## 2021-06-26 PROCEDURE — 82805 BLOOD GASES W/O2 SATURATION: CPT | Performed by: NURSE PRACTITIONER

## 2021-06-26 PROCEDURE — G1004 CDSM NDSC: HCPCS

## 2021-06-26 PROCEDURE — 71046 X-RAY EXAM CHEST 2 VIEWS: CPT

## 2021-06-26 PROCEDURE — 82805 BLOOD GASES W/O2 SATURATION: CPT | Performed by: PHYSICIAN ASSISTANT

## 2021-06-26 PROCEDURE — 82140 ASSAY OF AMMONIA: CPT | Performed by: PHYSICIAN ASSISTANT

## 2021-06-26 PROCEDURE — 85379 FIBRIN DEGRADATION QUANT: CPT | Performed by: NURSE PRACTITIONER

## 2021-06-26 PROCEDURE — 80053 COMPREHEN METABOLIC PANEL: CPT | Performed by: PHYSICIAN ASSISTANT

## 2021-06-26 RX ORDER — ZOLPIDEM TARTRATE 5 MG/1
5 TABLET ORAL
Status: DISCONTINUED | OUTPATIENT
Start: 2021-06-26 | End: 2021-06-28 | Stop reason: HOSPADM

## 2021-06-26 RX ORDER — LANOLIN ALCOHOL/MO/W.PET/CERES
400 CREAM (GRAM) TOPICAL DAILY
Status: DISCONTINUED | OUTPATIENT
Start: 2021-06-26 | End: 2021-06-28 | Stop reason: HOSPADM

## 2021-06-26 RX ORDER — VERAPAMIL HYDROCHLORIDE 120 MG/1
240 TABLET, FILM COATED ORAL DAILY
Status: DISCONTINUED | OUTPATIENT
Start: 2021-06-27 | End: 2021-06-28 | Stop reason: HOSPADM

## 2021-06-26 RX ORDER — LANOLIN ALCOHOL/MO/W.PET/CERES
100 CREAM (GRAM) TOPICAL DAILY
Status: DISCONTINUED | OUTPATIENT
Start: 2021-06-26 | End: 2021-06-28 | Stop reason: HOSPADM

## 2021-06-26 RX ORDER — CARISOPRODOL 350 MG/1
350 TABLET ORAL 2 TIMES DAILY
Status: DISCONTINUED | OUTPATIENT
Start: 2021-06-26 | End: 2021-06-27

## 2021-06-26 RX ADMIN — ACETAMINOPHEN 650 MG: 325 TABLET ORAL at 17:56

## 2021-06-26 RX ADMIN — IOHEXOL 85 ML: 350 INJECTION, SOLUTION INTRAVENOUS at 22:49

## 2021-06-26 RX ADMIN — ENOXAPARIN SODIUM 30 MG: 30 INJECTION SUBCUTANEOUS at 00:56

## 2021-06-26 RX ADMIN — OXYCODONE HYDROCHLORIDE 10 MG: 10 TABLET ORAL at 17:56

## 2021-06-26 RX ADMIN — NICOTINE 21 MG: 21 PATCH, EXTENDED RELEASE TRANSDERMAL at 09:15

## 2021-06-26 RX ADMIN — ACETAMINOPHEN 650 MG: 325 TABLET ORAL at 05:12

## 2021-06-26 RX ADMIN — DIAZEPAM 5 MG: 5 TABLET ORAL at 23:10

## 2021-06-26 RX ADMIN — OXYCODONE HYDROCHLORIDE 10 MG: 10 TABLET ORAL at 05:15

## 2021-06-26 RX ADMIN — CARISOPRODOL 350 MG: 350 TABLET ORAL at 12:46

## 2021-06-26 RX ADMIN — DULOXETINE HYDROCHLORIDE 60 MG: 30 CAPSULE, DELAYED RELEASE ORAL at 09:14

## 2021-06-26 RX ADMIN — DOCUSATE SODIUM 100 MG: 100 CAPSULE, LIQUID FILLED ORAL at 17:56

## 2021-06-26 RX ADMIN — ACETAMINOPHEN 650 MG: 325 TABLET ORAL at 23:10

## 2021-06-26 RX ADMIN — CARISOPRODOL 350 MG: 350 TABLET ORAL at 09:14

## 2021-06-26 RX ADMIN — ASPIRIN 162 MG: 81 TABLET, COATED ORAL at 09:14

## 2021-06-26 RX ADMIN — OXYCODONE HYDROCHLORIDE 10 MG: 10 TABLET ORAL at 22:30

## 2021-06-26 RX ADMIN — ACETAMINOPHEN 650 MG: 325 TABLET ORAL at 12:45

## 2021-06-26 RX ADMIN — ZOLPIDEM TARTRATE 5 MG: 5 TABLET, COATED ORAL at 23:11

## 2021-06-26 RX ADMIN — DOCUSATE SODIUM 100 MG: 100 CAPSULE, LIQUID FILLED ORAL at 09:14

## 2021-06-26 RX ADMIN — CARISOPRODOL 350 MG: 350 TABLET ORAL at 22:30

## 2021-06-26 RX ADMIN — ACETAMINOPHEN 650 MG: 325 TABLET ORAL at 00:56

## 2021-06-26 RX ADMIN — THIAMINE HCL TAB 100 MG 100 MG: 100 TAB at 12:30

## 2021-06-26 RX ADMIN — FOLIC ACID TAB 400 MCG 400 MCG: 400 TAB at 12:30

## 2021-06-26 RX ADMIN — ENOXAPARIN SODIUM 30 MG: 30 INJECTION SUBCUTANEOUS at 12:30

## 2021-06-26 NOTE — PROGRESS NOTES
Progress Note - Orthopedics   Hans Paget 60 y o  female MRN: [id number]  Unit/Bed#: -01      Subjective:    60 y  o female POD#5 right bimalleolar FX ORIF  Patient was vigorously coughing upon entering into the room  Patient responsive but generally confused in nature  Was aware of her name but unaware to time and space upon questioning  Patient denies any pain in her leg currently  Patient does admit to having mild pain in her abdomen but no shortness of breath or chest tightness at this time  Patient denies any dizziness or lightheadedness      Labs:  0   Lab Value Date/Time    HCT 32 6 (L) 06/26/2021 1027    HCT 36 1 06/24/2021 0452    HCT 35 7 06/23/2021 0431    HGB 10 4 (L) 06/26/2021 1027    HGB 11 7 06/24/2021 0452    HGB 11 8 06/23/2021 0431    WBC 6 84 06/26/2021 1027    WBC 8 78 06/24/2021 0452    WBC 11 20 (H) 06/23/2021 0431       Meds:    Current Facility-Administered Medications:     acetaminophen (TYLENOL) tablet 650 mg, 650 mg, Oral, Q6H [address], [place] TAD Steiner, 650 mg at 06/26/21 1363    aluminum-magnesium hydroxide-simethicone (MYLANTA) oral suspension 30 mL, 30 mL, Oral, Q6H PRN, Nubia Nieves PA-C    aspirin (ECOTRIN LOW STRENGTH) EC tablet 162 mg, 162 mg, Oral, Daily, Nubia Nieves PA-C, 162 mg at 06/26/21 0914    carisoprodol (SOMA) tablet 350 mg, 350 mg, Oral, 4x Daily, Lasha Barnes PA-C, 350 mg at 06/26/21 0914    diazepam (VALIUM) tablet 5 mg, 5 mg, Oral, HS, Lasha Barnes PA-C, 5 mg at 06/25/21 2153    docusate sodium (COLACE) capsule 100 mg, 100 mg, Oral, BID, Nubia Nieves PA-C, 100 mg at 06/26/21 0914    DULoxetine (CYMBALTA) delayed release capsule 60 mg, 60 mg, Oral, Daily, Lasha Barnes PA-C, 60 mg at 06/26/21 0914    enoxaparin (LOVENOX) subcutaneous injection 30 mg, 30 mg, Subcutaneous, Q12H [address], Lasha Barnes PA-C, 30 mg at 83/73/12 5587    folic acid (FOLVITE) tablet 400 mcg, 400 mcg, Oral, Daily, [place] Rubén Alarcon MD    methocarbamol (ROBAXIN) tablet 500 mg, 500 mg, Oral, Q6H PRN, YU Wood-C, 500 mg at 06/24/21 2036    nicotine (NICODERM CQ) 21 mg/24 hr TD 24 hr patch 21 mg, 21 mg, Transdermal, Daily, MARYAN JuarezC, 21 mg at 06/26/21 0915    ondansetron (ZOFRAN) injection 4 mg, 4 mg, Intravenous, Q6H PRN, Xochilt Cheung PA-C    oxyCODONE (ROXICODONE) immediate release tablet 10 mg, 10 mg, Oral, Q4H PRN, Xochilt Creameleni PA-C, 10 mg at 06/26/21 0515    oxyCODONE (ROXICODONE) IR tablet 5 mg, 5 mg, Oral, Q4H PRN, Xochilt Cheung PA-C, 5 mg at 06/21/21 2341    thiamine tablet 100 mg, 100 mg, Oral, Daily, Mick Pina MD    verapamil (CALAN) tablet 360 mg, 360 mg, Oral, Daily, Luis Mariano PA-C, 360 mg at 06/25/21 0916    zolpidem (AMBIEN) tablet 10 mg, 10 mg, Oral, HS, YU Juarez-C, 10 mg at 06/25/21 2153    Blood Culture:   No results found for: BLOODCX    Wound Culture:   No results found for: WOUNDCULT    Ins and Outs:  No intake/output data recorded  Physical:  Vitals:    06/26/21 0824   BP: 96/55   Pulse: 79   Resp:    Temp: 97 5 °F (36 4 °C)   SpO2: 91%     Musculoskeletal: right Lower Extremity  · Skin  :  Extremity appears well-perfused overall  · Dressing  :  Dressing clean dry intact with no visible soilage present   · Patient unable to participate in remainder of musculoskeletal exam secondary to altered mental status  · 2+ DP pulse    Assessment:    60 y  o female POD#5 right bimalleolar FX ORIF      Plan:  · Nonweightbearing right lower extremity   · PT/OT  · Pain control as directed   · DVT ppx as directed   · Dispo:  Ortho will continue to follow at this time  Acute altered mental status present on examination  Change likely occurred early this morning  Vitals on examination WNL  Stat internal Medicine referral placed for evaluation of altered mental status    Unable to assess orthopedic condition at this time secondary to altered mental status  Stat lab work placed for evaluation  Discussed case with slim attending  Attempted to reach out to patient's  multiple times to evaluate patient's potential alcohol use but was unsuccessful  Neurology consult placed as per SLIM recommendation  Will continue to monitor closely       Maryjane Palacios PA-C

## 2021-06-26 NOTE — CONSULTS
[address] 1965, 60 y o  female MRN: [id number]  Unit/Bed#: -01 Encounter: [id number]  Primary Care Provider: No primary care provider on file  Date and time admitted to hospital: 6/21/2021  9:54 AM    Inpatient consult to Internal Medicine  Consult performed by: Mao Ramos MD  Consult ordered by: Romulo Van PA-C          Acute encephalopathy  Assessment & Plan  Patient seems to be confused and disoriented at times on 06/26/2021 morning  Unclear onset but probably within 24 hours  Also has myoclonic jerks in upper extremities at times  Neurological exam otherwise nonfocal so far  Potential etiologies would include acute metabolic encephalopathy, medication related encephalopathy ((patient is on carisoprodol, diazepam, duloxetine, zolpidem, PRN methocarbamol and PRN oxycodone)), less likely but possible seizure activity although patient does have ability to interact and no LOC  Based on current exam CVA seems unlikely  No tachycardia/HTN or fever to suspect overt withdrawal although drinking status or use of other drugs prior to admission unclear  I would start workup for encephalopathy and myoclonus by checking VBG, TSH, B12, ammonia levels  Periodic neuro-checks  If she develops focality on exam (new unilateral deficit, facial droop, changes in her pupils or eye movements, etc) would recommend STAT CT at that point  Can start empirically on thiamine and folate supplementation  Would recommend a neurology evaluation as she may benefit from EEG if above workup negative  Also would appreciate input in regards to medications  I would recommend continued hospital stay at this point until she medically improves  Myoclonus  Assessment & Plan  Noted in upper extremities, also on face at times  Neurology input will be appreciated as mentioned above      HTN (hypertension)  Assessment & Plan  Chronically on verapamil which is being continued initially during hospitalization  Her blood pressure is actually on the lower side, I would cut her dose to 240 mg daily instead of 360 at this point  Monitor BP    Bimalleolar fracture of right ankle  Assessment & Plan  Underwent ORIF 6/21/2021  Orthopedics is primary service, postoperative management by them    * Smoking  Assessment & Plan  Patient would benefit from smoking cessation, this was discussed and it will be reinforced throughout the hospitalization      VTE Prophylaxis: Sequential compression device (Venodyne)   / sequential compression device     Recommendations for Discharge:  · Not medically cleared at this point given the new onset myoclonus    Counseling / Coordination of Care Time: 30 minutes  Greater than 50% of total time spent on patient counseling and coordination of care  Collaboration of Care: Were Recommendations Directly Discussed with Primary Treatment Team? - Yes     History of Present Illness:    Chely Pritchard is a 60 y o  female who is originally admitted to the orthopedics service due to R ankle fracture  We are consulted for new onset change in mental status and myoclonic jerks  The patient was admitted for to 5 days ago and orthopedic service because of her right ankle fracture  She underwent operative correction on 06/21/2021  She was doing fine postoperatively however in the morning of 06/26/2021 she seems to be somewhat confused at times and with jerking movements in her extremities so internal medicine consult is requested  On review of records I see that patient has a history of smoking, hypertension  Otherwise no previous neurological history reported  Drinking status or use of drugs is unclear  Apparently patient has been on duloxetine as well although indication for this is unclear  Her blood work reveals increased bicarbonate at 30  Hemoglobin of 10 4 with high MCV     On my interrogation she is able to answer simple questions but she is somewhat encephalopathic in regards to location and situation  She denies any chest pain or shortness of breath at this point  She denies a loss of consciousness  She is able to move all upper and lower extremities although she is exhibiting myoclonic type of tremors in upper extremities, there is also noted her face at times  No overt vomiting  Vitals otherwise stable  Review of Systems:    Review of Systems   Neurological:        Medical problems:  Episodic confusion   All other systems reviewed and are negative  More than 10 systems reviewed and negative except for above    Past Medical and Surgical History:     Past Medical History:   Diagnosis Date    Cancer [facility] )     Chronic back pain     Hypertension     Migraine     Non Hodgkin's lymphoma ([facility] [facility] )        Past Surgical History:   Procedure Laterality Date    HYSTERECTOMY      NH OPEN TREATMENT BIMALLEOLAR ANKLE FRACTURE Right 6/21/2021    Procedure: OPEN REDUCTION W/ INTERNAL FIXATION (ORIF) RIGHT ANKLE BIMALLEOLAR FRACTURE;  Surgeon: Dany Watson MD;  Location: [facility];  Service: Orthopedics    TONSILLECTOMY         Meds/Allergies:    all medications and allergies reviewed    Allergies:    Allergies   Allergen Reactions    Morphine Hives    Prednisone      After 2 days, she had swollen tongue,        Social History:     Marital Status: /Civil Union    Substance Use History:   Social History     Substance and Sexual Activity   Alcohol Use Not Currently     Social History     Tobacco Use   Smoking Status Current Every Day Smoker    Packs/day: 0 25   Smokeless Tobacco Never Used     Social History     Substance and Sexual Activity   Drug Use Never       Family History:    non-contributory    Physical Exam:     Vitals:   Blood Pressure: 96/55 (06/26/21 0824)  Pulse: 79 (06/26/21 0824)  Temperature: 97 5 °F (36 4 °C) (06/26/21 0824)  Temp Source: Oral (06/25/21 2305)  Respirations: 21 (06/24/21 2155)  Height: 5' 7" (170 2 cm) (06/21/21 1015)  Weight - Scale: 81 2 kg (179 lb) (06/21/21 1015)  SpO2: 91 % (06/26/21 0824)    Physical Exam  Vitals and nursing note reviewed  Constitutional:       Appearance: Normal appearance  She is normal weight  Comments: Middle-aged female in bed     HENT:      Head: Normocephalic and atraumatic  Right Ear: External ear normal       Left Ear: External ear normal       Nose: Nose normal  No congestion  Mouth/Throat:      Mouth: Mucous membranes are moist       Pharynx: Oropharynx is clear  No oropharyngeal exudate or posterior oropharyngeal erythema  Eyes:      General: No scleral icterus  Right eye: No discharge  Left eye: No discharge  Extraocular Movements: Extraocular movements intact  Conjunctiva/sclera: Conjunctivae normal       Pupils: Pupils are equal, round, and reactive to light  Cardiovascular:      Rate and Rhythm: Normal rate and regular rhythm  Pulses: Normal pulses  Heart sounds: Normal heart sounds  No murmur heard  No friction rub  No gallop  Pulmonary:      Effort: Pulmonary effort is normal  No respiratory distress  Breath sounds: Normal breath sounds  No stridor  No wheezing, rhonchi or rales  Chest:      Chest wall: No tenderness  Abdominal:      General: Abdomen is flat  Bowel sounds are normal  There is no distension  Palpations: Abdomen is soft  There is no mass  Tenderness: There is no abdominal tenderness  There is no guarding or rebound  Musculoskeletal:         General: No swelling, tenderness, deformity or signs of injury  Normal range of motion  Cervical back: Normal range of motion and neck supple  No rigidity  No muscular tenderness  Comments: Status post surgery in right ankle   Skin:     General: Skin is warm and dry  Capillary Refill: Capillary refill takes less than 2 seconds  Coloration: Skin is not jaundiced or pale  Findings: No bruising, erythema, lesion or rash  Neurological:      General: No focal deficit present  Mental Status: She is alert  Mental status is at baseline  She is disoriented  Cranial Nerves: No cranial nerve deficit  Sensory: No sensory deficit  Motor: No weakness  Coordination: Coordination normal       Comments: Oriented to self and place but not time or situation  Able to comprehend and answer questions despite that  There are myoclonic jerks observed in bilateral upper extremities with outstretched hands, episode of bilateral facial twitching as well  Otherwise no obvious cranial nerve deficit  Otherwise strength is 5/5 in upper and lower extremities proximally and distally  Sensitivity preserved  DTR 2+   Psychiatric:         Mood and Affect: Mood normal          Behavior: Behavior normal          Thought Content: Thought content normal          Judgment: Judgment normal          Additional Data:     Lab Results: I have personally reviewed pertinent reports  Results from last 7 days   Lab Units 06/26/21  1027   WBC Thousand/uL 6 84   HEMOGLOBIN g/dL 10 4*   HEMATOCRIT % 32 6*   PLATELETS Thousands/uL 347   NEUTROS PCT % 50   LYMPHS PCT % 37   MONOS PCT % 9   EOS PCT % 3     Results from last 7 days   Lab Units 06/26/21  1027   SODIUM mmol/L 137   POTASSIUM mmol/L 4 2   CHLORIDE mmol/L 101   CO2 mmol/L 30   BUN mg/dL 14   CREATININE mg/dL 0 79   ANION GAP mmol/L 6   CALCIUM mg/dL 9 0   ALBUMIN g/dL 2 6*   TOTAL BILIRUBIN mg/dL 0 28   ALK PHOS U/L 71   ALT U/L 13   AST U/L 9   GLUCOSE RANDOM mg/dL 107             No results found for: HGBA1C            Imaging: I have personally reviewed pertinent reports  XR ankle 3+ vw right   Final Result by Nicole Singleton MD (06/22 8897)      Fluoroscopic guidance provided for procedure guidance  Please refer to the separate procedure notes for additional details            Workstation performed: [id number]               ** Please Note: This note has been constructed using a voice recognition system   **

## 2021-06-26 NOTE — ASSESSMENT & PLAN NOTE
Patient seems to be confused and disoriented at times on 06/26/2021 morning  Neurological exam otherwise nonfocal so far  Potential etiologies would include acute metabolic encephalopathy, medication related encephalopathy (patient is on carisoprodol, diazepam, duloxetine, zolpidem, PRN methocarbamol and PRN oxycodone)  checking VBG, TSH, B12, ammonia levels  Can start empirically on thiamine and folate supplementation    Neurology following

## 2021-06-26 NOTE — QUICK NOTE
Message from Dr Shaylee Bagley (ortho) regarding his concerns about patient low BP, hypoxia, mental status changes  Patient with hx tobacco use, on few meds that can cause sedation, also on verapamil and ASA  Labs reviewed, vitals reviewed  Also review of chart indicates a DVT in 2019 of LE  Will check D Dimer and CXR  Message from ortho discussed with DARRELL Darden and Louann Cornell PA-C in house [facility] providers who will follow up with patient  Will await test results and proceed with further workup as indicated

## 2021-06-26 NOTE — ASSESSMENT & PLAN NOTE
Patient seems to be confused and disoriented at times on 06/26/2021 morning  Unclear onset but probably within 24 hours  Also has myoclonic jerks in upper extremities at times  Neurological exam otherwise nonfocal so far  Potential etiologies would include acute metabolic encephalopathy, medication related encephalopathy ((patient is on carisoprodol, diazepam, duloxetine, zolpidem, PRN methocarbamol and PRN oxycodone)), less likely but possible seizure activity although patient does have ability to interact and no LOC  Based on current exam CVA seems unlikely  No tachycardia/HTN or fever to suspect overt withdrawal although drinking status or use of other drugs prior to admission unclear  I would start workup for encephalopathy and myoclonus by checking VBG, TSH, B12, ammonia levels  Periodic neuro-checks  If she develops focality on exam (new unilateral deficit, facial droop, changes in her pupils or eye movements, etc) would recommend STAT CT at that point  Can start empirically on thiamine and folate supplementation  Would recommend a neurology evaluation as she may benefit from EEG if above workup negative  Also would appreciate input in regards to medications  I would recommend continued hospital stay at this point until she medically improves

## 2021-06-26 NOTE — CONSULTS
Consultation - Neurology   Luis Brock 60 y o  female MRN: [id number]  Unit/Bed#: -01 Encounter: [id number]      Assessment/Plan     Acute encephalopathy  Assessment & Plan   60 y  o female with a history of non-Hodgkin's lymphoma, migraine, hypertension, chronic back pain on Soma and tobacco use who sustained a right bimalleolar ankle fracture on 06/09/2021 and was admitted to the hospital electively for surgical repair  She is POD# 5 from right bimalleolar FX ORIF  Neurology was consulted due to bilateral upper extremity myoclonic episodes as well as some intermittent confusion  On exam, patient notably dysarthric, tremulousness and subtle occasional confusion  Suspect etiology to be toxic metabolic in the setting of multiple sedating medications including: Some medications of Soma, Ambien, Valium; in addition to acute medications of Oxycodone and Robaxin  - Decrease Soma to 350 bid (down from qid)  - Decrease Ambien dose to 5mg  - IVF hydration/PO hydration      Myoclonus  Assessment & Plan  - Suspected to be secondary to medication effect  - Proceed as stated above        Recommendations for outpatient neurological follow up have yet to be determined  History of Present Illness     Reason for Consult / Principal Problem:  Bilateral myoclonus episodes in upper extremities, episodic confusion-metabolic versus drug-induced; AMS  Hx and PE limited by:  Episodic confusion  HPI: Corwin Ramirez is a 60 y  o female with a history of non-Hodgkin's lymphoma, migraine, hypertension, chronic back pain on Soma and tobacco use who sustained a right bimalleolar ankle fracture on 06/09/2021 who was admitted to the hospital electively for surgical repair  Patient is currently postop day 5 from right bimalleolar FX ORIF  Neurology was consulted due to bilateral upper extremity myoclonic episodes as well as some intermittent confusion      Today on exam, patient reports that she is 'just tired " Initial interaction is notable for significant dysarthria  Patient's mouth seems extremely dry as well  Patient does report that she has chronic back pain with spasms for which she is prescribed Soma bby Dr Nicole Beltre  Currently she denies pain, HA, CP, SOB  She denies numbness or weakness of an extremity  Inpatient consult to Neurology  Consult performed by: Alison Estes PA-C  Consult ordered by: Radha Billy MD          Review of Systems A 12 point ROS was completed and other than the above mentioned complaints in the HPI, all remaining systems were negative  Historical Information   Past Medical History:   Diagnosis Date    [facility] [address] [facility]     Chronic back pain     Hypertension     Migraine     Non Hodgkin's lymphoma ([facility] )      Past Surgical History:   Procedure Laterality Date    HYSTERECTOMY      [facility] OPEN TREATMENT BIMALLEOLAR ANKLE FRACTURE Right 6/21/2021    Procedure: OPEN REDUCTION W/ INTERNAL FIXATION (ORIF) RIGHT ANKLE BIMALLEOLAR FRACTURE;  Surgeon: Marino Mcelroy MD;  Location[facility];  Service: Orthopedics    TONSILLECTOMY       Social History   Social History     Substance and Sexual Activity   Alcohol Use Not Currently     Social History     Substance and Sexual Activity   Drug Use Never     E-Cigarette/Vaping    E-Cigarette Use Never User      E-Cigarette/Vaping Substances    Nicotine No     THC No     CBD No     Flavoring No     Other No     Unknown No      Social History     Tobacco Use   Smoking Status Current Every Day Smoker    Packs/day: 0 25   Smokeless Tobacco Never Used     Family History: History reviewed  No pertinent family history  Review of previous medical records was completed       Meds/Allergies   all current active meds have been reviewed    Allergies   Allergen Reactions    Morphine Hives    Prednisone      After 2 days, she had swollen tongue,        Objective   Vitals:Blood pressure 97/56, pulse 78, temperature 98 °F (36 7 °C), temperature source Oral, resp  rate 20, height 5' 7" (1 702 m), weight 81 2 kg (179 lb), SpO2 (!) 89 %  ,Body mass index is 28 04 kg/m²  No intake or output data in the 24 hours ending 06/26/21 1642    Invasive Devices: Invasive Devices     Peripheral Intravenous Line            Peripheral IV 06/21/21 Right Antecubital 5 days              Exam completed by Dr Gypsy Chilel with myself at bedside  Physical Exam  Vitals reviewed  Constitutional:       General: She is not in acute distress  Appearance: Normal appearance  She is well-developed  She is not ill-appearing or diaphoretic  HENT:      Head: Normocephalic and atraumatic  Eyes:      General: No scleral icterus  Right eye: No discharge  Left eye: No discharge  Extraocular Movements: Extraocular movements intact and EOM normal       Conjunctiva/sclera: Conjunctivae normal       Pupils: Pupils are equal, round, and reactive to light  Cardiovascular:      Rate and Rhythm: Normal rate and regular rhythm  Pulmonary:      Effort: Pulmonary effort is normal  No respiratory distress  Musculoskeletal:         General: No tenderness or deformity  Normal range of motion  Cervical back: Normal range of motion and neck supple  Skin:     General: Skin is warm and dry  Findings: No erythema or rash  Neurological:      Mental Status: She is alert  Coordination: Finger-nose-finger test: end point dysmetria bilaterally, no ataxia  Neurologic Exam     Mental Status   Knowledge: good  Somnolent, but able to maintain awake state during exam  Able to repeat but quite dysarthric  Oriented x3  States she lives w  and grand-daughter  Cranial Nerves     CN II   Visual acuity: (grossly)    CN III, IV, VI   Pupils are equal, round, and reactive to light  Extraocular motions are normal    Nystagmus: none   Conjugate gaze: present    CN VII   Facial expression full, symmetric     Tongue is dry, midline  VF: Decreased inferiorly bilaterally     Motor Exam   Muscle bulk: normal  Overall muscle tone: normal  Right arm pronator drift: absent  Left arm pronator drift: absent    Strength   Strength 5/5 except as noted  Unable to assess RLE distal strength due to fracture  Sensory Exam   Light touch normal    Pinprick normal      Gait, Coordination, and Reflexes     Coordination   Finger-nose-finger test: end point dysmetria bilaterally, no ataxia  Reflexes   Right plantar reflex: Unable to evaluate due to ankle fracture  Left plantar: normal  Tremulous throughout  No asterixis  DTRs in [address] [place] [address] [address]: 1+ throughout       Lab Results: I have personally reviewed pertinent reports  Imaging Studies: I have personally reviewed pertinent films in PACS  EKG, Pathology, and Other Studies: I have personally reviewed pertinent reports      VTE Prophylaxis: Enoxaparin (Lovenox)    Code Status: Level 1 - Full Code

## 2021-06-26 NOTE — PLAN OF CARE
Problem: MOBILITY - ADULT  Goal: Maintain or return to baseline ADL function  Description: INTERVENTIONS:  -  Assess patient's ability to carry out ADLs; assess patient's baseline for ADL function and identify physical deficits which impact ability to perform ADLs (bathing, care of mouth/teeth, toileting, grooming, dressing, etc )  - Assess/evaluate cause of self-care deficits   - Assess range of motion  - Assess patient's mobility; develop plan if impaired  - Assess patient's need for assistive devices and provide as appropriate  - Encourage maximum independence but intervene and supervise when necessary  - Involve family in performance of ADLs  - Assess for home care needs following discharge   - Consider OT consult to assist with ADL evaluation and planning for discharge  - Provide patient education as appropriate  Outcome: Progressing  Goal: Maintains/Returns to pre admission functional level  Description: INTERVENTIONS:  - Perform BMAT or MOVE assessment daily    - Set and communicate daily mobility goal to care team and patient/family/caregiver  - Collaborate with rehabilitation services on mobility goals if consulted  - Perform Range of Motion  times a day  - Reposition patient every  hours    - Dangle patient  times a day  - Stand patient  times a day  - Ambulate patient  times a day  - Out of bed to chair  times a day   - Out of bed for meals  times a day  - Out of bed for toileting  - Record patient progress and toleration of activity level   Outcome: Progressing     Problem: PAIN - ADULT  Goal: Verbalizes/displays adequate comfort level or baseline comfort level  Description: Interventions:  - Encourage patient to monitor pain and request assistance  - Assess pain using appropriate pain scale  - Administer analgesics based on type and severity of pain and evaluate response  - Implement non-pharmacological measures as appropriate and evaluate response  - Consider cultural and social influences on pain and pain management  - Notify physician/advanced practitioner if interventions unsuccessful or patient reports new pain  Outcome: Progressing     Problem: SAFETY ADULT  Goal: Patient will remain free of falls  Description: INTERVENTIONS:  - Educate patient/family on patient safety including physical limitations  - Instruct patient to call for assistance with activity   - Consult OT/PT to assist with strengthening/mobility   - Keep Call bell within reach  - Keep bed low and locked with side rails adjusted as appropriate  - Keep care items and personal belongings within reach  - Initiate and maintain comfort rounds  - Make Fall Risk Sign visible to staff  - Offer Toileting every  Hours, in advance of need  - Initiate/Maintain alarm  - Obtain necessary fall risk management equipment:   - Apply yellow socks and bracelet for high fall risk patients  - Consider moving patient to room near nurses station  Outcome: Progressing     Problem: DISCHARGE PLANNING  Goal: Discharge to home or other facility with appropriate resources  Description: INTERVENTIONS:  - Identify barriers to discharge w/patient and caregiver  - Arrange for needed discharge resources and transportation as appropriate  - Identify discharge learning needs (meds, wound care, etc )  - Arrange for interpretive services to assist at discharge as needed  - Refer to Case Management Department for coordinating discharge planning if the patient needs post-hospital services based on physician/advanced practitioner order or complex needs related to functional status, cognitive ability, or social support system  Outcome: Progressing     Problem: Knowledge Deficit  Goal: Patient/family/caregiver demonstrates understanding of disease process, treatment plan, medications, and discharge instructions  Description: Complete learning assessment and assess knowledge base    Interventions:  - Provide teaching at level of understanding  - Provide teaching via preferred learning methods  Outcome: Progressing     Problem: Prexisting or High Potential for Compromised Skin Integrity  Goal: Skin integrity is maintained or improved  Description: INTERVENTIONS:  - Identify patients at risk for skin breakdown  - Assess and monitor skin integrity  - Assess and monitor nutrition and hydration status  - Monitor labs   - Assess for incontinence   - Turn and reposition patient  - Assist with mobility/ambulation  - Relieve pressure over bony prominences  - Avoid friction and shearing  - Provide appropriate hygiene as needed including keeping skin clean and dry  - Evaluate need for skin moisturizer/barrier cream  - Collaborate with interdisciplinary team   - Patient/family teaching  - Consider wound care consult   Outcome: Progressing     Problem: Potential for Falls  Goal: Patient will remain free of falls  Description: INTERVENTIONS:  - Educate patient/family on patient safety including physical limitations  - Instruct patient to call for assistance with activity   - Consult OT/PT to assist with strengthening/mobility   - Keep Call bell within reach  - Keep bed low and locked with side rails adjusted as appropriate  - Keep care items and personal belongings within reach  - Initiate and maintain comfort rounds  - Make Fall Risk Sign visible to staff  - Offer Toileting every  Hours, in advance of need  - Initiate/Maintain alarm  - Obtain necessary fall risk management equipment:   - Apply yellow socks and bracelet for high fall risk patients  - Consider moving patient to room near nurses station  Outcome: Progressing

## 2021-06-26 NOTE — ASSESSMENT & PLAN NOTE
60 y o female with a history of non-Hodgkin's lymphoma, migraine, hypertension, chronic back pain on Soma and tobacco use who sustained a right bimalleolar ankle fracture on 06/09/2021 and was admitted to the hospital electively for surgical repair  She is POD# 5 from right bimalleolar FX ORIF  Neurology was consulted due to bilateral upper extremity myoclonic episodes as well as some intermittent confusion  On exam, patient notably dysarthric, tremulousness and subtle occasional confusion  Suspect etiology to be toxic metabolic in the setting of multiple sedating medications including: Some medications of Soma, Ambien, Valium; in addition to acute medications of Oxycodone and Robaxin       - Decrease Soma to 350 bid (down from qid)  - Decrease Ambien dose to 5mg  - IVF hydration/PO hydration

## 2021-06-26 NOTE — ASSESSMENT & PLAN NOTE
Noted in upper extremities, also on face at times    Neurology consulted recommending-Suspected to be secondary to medication effect  - Discountned Soma to 350 bid (down from qid)  - Decrease Ambien dose to 5mg  - IVF hydration/PO hydration

## 2021-06-26 NOTE — ASSESSMENT & PLAN NOTE
Patient would benefit from smoking cessation, this was discussed and it will be reinforced throughout the hospitalization

## 2021-06-26 NOTE — ASSESSMENT & PLAN NOTE
Chronic neuropathy which is being continued  Her blood pressure is actually on the lower side, I would cut her dose to 240 mg daily instead of 360 at this point    Monitor BP

## 2021-06-26 NOTE — ASSESSMENT & PLAN NOTE
Chronically on verapamil which is being continued initially during hospitalization      Initially hypotensive  Verapamil dose come from 360 mg to 240 mg continue at this dose for now

## 2021-06-26 NOTE — PHYSICAL THERAPY NOTE
Physical Therapy Cancellation Note    Chart review performed  At this time, PT treatment cancelled secondary to patient is not medically appropriate to participate with PT, discussed with Ortho PA Tung Parker and RN  PT will follow and treat as appropriate       06/26/21 0938   PT Last Visit   PT Visit Date 06/26/21   Note Type   Note Type Treatment   Cancel Reasons Medical status     Shantanu Armando, PT, DPT

## 2021-06-26 NOTE — ASSESSMENT & PLAN NOTE
Noted in upper extremities, also on face at times  Neurology input will be appreciated as mentioned above

## 2021-06-27 PROCEDURE — 99231 SBSQ HOSP IP/OBS SF/LOW 25: CPT | Performed by: PSYCHIATRY & NEUROLOGY

## 2021-06-27 PROCEDURE — 99024 POSTOP FOLLOW-UP VISIT: CPT | Performed by: PHYSICIAN ASSISTANT

## 2021-06-27 PROCEDURE — 99232 SBSQ HOSP IP/OBS MODERATE 35: CPT | Performed by: NURSE PRACTITIONER

## 2021-06-27 PROCEDURE — 97530 THERAPEUTIC ACTIVITIES: CPT

## 2021-06-27 PROCEDURE — 97116 GAIT TRAINING THERAPY: CPT

## 2021-06-27 PROCEDURE — 97110 THERAPEUTIC EXERCISES: CPT

## 2021-06-27 RX ADMIN — ACETAMINOPHEN 650 MG: 325 TABLET ORAL at 18:14

## 2021-06-27 RX ADMIN — OXYCODONE HYDROCHLORIDE 10 MG: 10 TABLET ORAL at 09:31

## 2021-06-27 RX ADMIN — DOCUSATE SODIUM 100 MG: 100 CAPSULE, LIQUID FILLED ORAL at 18:14

## 2021-06-27 RX ADMIN — DOCUSATE SODIUM 100 MG: 100 CAPSULE, LIQUID FILLED ORAL at 09:09

## 2021-06-27 RX ADMIN — VERAPAMIL HYDROCHLORIDE 240 MG: 120 TABLET ORAL at 09:10

## 2021-06-27 RX ADMIN — ENOXAPARIN SODIUM 30 MG: 30 INJECTION SUBCUTANEOUS at 13:30

## 2021-06-27 RX ADMIN — FOLIC ACID TAB 400 MCG 400 MCG: 400 TAB at 09:09

## 2021-06-27 RX ADMIN — OXYCODONE HYDROCHLORIDE 10 MG: 10 TABLET ORAL at 22:16

## 2021-06-27 RX ADMIN — THIAMINE HCL TAB 100 MG 100 MG: 100 TAB at 09:09

## 2021-06-27 RX ADMIN — DIAZEPAM 5 MG: 5 TABLET ORAL at 21:50

## 2021-06-27 RX ADMIN — ENOXAPARIN SODIUM 30 MG: 30 INJECTION SUBCUTANEOUS at 01:24

## 2021-06-27 RX ADMIN — OXYCODONE HYDROCHLORIDE 10 MG: 10 TABLET ORAL at 05:10

## 2021-06-27 RX ADMIN — NICOTINE 21 MG: 21 PATCH, EXTENDED RELEASE TRANSDERMAL at 09:09

## 2021-06-27 RX ADMIN — OXYCODONE HYDROCHLORIDE 10 MG: 10 TABLET ORAL at 18:14

## 2021-06-27 RX ADMIN — ASPIRIN 162 MG: 81 TABLET, COATED ORAL at 09:09

## 2021-06-27 RX ADMIN — DULOXETINE HYDROCHLORIDE 60 MG: 30 CAPSULE, DELAYED RELEASE ORAL at 09:09

## 2021-06-27 RX ADMIN — METHOCARBAMOL 500 MG: 500 TABLET ORAL at 21:27

## 2021-06-27 RX ADMIN — CARISOPRODOL 350 MG: 350 TABLET ORAL at 09:09

## 2021-06-27 RX ADMIN — ACETAMINOPHEN 650 MG: 325 TABLET ORAL at 13:00

## 2021-06-27 RX ADMIN — ZOLPIDEM TARTRATE 5 MG: 5 TABLET, COATED ORAL at 21:50

## 2021-06-27 RX ADMIN — ACETAMINOPHEN 650 MG: 325 TABLET ORAL at 05:05

## 2021-06-27 RX ADMIN — OXYCODONE HYDROCHLORIDE 10 MG: 10 TABLET ORAL at 14:23

## 2021-06-27 NOTE — PROGRESS NOTES
Progress Note - Orthopedics   Shaw Ohara 60 y o  female MRN: [id number]  Unit/Bed#: -01      Subjective:    60 y  o female POD#6 right bimalleolar FX ORIF  Patient dramatically improved from yesterday  Patient denies any increased pain in her ankle currently  Patient denies any problems in her calf  The patient states she does not remember our encounter in the morning and does not remember a majority of the day yesterday  Patient denies any fevers any chills  Patient denies any dizziness lightheadedness  Patient denies any shortness of breath chest tightness chest pain  Patient denies any abdominal pain, nausea vomiting diarrhea  Patient denies any numbness or tingling in her extremities  Patient offers no other complaints at this time  Patient had a CT which was negative for acute pulmonary embolism  D-dimer positive        Labs:  0   Lab Value Date/Time    HCT 32 6 (L) 06/26/2021 1027    HCT 36 1 06/24/2021 0452    HCT 35 7 06/23/2021 0431    HGB 10 4 (L) 06/26/2021 1027    HGB 11 7 06/24/2021 0452    HGB 11 8 06/23/2021 0431    WBC 6 84 06/26/2021 1027    WBC 8 78 06/24/2021 0452    WBC 11 20 (H) 06/23/2021 0431       Meds:    Current Facility-Administered Medications:     acetaminophen (TYLENOL) tablet 650 mg, 650 mg, Oral, Q6H [address], Devonte Pulliam PA-C, 650 mg at 06/27/21 0505    aluminum-magnesium hydroxide-simethicone (MYLANTA) oral suspension 30 mL, 30 mL, Oral, Q6H PRN, Shira Catherine PA-C    aspirin (ECOTRIN LOW STRENGTH) EC tablet 162 mg, 162 mg, Oral, Daily, Lasha Barnes PA-C, 162 mg at 06/27/21 0909    diazepam (VALIUM) tablet 5 mg, 5 mg, Oral, HS, Lasha Barnes PA-C, 5 mg at 06/26/21 2310    docusate sodium (COLACE) capsule 100 mg, 100 mg, Oral, BID, Lasha Barnes PA-C, 100 mg at 06/27/21 0909    DULoxetine (CYMBALTA) delayed release capsule 60 mg, 60 mg, Oral, Daily, Lasha Barnes PA-C, 60 mg at 06/27/21 0909    enoxaparin (LOVENOX) subcutaneous injection 30 mg, 30 mg, Subcutaneous, Q12H [address], Lasha Barnes PA-C, 30 mg at 09/29/13 9738    folic acid (FOLVITE) tablet 400 mcg, 400 mcg, Oral, Daily, Mikie Bennett MD, 400 mcg at 06/27/21 2055    methocarbamol (ROBAXIN) tablet 500 mg, 500 mg, Oral, Q6H PRN, Brenton Mckeon PA-C, 500 mg at 06/24/21 2036    nicotine (NICODERM CQ) 21 mg/24 hr TD 24 hr patch 21 mg, 21 mg, Transdermal, Daily, Lasha Barnes PA-C, 21 mg at 06/27/21 0909    ondansetron (ZOFRAN) injection 4 mg, 4 mg, Intravenous, Q6H PRN, Billy Robles PA-C    oxyCODONE (ROXICODONE) immediate release tablet 10 mg, 10 mg, Oral, Q4H PRN, Billy Robles PA-C, 10 mg at 06/27/21 0931    oxyCODONE (ROXICODONE) IR tablet 5 mg, 5 mg, Oral, Q4H PRN, Billy Robles PA-C, 5 mg at 06/21/21 2341    thiamine tablet 100 mg, 100 mg, Oral, Daily, Mikie Bennett MD, 100 mg at 06/27/21 8690    verapamil (CALAN) tablet 240 mg, 240 mg, Oral, Daily, Mikie Bennett MD, 240 mg at 06/27/21 0910    zolpidem (AMBIEN) tablet 5 mg, 5 mg, Oral, HS, Adair Olivares PA-C, 5 mg at 06/26/21 2311    Blood Culture:   No results found for: BLOODCX    Wound Culture:   No results found for: WOUNDCULT    Ins and Outs:  I/O last 24 hours: In: 640 [P O :640]  Out: 400 [Urine:400]          Physical:  Vitals:    06/27/21 0823   BP: 127/72   Pulse: 78   Resp:    Temp: 98 1 °F (36 7 °C)   SpO2: 92%       Musculoskeletal: right Lower Extremity   · Patient resting in hospital bed in no acute distress  Able to answer any and all questions appropriately  · Skin :  Extremity appears to be well perfused overall  · Dressing  :  Dressing is clean dry intact with no visible soilage present  · TTP  :  Mild tenderness palpation noted over the lateral aspect of the ankle  No other bony or soft tissue tenderness palpation noted at this time  · SILT s/s/sp/dp/t  +fhl/ehl, +ankle dorsi/plantar flexion    2+ DP pulse      Assessment:    55 y o female POD#6 ORIF right bimalleolar ankle fracture, AMS       Plan:  · Non-weight bearing right lower extremity   ·  IVF/prbc as indicated   · PT/OT for ambulation assistance / gait training   · Pain control as directed   · DVT ppx (Lovenox 30mg BID x 6 weeks)   · Dispo:  Ortho will continue to follow  Continue nonweightbearing right lower extremity  Maintain splint at all times  Continue anticoagulation as directed  ortho stable at this time   Continue workup for AMS as per primary team        Rafael Meek PA-C

## 2021-06-27 NOTE — PLAN OF CARE
Problem: PHYSICAL THERAPY ADULT  Goal: Performs mobility at highest level of function for planned discharge setting  See evaluation for individualized goals  Description: Treatment/Interventions: Functional transfer training, LE strengthening/ROM, Therapeutic exercise, Endurance training, Patient/family training, Equipment eval/education, Bed mobility, Gait training, Spoke to nursing, Continued evaluation  Equipment Recommended: Obie Castleman (CHEYENNE)       See flowsheet documentation for full assessment, interventions and recommendations  6/27/2021 1452 by Lindsey Lockhart PT  Note: Prognosis: Good  Problem List: Decreased strength, Decreased endurance, Impaired balance, Decreased mobility, Orthopedic restrictions  Assessment: Pt seen for PT treatment session this date with interventions consisting of gait training w/ emphasis on improving pt's ability to ambulate level surfaces x 20' x2 with CGA provided by therapist with CHEYENNE, Therapeutic exercise consisting of: AROM 10 reps B LE in sitting position and therapeutic activity consisting of training: supine<>sit transfers, sit<>stand transfers, vc and tactile cues for static standing posture faciliation and toilet transfer  Pt agreeable to PT treatment session upon arrival, pt found supine in bed w/ HOB elevated, in no apparent distress and A&O x 4; motivated to participate  In comparison to previous session, pt with improvements in consistency c household distance gait tolerance, decreased A for transfers, initiation of LE exercises without worsened pain reported  Post session: pt returned back to recliner, chair alarm engaged, all needs in reach and RN notified of session findings/recommendations  Continue to recommend home with home health rehabilitation at time of d/c in order to maximize pt's functional independence and safety w/ mobility  Pt continues to be functioning below baseline level, and remains limited 2* factors listed above   PT will continue to see pt during current hospitalization in order to address the deficits listed above and provide interventions consistent w/ POC in effort to achieve STGs  Barriers to Discharge: None  Barriers to Discharge Comments: pt does not have stairs, family installed ramp yesterday     PT Discharge Recommendation: Home with home health rehabilitation     PT - OK to Discharge: Yes (when medically cleared)    See flowsheet documentation for full assessment  6/27/2021 1452 by Dean Marroquin PT  Note: Prognosis: Good  Problem List: Decreased strength, Decreased endurance, Impaired balance, Decreased mobility, Orthopedic restrictions  Assessment: Pt seen for PT treatment session this date with interventions consisting of gait training w/ emphasis on improving pt's ability to ambulate level surfaces x 20' x2 with CGA provided by therapist with RW, Therapeutic exercise consisting of: AROM 10 reps B LE in sitting position and therapeutic activity consisting of training: supine<>sit transfers, sit<>stand transfers, vc and tactile cues for static standing posture faciliation and toilet transfer  Pt agreeable to PT treatment session upon arrival, pt found supine in bed w/ HOB elevated, in no apparent distress and A&O x 4; motivated to participate  In comparison to previous session, pt with improvements in consistency c household distance gait tolerance, decreased A for transfers, initiation of LE exercises without worsened pain reported  Post session: pt returned back to recliner, chair alarm engaged, all needs in reach and RN notified of session findings/recommendations  Continue to recommend home with home health rehabilitation at time of d/c in order to maximize pt's functional independence and safety w/ mobility  Pt continues to be functioning below baseline level, and remains limited 2* factors listed above   PT will continue to see pt during current hospitalization in order to address the deficits listed above and provide interventions consistent w/ POC in effort to achieve STGs  Barriers to Discharge: None  Barriers to Discharge Comments: pt does not have stairs, family installed ramp yesterday     PT Discharge Recommendation: Home with home health rehabilitation     PT - OK to Discharge: Yes (when medically cleared)    See flowsheet documentation for full assessment  6/27/2021 1452 by Simon Krishnan PT  Note: Prognosis: Good  Problem List: Decreased strength, Decreased endurance, Impaired balance, Decreased mobility, Orthopedic restrictions  Assessment: Pt seen for PT treatment session this date with interventions consisting of gait training w/ emphasis on improving pt's ability to ambulate level surfaces x 20' x2 with CGA provided by therapist with RW, Therapeutic exercise consisting of: AROM 10 reps B LE in sitting position and therapeutic activity consisting of training: supine<>sit transfers, sit<>stand transfers, vc and tactile cues for static standing posture faciliation and toilet transfer  Pt agreeable to PT treatment session upon arrival, pt found supine in bed w/ HOB elevated, in no apparent distress and A&O x 4; motivated to participate  In comparison to previous session, pt with improvements in consistency c household distance gait tolerance, decreased A for transfers, initiation of LE exercises without worsened pain reported  Post session: pt returned back to recliner, chair alarm engaged, all needs in reach and RN notified of session findings/recommendations  Continue to recommend home with home health rehabilitation at time of d/c in order to maximize pt's functional independence and safety w/ mobility  Pt continues to be functioning below baseline level, and remains limited 2* factors listed above  PT will continue to see pt during current hospitalization in order to address the deficits listed above and provide interventions consistent w/ POC in effort to achieve STGs    Barriers to Discharge: None  Barriers to Discharge Comments: pt does not have stairs, family installed ramp yesterday     PT Discharge Recommendation: Home with home health rehabilitation     PT - OK to Discharge: Yes (when medically cleared)    See flowsheet documentation for full assessment

## 2021-06-27 NOTE — PHYSICAL THERAPY NOTE
Physical Therapy Treatment Note         06/27/21 1330   PT Last Visit   PT Visit Date 06/27/21   Note Type   Note Type Treatment   Pain Assessment   Pain Assessment Tool 0-10   Pain Score 8   Pain Location/Orientation Orientation: Right;Location: Leg  (ankle)   Hospital Pain Intervention(s)   (RN aware of pt's pain report)   Restrictions/Precautions   Weight Bearing Precautions Per Order Yes   RLE Weight Bearing Per Order NWB   Braces or Orthoses Splint  (post op)   Other Precautions Chair Alarm; Bed Alarm;WBS;Fall Risk;Seizure   General   Chart Reviewed Yes   Response to Previous Treatment Patient with no complaints from previous session  Family/Caregiver Present No   Cognition   Overall Cognitive Status WFL   Arousal/Participation Alert; Responsive; Cooperative   Attention Within functional limits   Orientation Level Oriented X4   Memory Within functional limits   Following Commands Follows all commands and directions without difficulty   Comments pt agreeable to PT session   Subjective   Subjective "It feels so good to get out of this bed"   Bed Mobility   Supine to Sit 5  Supervision   Additional items Assist x 1;HOB elevated; Bedrails; Increased time required;Verbal cues   Transfers   Sit to Stand 4  Minimal assistance  (CGA)   Additional items Assist x 1; Armrests; Increased time required;Verbal cues   Stand to Sit 4  Minimal assistance  (CGA)   Additional items Assist x 1; Armrests; Increased time required;Verbal cues   Toilet transfer 4  Minimal assistance  (CGA)   Additional items Assist x 1; Increased time required;Verbal cues; Commode  (BSC frame over standard toilet)   Additional Comments pt able to maintain NWB R % of the time, vc for appropraite hand/foot placement 50% of the time   Ambulation/Elevation   Gait pattern Decreased foot clearance; Excessively slow  (gentle hops LLE, pt maintains NWB % of the time)   Gait Assistance 4  Minimal assist   Additional items Assist x 1;Verbal cues   Assistive Device Rolling walker   Distance 20' x2   Stair Management Assistance Not tested   Balance   Static Sitting Good   Dynamic Sitting Fair +   Static Standing Fair   Dynamic Standing Fair -   Ambulatory Poor +   Endurance Deficit   Endurance Deficit Yes   Activity Tolerance   Activity Tolerance Patient limited by pain   Medical Staff Made Aware TAD Rios   Nurse Made Aware LUCHO Ball made aware of session outcomes   Exercises   Quad Sets Sitting;10 reps;AROM; Bilateral   Glute Sets Sitting;10 reps;AROM; Bilateral   Hip Flexion Sitting;10 reps;AROM; Bilateral   Hip Abduction Sitting;10 reps;AROM; Bilateral   Knee AROM Long Arc Quad Sitting;10 reps;AROM; Bilateral   Marching Sitting;10 reps;AROM; Bilateral   Assessment   Prognosis Good   Problem List Decreased strength;Decreased endurance; Impaired balance;Decreased mobility;Orthopedic restrictions   Assessment Pt seen for PT treatment session this date with interventions consisting of gait training w/ emphasis on improving pt's ability to ambulate level surfaces x 20' x2 with CGA provided by therapist with RW, Therapeutic exercise consisting of: AROM 10 reps B LE in sitting position and therapeutic activity consisting of training: supine<>sit transfers, sit<>stand transfers, vc and tactile cues for static standing posture faciliation and toilet transfer  Pt agreeable to PT treatment session upon arrival, pt found supine in bed w/ HOB elevated, in no apparent distress and A&O x 4; motivated to participate  In comparison to previous session, pt with improvements in consistency c household distance gait tolerance, decreased A for transfers, initiation of LE exercises without worsened pain reported  Post session: pt returned back to recliner, chair alarm engaged, all needs in reach and RN notified of session findings/recommendations  Continue to recommend home with home health rehabilitation at time of d/c in order to maximize pt's functional independence and safety w/ mobility   Pt continues to be functioning below baseline level, and remains limited 2* factors listed above  PT will continue to see pt during current hospitalization in order to address the deficits listed above and provide interventions consistent w/ POC in effort to achieve STGs  Barriers to Discharge None   Barriers to Discharge Comments pt does not have stairs, family installed ramp yesterday   Goals   Patient Goals to return home   STG Expiration Date 07/05/21   PT Treatment Day 1   Plan   Treatment/Interventions Functional transfer training;LE strengthening/ROM; Therapeutic exercise; Endurance training;Patient/family training;Equipment eval/education; Bed mobility;Gait training;Spoke to nursing;Continued evaluation   Progress Progressing toward goals   PT Frequency 5x/wk; Weekend  (+1x/weekend)   Recommendation   PT Discharge Recommendation Home with home health rehabilitation   Equipment Recommended Walker  (RW)   Karla Frost walker   Change/add to [facility]?  No   PT - OK to Discharge Yes  (when medically cleared)   AM-PAC Basic Mobility Inpatient   Turning in Bed Without Bedrails 4   Lying on Back to Sitting on Edge of Flat Bed 4   Moving Bed to Chair 3   Standing Up From Chair 3   Walk in Room 3   Climb 3-5 Stairs 2   Basic Mobility Inpatient Raw Score 19   Basic Mobility Standardized Score 42 48     Gerda Santamaria, PT, DPT

## 2021-06-27 NOTE — PROGRESS NOTES
Progress Note - Neurology   Mamta Brock 60 y o  female MRN: [id number]  Unit/Bed#: -01 Encounter: [id number]      Subjective:   Patient seen at bedside seems alert awake, with significant improvement in mental status, and dysarthria  She denies any headaches or any other neurological symptoms at this time  Patient does not recall the events of yesterday, but on discussing with the orthopedic team patient was noted to be in bed with tonic posturing, and altered mental status suspicious for a possible seizure  Of note patient has been on Soma the dosage of which was lowered yesterday with significant clinical improvement  ROS: 12 system cued query was unchanged from org  consult note  Vitals:   Vitals:    06/27/21 0823   BP: 127/72   Pulse: 78   Resp:    Temp: 98 1 °F (36 7 °C)   SpO2: 92%   ,Body mass index is 28 04 kg/m²      MEDS:    Current Facility-Administered Medications:     acetaminophen (TYLENOL) tablet 650 mg, 650 mg, Oral, Q6H [address], Nini Mojica PA-C, 650 mg at 06/27/21 0505    aluminum-magnesium hydroxide-simethicone (MYLANTA) oral suspension 30 mL, 30 mL, Oral, Q6H PRN, Niraj Portillo PA-C    aspirin (ECOTRIN LOW STRENGTH) EC tablet 162 mg, 162 mg, Oral, Daily, Niraj Portillo PA-C, 162 mg at 06/27/21 0909    diazepam (VALIUM) tablet 5 mg, 5 mg, Oral, HS, Lasha Barnes PA-C, 5 mg at 06/26/21 2310    docusate sodium (COLACE) capsule 100 mg, 100 mg, Oral, BID, Niraj Portillo PA-C, 100 mg at 06/27/21 0909    DULoxetine (CYMBALTA) delayed release capsule 60 mg, 60 mg, Oral, Daily, Lasha Barnes PA-C, 60 mg at 06/27/21 0909    enoxaparin (LOVENOX) subcutaneous injection 30 mg, 30 mg, Subcutaneous, Q12H [address], Lasha Barnes PA-C, 30 mg at 86/69/37 3989    folic acid (FOLVITE) tablet 400 mcg, 400 mcg, Oral, Daily, Padma Osuna MD, 400 mcg at 06/27/21 0909    methocarbamol (ROBAXIN) tablet 500 mg, 500 mg, Oral, Q6H PRN, Gladys Hawley PA-C, 500 mg at 06/24/21 2036    nicotine (NICODERM CQ) 21 mg/24 hr TD 24 hr patch 21 mg, 21 mg, Transdermal, Daily, Lasha Barnes PA-C, 21 mg at 06/27/21 0909    ondansetron (ZOFRAN) injection 4 mg, 4 mg, Intravenous, Q6H PRN, Sena Jones PA-C    oxyCODONE (ROXICODONE) immediate release tablet 10 mg, 10 mg, Oral, Q4H PRN, Sena Jones PA-C, 10 mg at 06/27/21 0931    oxyCODONE (ROXICODONE) IR tablet 5 mg, 5 mg, Oral, Q4H PRN, Sena Jones PA-C, 5 mg at 06/21/21 2341    thiamine tablet 100 mg, 100 mg, Oral, Daily, Alvaro Jennings MD, 100 mg at 06/27/21 4912    verapamil (CALAN) tablet 240 mg, 240 mg, Oral, Daily, Alvaro Jennings MD, 240 mg at 06/27/21 0910    zolpidem (AMBIEN) tablet 5 mg, 5 mg, Oral, HS, Kelsie Engle PA-C, 5 mg at 06/26/21 2311    Physical Exam:  General appearance: alert, appears stated age and cooperative  Head: Normocephalic, without obvious abnormality, atraumatic    Neurologic:  On examination patient is alert awake oriented, speech is fluent, cranial nerves 2-12 intact, and no focal deficits were noted on motor and sensory exam although there is limited strength testing in the left lower extremity  No dysmetria was noted, and patient's mental status is back to her baseline  Lab Results: I have personally reviewed pertinent reports  Imaging Studies: I have personally reviewed pertinent reports  Assessment:  1  Toxic encephalopathy, suspect seizure episode witnessed by ortho team     Plan: Will discontinue Soma at this time, patient recently started on Robaxin as muscle relaxer, also dosage of Ambien was lowered and will request an EEG at this time  Will follow up this patient with you after EEG is completed  6/27/2021,10:25 AM    Dictation voice to text software has been used in the creation of this document  Please consider this in light of any contextual or grammatical errors

## 2021-06-27 NOTE — QUICK NOTE
D-Dimer elevated (unclear if related to post-op status) she has hx of DVT and malignancy  She is on several meds that can cause her to be more sedated then usual but given her hx will check CTA chest, r/o PE  Nursing asked to push with patient I/S  Will follow up after CTA completed

## 2021-06-27 NOTE — PROGRESS NOTES
[facility]  Progress Note - Corwin Ramirez 1965, 60 y o  female MRN: [id number]  Unit/Bed#: -01 Encounter: [id number]  Primary Care Provider: No primary care provider on file  Date and time admitted to hospital: 6/21/2021  9:54 AM    Acute encephalopathy  Assessment & Plan  Patient seems to be confused and disoriented at times on 06/26/2021 morning  Neurological exam otherwise nonfocal so far  Potential etiologies would include acute metabolic encephalopathy, medication related encephalopathy (patient is on carisoprodol, diazepam, duloxetine, zolpidem, PRN methocarbamol and PRN oxycodone)  checking VBG, TSH, B12, ammonia levels  Can start empirically on thiamine and folate supplementation  Neurology following    Myoclonus  Assessment & Plan  Noted in upper extremities, also on face at times  Neurology consulted recommending-Suspected to be secondary to medication effect  - Decrease Soma to 350 bid (down from qid)  - Decrease Ambien dose to 5mg  - IVF hydration/PO hydration    Smoking  Assessment & Plan  Patient would benefit from smoking cessation, this was discussed and it will be reinforced throughout the hospitalization    HTN (hypertension)  Assessment & Plan  Chronically on verapamil which is being continued initially during hospitalization  Initially hypotensive  Verapamil dose come from 360 mg to 240 mg continue at this dose for now    * Bimalleolar fracture of right ankle  Assessment & Plan  Underwent ORIF 6/21/2021  Orthopedics is primary service, postoperative management by them         VTE Pharmacologic Prophylaxis:   Pharmacologic: Enoxaparin (Lovenox)  Mechanical VTE Prophylaxis in Place: Yes    Patient Centered Rounds: I have performed bedside rounds with nursing staff today      Discussions with Specialists or Other Care Team Provider:  Reviewed previous provider's notes reviewed neurology notes and reviewed Ortho notes    Education and Discussions with Family / Patient:  Not available at this time    Time Spent for Care: 20 minutes  More than 50% of total time spent on counseling and coordination of care as described above  Current Length of Stay: 6 day(s)    Current Patient Status: Inpatient   Certification Statement: The patient will continue to require additional inpatient hospital stay due to Monitoring of mentation    Discharge Plan / Estimated Discharge Date:  Report of 48 hours pending improvement of mentation      Code Status: Level 1 - Full Code      Subjective:   Patient alert oriented x3 she notes that she was confused but she does not remember the actual event she feels that she Dr Meagan Pena mentation she offers no complaints at this time    Objective:     Vitals:   Temp (24hrs), Av °F (36 7 °C), Min:97 8 °F (36 6 °C), Max:98 1 °F (36 7 °C)    Temp:  [97 8 °F (36 6 °C)-98 1 °F (36 7 °C)] 98 1 °F (36 7 °C)  HR:  [78-80] 78  Resp:  [18-20] 18  BP: ()/(56-79) 127/72  SpO2:  [89 %-95 %] 92 %  Body mass index is 28 04 kg/m²  Input and Output Summary (last 24 hours): Intake/Output Summary (Last 24 hours) at 2021 0956  Last data filed at 2021 0501  Gross per 24 hour   Intake 640 ml   Output 400 ml   Net 240 ml       Physical Exam:     Physical Exam  Vitals and nursing note reviewed  Constitutional:       General: She is not in acute distress  Appearance: She is not ill-appearing  Cardiovascular:      Rate and Rhythm: Normal rate  Pulses: Normal pulses  Pulmonary:      Effort: Pulmonary effort is normal    Abdominal:      Palpations: Abdomen is soft  Musculoskeletal:         General: Signs of injury present  Normal range of motion  Skin:     General: Skin is warm and dry  Neurological:      General: No focal deficit present  Mental Status: She is alert and oriented to person, place, and time  Mental status is at baseline     Psychiatric:         Mood and Affect: Mood normal          Thought Content: Thought content normal          Judgment: Judgment normal      Additional Data:     Labs:    Results from last 7 days   Lab Units 06/26/21  1027   WBC Thousand/uL 6 84   HEMOGLOBIN g/dL 10 4*   HEMATOCRIT % 32 6*   PLATELETS Thousands/uL 347   NEUTROS PCT % 50   LYMPHS PCT % 37   MONOS PCT % 9   EOS PCT % 3     Results from last 7 days   Lab Units 06/26/21  1027   POTASSIUM mmol/L 4 2   CHLORIDE mmol/L 101   CO2 mmol/L 30   BUN mg/dL 14   CREATININE mg/dL 0 79   CALCIUM mg/dL 9 0   ALK PHOS U/L 71   ALT U/L 13   AST U/L 9           * I Have Reviewed All Lab Data Listed Above  * Additional Pertinent Lab Tests Reviewed:  Megha 66 Admission Reviewed    Recent Cultures (last 7 days):           Last 24 Hours Medication List:   Current Facility-Administered Medications   Medication Dose Route Frequency Provider Last Rate    acetaminophen  650 mg Oral Q6H [address] Manju Faustin PA-C      aluminum-magnesium hydroxide-simethicone  30 mL Oral Q6H PRN Carolin Rupa, TAD      aspirin  162 mg Oral Daily Carolin Rupa, TAD      diazepam  5 mg Oral HS Lasha Barnes PA-C      docusate sodium  100 mg Oral BID Carolin Rupa, TAD      DULoxetine  60 mg Oral Daily Carolin Rupa, TAD      enoxaparin  30 mg Subcutaneous Q12H [address] [place], [place]      folic acid  597 mcg Oral Daily Felipa Gallagher MD      methocarbamol  500 mg Oral Q6H PRN Khalif Lemon PA-C      nicotine  21 mg Transdermal Daily Carolin Rupa, TAD      ondansetron  4 mg Intravenous Q6H PRN Carolin Rupa, PA-LISA      oxyCODONE  10 mg Oral Q4H PRN Carolin Rupa, TAD      oxyCODONE  5 mg Oral Q4H PRN Carolin Rupa, TAD      thiamine  100 mg Oral Daily Felipa Gallagher MD      verapamil  240 mg Oral Daily Felipa Gallagher MD      zolpidem  5 mg Oral HS Margie Cockayne, PA-C          Today, Patient Was Seen By: ALEKSANDER Cornejo    ** Please Note: Dragon 360 Dictation voice to text software may have been used in the creation of this document   **

## 2021-06-28 ENCOUNTER — TELEPHONE (OUTPATIENT)
Dept: OBGYN CLINIC | Facility: HOSPITAL | Age: 56
End: 2021-06-28

## 2021-06-28 ENCOUNTER — APPOINTMENT (INPATIENT)
Dept: NEUROLOGY | Facility: HOSPITAL | Age: 56
DRG: 492 | End: 2021-06-28
Attending: PSYCHIATRY & NEUROLOGY
Payer: MEDICARE

## 2021-06-28 VITALS
SYSTOLIC BLOOD PRESSURE: 120 MMHG | RESPIRATION RATE: 16 BRPM | HEIGHT: 67 IN | BODY MASS INDEX: 28.09 KG/M2 | WEIGHT: 179 LBS | DIASTOLIC BLOOD PRESSURE: 61 MMHG | TEMPERATURE: 97.9 F | HEART RATE: 83 BPM | OXYGEN SATURATION: 91 %

## 2021-06-28 LAB
ANION GAP SERPL CALCULATED.3IONS-SCNC: 9 MMOL/L (ref 4–13)
BUN SERPL-MCNC: 11 MG/DL (ref 5–25)
CALCIUM SERPL-MCNC: 8.9 MG/DL (ref 8.3–10.1)
CHLORIDE SERPL-SCNC: 103 MMOL/L (ref 100–108)
CO2 SERPL-SCNC: 26 MMOL/L (ref 21–32)
CREAT SERPL-MCNC: 0.66 MG/DL (ref 0.6–1.3)
ERYTHROCYTE [DISTWIDTH] IN BLOOD BY AUTOMATED COUNT: 12.6 % (ref 11.6–15.1)
GFR SERPL CREATININE-BSD FRML MDRD: 98 ML/MIN/1.73SQ M
GLUCOSE SERPL-MCNC: 99 MG/DL (ref 65–140)
HCT VFR BLD AUTO: 32.1 % (ref 34.8–46.1)
HGB BLD-MCNC: 10.4 G/DL (ref 11.5–15.4)
MCH RBC QN AUTO: 32.5 PG (ref 26.8–34.3)
MCHC RBC AUTO-ENTMCNC: 32.4 G/DL (ref 31.4–37.4)
MCV RBC AUTO: 100 FL (ref 82–98)
PLATELET # BLD AUTO: 355 THOUSANDS/UL (ref 149–390)
PMV BLD AUTO: 9 FL (ref 8.9–12.7)
POTASSIUM SERPL-SCNC: 3.7 MMOL/L (ref 3.5–5.3)
RBC # BLD AUTO: 3.2 MILLION/UL (ref 3.81–5.12)
SODIUM SERPL-SCNC: 138 MMOL/L (ref 136–145)
WBC # BLD AUTO: 5.57 THOUSAND/UL (ref 4.31–10.16)

## 2021-06-28 PROCEDURE — 95816 EEG AWAKE AND DROWSY: CPT

## 2021-06-28 PROCEDURE — 95819 EEG AWAKE AND ASLEEP: CPT | Performed by: STUDENT IN AN ORGANIZED HEALTH CARE EDUCATION/TRAINING PROGRAM

## 2021-06-28 PROCEDURE — 80048 BASIC METABOLIC PNL TOTAL CA: CPT | Performed by: NURSE PRACTITIONER

## 2021-06-28 PROCEDURE — NC001 PR NO CHARGE: Performed by: PHYSICIAN ASSISTANT

## 2021-06-28 PROCEDURE — 85027 COMPLETE CBC AUTOMATED: CPT | Performed by: NURSE PRACTITIONER

## 2021-06-28 PROCEDURE — 99024 POSTOP FOLLOW-UP VISIT: CPT | Performed by: PHYSICIAN ASSISTANT

## 2021-06-28 PROCEDURE — 99232 SBSQ HOSP IP/OBS MODERATE 35: CPT | Performed by: PHYSICIAN ASSISTANT

## 2021-06-28 RX ORDER — ASPIRIN 81 MG/1
162 TABLET ORAL DAILY
Qty: 35 TABLET | Refills: 0 | Status: SHIPPED | OUTPATIENT
Start: 2021-06-29

## 2021-06-28 RX ORDER — OXYCODONE HYDROCHLORIDE 5 MG/1
5 TABLET ORAL EVERY 4 HOURS PRN
Qty: 30 TABLET | Refills: 0 | Status: SHIPPED | OUTPATIENT
Start: 2021-06-28 | End: 2021-07-08

## 2021-06-28 RX ADMIN — NICOTINE 21 MG: 21 PATCH, EXTENDED RELEASE TRANSDERMAL at 09:15

## 2021-06-28 RX ADMIN — DULOXETINE HYDROCHLORIDE 60 MG: 30 CAPSULE, DELAYED RELEASE ORAL at 09:14

## 2021-06-28 RX ADMIN — ACETAMINOPHEN 650 MG: 325 TABLET ORAL at 13:00

## 2021-06-28 RX ADMIN — ENOXAPARIN SODIUM 30 MG: 30 INJECTION SUBCUTANEOUS at 13:36

## 2021-06-28 RX ADMIN — DOCUSATE SODIUM 100 MG: 100 CAPSULE, LIQUID FILLED ORAL at 09:15

## 2021-06-28 RX ADMIN — VERAPAMIL HYDROCHLORIDE 240 MG: 120 TABLET ORAL at 09:14

## 2021-06-28 RX ADMIN — FOLIC ACID TAB 400 MCG 400 MCG: 400 TAB at 09:14

## 2021-06-28 RX ADMIN — ACETAMINOPHEN 650 MG: 325 TABLET ORAL at 05:27

## 2021-06-28 RX ADMIN — ASPIRIN 162 MG: 81 TABLET, COATED ORAL at 09:15

## 2021-06-28 RX ADMIN — THIAMINE HCL TAB 100 MG 100 MG: 100 TAB at 09:14

## 2021-06-28 RX ADMIN — OXYCODONE HYDROCHLORIDE 10 MG: 10 TABLET ORAL at 09:15

## 2021-06-28 RX ADMIN — OXYCODONE HYDROCHLORIDE 10 MG: 10 TABLET ORAL at 13:36

## 2021-06-28 RX ADMIN — ENOXAPARIN SODIUM 30 MG: 30 INJECTION SUBCUTANEOUS at 00:33

## 2021-06-28 RX ADMIN — ACETAMINOPHEN 650 MG: 325 TABLET ORAL at 00:33

## 2021-06-28 NOTE — TELEPHONE ENCOUNTER
PO Dr Mari Mack  RE: when returning home  CB# [phone]     asked to speak to Dr Elise Van directly    would like to know when he should expect wife to be d/c from hospital

## 2021-06-28 NOTE — PROGRESS NOTES
Progress Note - Orthopedics   Sahara Brock 60 y o  female MRN: [id number]  Unit/Bed#: [facility]      Subjective:    60 y  o female POD#7 right bimalleolar FX ORIF  Patient states that her ankle is doing well overall  Patient has mild pain over the medial and lateral ankle  Patient denies any numbness or tingling in the leg  Patient denies any fevers or chills, patient denies any dizziness or lightheadedness, Patien denies any SOB, CP  Patient has been compliant with anticoagulation therapy  Patient has been compliant with weight bearing restrictions  Patient offers no other complaints at this time       Labs:  0   Lab Value Date/Time    HCT 32 1 (L) 06/28/2021 0528    HCT 32 6 (L) 06/26/2021 1027    HCT 36 1 06/24/2021 0452    HGB 10 4 (L) 06/28/2021 0528    HGB 10 4 (L) 06/26/2021 1027    HGB 11 7 06/24/2021 0452    WBC 5 57 06/28/2021 0528    WBC 6 84 06/26/2021 1027    WBC 8 78 06/24/2021 0452       Meds:    Current Facility-Administered Medications:     acetaminophen (TYLENOL) tablet 650 mg, 650 mg, Oral, Q6H [facility], Devonte Pulliam PA-C, 650 mg at 06/28/21 0527    aluminum-magnesium hydroxide-simethicone (MYLANTA) oral suspension 30 mL, 30 mL, Oral, Q6H PRN, Shira Catherine PA-C    aspirin (ECOTRIN LOW STRENGTH) EC tablet 162 mg, 162 mg, Oral, Daily, Lasha Barnes PA-C, 162 mg at 06/28/21 0915    diazepam (VALIUM) tablet 5 mg, 5 mg, Oral, HS, Lasha Barnes PA-C, 5 mg at 06/27/21 2150    docusate sodium (COLACE) capsule 100 mg, 100 mg, Oral, BID, Lasha Barnes PA-C, 100 mg at 06/28/21 0915    DULoxetine (CYMBALTA) delayed release capsule 60 mg, 60 mg, Oral, Daily, Lasha Barnes PA-C, 60 mg at 06/28/21 0914    enoxaparin (LOVENOX) subcutaneous injection 30 mg, 30 mg, Subcutaneous, Q12H [facility], Lasha Barnes PA-C, 30 mg at 70/66/52 8572    folic acid (FOLVITE) tablet 400 mcg, 400 mcg, Oral, Daily, Gonzales Luque MD, 400 mcg at 06/28/21 0914    methocarbamol (ROBAXIN) tablet 500 mg, 500 mg, Oral, Q6H PRN, Lauryn Mckeon PA-C, 500 mg at 06/27/21 2127    nicotine (NICODERM CQ) 21 mg/24 hr TD 24 hr patch 21 mg, 21 mg, Transdermal, Daily, Lasha Barnes PA-C, 21 mg at 06/28/21 0915    ondansetron (ZOFRAN) injection 4 mg, 4 mg, Intravenous, Q6H PRN, Celeste Mckeon PA-C    oxyCODONE (ROXICODONE) immediate release tablet 10 mg, 10 mg, Oral, Q4H PRN, Celeste Mckeon PA-C, 10 mg at 06/28/21 0915    oxyCODONE (ROXICODONE) IR tablet 5 mg, 5 mg, Oral, Q4H PRN, Celeste Mckeon PA-C, 5 mg at 06/21/21 2341    thiamine tablet 100 mg, 100 mg, Oral, Daily, Gilbert Spence MD, 100 mg at 06/28/21 0914    verapamil (CALAN) tablet 240 mg, 240 mg, Oral, Daily, Gilbert Spence MD, 240 mg at 06/28/21 0914    zolpidem (AMBIEN) tablet 5 mg, 5 mg, Oral, HS, Ford Nissen, PA-C, 5 mg at 06/27/21 2150    Blood Culture:   No results found for: BLOODCX    Wound Culture:   No results found for: WOUNDCULT    Ins and Outs:  I/O last 24 hours: In: -   Out: 250 [Urine:250]          Physical:  Vitals:    06/28/21 0710   BP: 120/61   Pulse: 83   Resp: 16   Temp: 97 9 °F (36 6 °C)   SpO2: 91%     Musculoskeletal: right Lower Extremity  · Patient resting in hospital bed in no acute distress  Able to answer any and all questions appropriately  · Skin :  Extremity appears to be well perfused overall  · Dressing  :  Dressing is clean dry intact with no visible soilage present  · TTP  :  Mild tenderness palpation noted over the lateral aspect of the ankle   No other bony or soft tissue tenderness palpation noted at this time  · SILT s/s/sp/dp/t  +fhl/ehl, +ankle dorsi/plantar flexion   2+ DP pulse      Assessment:    60 y  o female POD#7 right bimalleolar FX ORIF, AMS       Plan:  · Continue Non-weight bearing right lower extremity   ·  IVF/prbc as indicated   · PT/OT for ambulation assistance / gait training   · Pain control as directed   · DVT ppx (Lovenox 30mg BID x 6 Lev Paul  · Jeff Parekh will continue to follow   Continue nonweightbearing right lower extremity   Maintain splint at all times   Continue anticoagulation as directed   ortho stable at this time  Continue workup for AMS as per primary team  Discharge pending neurology and physical therapy       Rusty Bee PA-C

## 2021-06-28 NOTE — TELEPHONE ENCOUNTER
I spoke to pharmacist and it is coming from patient's neurologist Percocet 7 5mg/325mg    Please advise

## 2021-06-28 NOTE — TELEPHONE ENCOUNTER
Dr Kirby Douglas from [employer] is calling  Patient is currently on Percocet, do you still want her to take Oxycodone? Please advise      CB# [phone]

## 2021-06-28 NOTE — ASSESSMENT & PLAN NOTE
· Patient seems to be confused and disoriented at times since 06/26/2021   · Neurological exam otherwise nonfocal so far    · Potential etiologies would include acute metabolic encephalopathy, medication related encephalopathy (patient is on carisoprodol, diazepam, duloxetine, zolpidem, PRN methocarbamol and PRN oxycodone)  · VBG unremarkable, TSH normal, ammonia normal, B12 pending   · Ddimer elevated, likely post-op, CTA chest is negative for PE  · Neurology following, EEG pending today 6/28

## 2021-06-28 NOTE — ASSESSMENT & PLAN NOTE
· Noted in upper extremities, also on face at times    · Neurology consulted recommending - suspected to be secondary to medication effect  · Discontinued Soma 6/27  · Decreased Ambien 5mg 6/26

## 2021-06-28 NOTE — DISCHARGE SUMMARY
Discharge Summary - Rebekah Brock 60 y o  female MRN: [id number]  Unit/Bed#: [facility] EEG    Attending Physician: Dr Jaun Alves     Admitting diagnosis: Closed bimalleolar fracture of right ankle, initial encounter [S82 681A]    Discharge diagnosis: Closed bimalleolar fracture of right ankle, initial encounter [S84 341A]    Date of admission: 6/21/2021    Date of discharge: 06/28/21    Procedure: Open reduction internal fixation right ankle bimalleolar fracture     HPI: 60 y o  female with a history of a right ankle injury who has been seen by Dr Nolon Bamberger in clinic  Pt has failed previous non operative therapies and was scheduled for 6/21/21  Prior to surgery the risks and benefits of surgery were explained and informed consent was obtained  Hospital course: Pt was taken to the OR on 6/21/21  Surgery went without complications and pt was discharged to the PACU in a stable condition and was transferred to the floor  POD#1 - POD#4, Patient improved daily in regards to pain control and overall symptoms  The morning of POD#, patient had an episode of Altered mental status which resulted in an internal medicine consult and neurology consult  All diagnostic imaging and labwork non-contributory and patient made full recovery with medicine adjustment on POD#6  On discharge date pt was cleared by PT and the medicine team and determined to be safe for discharge  Daily discussion was had with the patient, nursing staff, orthopaedic team, and family members if present  All questions were answered to the patients satisifaction        0   Lab Value Date/Time    HGB 10 4 (L) 06/28/2021 0528    HGB 10 4 (L) 06/26/2021 1027    HGB 11 7 06/24/2021 0452    HGB 11 8 06/23/2021 0431    HGB 10 8 (L) 06/22/2021 0506    HGB 12 6 06/21/2021 1034    HGB 12 2 12/22/2019 1751        Discharge Instructions:   · Non-weight bearing right lower extremity   · Keep dressings clean and dry at all times Please keep splint dry at all times  Contact office if  splint becomes wet or damaged  · Complete DVT prophylaxis as prescribed (Aspirin 81mg twice daily x 6 weeks)  · Physical therapy   · Ice and elevate ankle above the level of the heart   · Body mass index is 28 04 kg/m²  mildly obese  Recommend behavior modifications, nutrition and physical activity  · Follow-up as scheduled, otherwise call for appt  Post op 2 weeks       Discharge Medications: For the complete list of discharge medications, please refer to the patient's medication reconciliation

## 2021-06-28 NOTE — ASSESSMENT & PLAN NOTE
· Patient would benefit from smoking cessation, this was discussed and it will be reinforced throughout the hospitalization  · CTA showing emphysematous changes

## 2021-06-28 NOTE — ASSESSMENT & PLAN NOTE
· Chronically on verapamil, initially hypotensive  · Verapamil dose decreased from 360 mg to 240 mg and will continue at this dose for now  /61 (BP Location: Right arm)   Pulse 83   Temp 97 9 °F (36 6 °C) (Oral)   Resp 16   Ht 5' 7" (1 702 m)   Wt 81 2 kg (179 lb)   LMP  (LMP Unknown)   SpO2 91%   BMI 28 04 kg/m²

## 2021-06-28 NOTE — TELEPHONE ENCOUNTER
Patient called to speak to Dr Radha Aparicio about when she is going to be discharged  Please call her cell [phone]    Thank you

## 2021-06-28 NOTE — PROGRESS NOTES
[facility] Progress Note - Ana Dopp 1965, 60 y o  female MRN: [id number]  Unit/Bed#: MS Mireya-Gay Encounter: [id number]  Primary Care Provider: No primary care provider on file  Date and time admitted to hospital: 6/21/2021  9:54 AM    * Bimalleolar fracture of right ankle  Assessment & Plan  · Underwent ORIF 6/21/2021  · Orthopedics is primary service, postoperative management by them    Acute encephalopathy  Assessment & Plan  · Patient seems to be confused and disoriented at times since 06/26/2021   · Neurological exam otherwise nonfocal so far  · Potential etiologies would include acute metabolic encephalopathy, medication related encephalopathy (patient is on carisoprodol, diazepam, duloxetine, zolpidem, PRN methocarbamol and PRN oxycodone)  · VBG unremarkable, TSH normal, ammonia normal, B12 pending   · Ddimer elevated, likely post-op, CTA chest is negative for PE  · Neurology following, EEG pending today 6/28    Myoclonus  Assessment & Plan  · Noted in upper extremities, also on face at times  · Neurology consulted recommending - suspected to be secondary to medication effect  · Discontinued Soma 6/27  · Decreased Ambien 5mg 6/26    HTN (hypertension)  Assessment & Plan  · Chronically on verapamil, initially hypotensive  · Verapamil dose decreased from 360 mg to 240 mg and will continue at this dose for now  /61 (BP Location: Right arm)   Pulse 83   Temp 97 9 °F (36 6 °C) (Oral)   Resp 16   Ht 5' 7" (1 702 m)   Wt 81 2 kg (179 lb)   LMP  (LMP Unknown)   SpO2 91%   BMI 28 04 kg/m²     Smoking  Assessment & Plan  · Patient would benefit from smoking cessation, this was discussed and it will be reinforced throughout the hospitalization  · CTA showing emphysematous changes           VTE Pharmacologic Prophylaxis: VTE Score: 3 Moderate Risk (Score 3-4) - Pharmacological DVT Prophylaxis Ordered: enoxaparin (Lovenox)      Patient Centered Rounds: I performed bedside rounds with nursing staff today  Discussions with Specialists or Other Care Team Provider: ortho, neuro, CM     Education and Discussions with Family / Patient: Patient declined call to   Time Spent for Care: 20 minutes  More than 50% of total time spent on counseling and coordination of care as described above  Current Length of Stay: 7 day(s)  Current Patient Status: Inpatient   Certification Statement: The patient will continue to require additional inpatient hospital stay due to neuro workup ongoing, eeg pending today  Discharge Plan: Lolly Sommers is following this patient on consult  They are not yet medically stable for discharge secondary to neuro workup including pending eeg  Code Status: Level 1 - Full Code    Subjective:   CC:  I can not wait to get out of here    Patient seen this morning, she is out of bed to chair  Just had her EEG done earlier  She is hopeful at shows nothing set she can go home  Denies any new concerns such as increased confusion, new neurologic focal deficits, worsening pain  She reports she moved with therapy this morning and has some more throbbing in the leg, but states that it should be expected  Objective:     Vitals:   Temp (24hrs), Av 1 °F (36 7 °C), Min:97 9 °F (36 6 °C), Max:98 3 °F (36 8 °C)    Temp:  [97 9 °F (36 6 °C)-98 3 °F (36 8 °C)] 97 9 °F (36 6 °C)  HR:  [83-84] 83  Resp:  [16-18] 16  BP: (109-120)/(61-64) 120/61  SpO2:  [91 %-95 %] 91 %  Body mass index is 28 04 kg/m²  Input and Output Summary (last 24 hours): Intake/Output Summary (Last 24 hours) at 2021 0848  Last data filed at 2021 7839  Gross per 24 hour   Intake --   Output 250 ml   Net -250 ml       Physical Exam:   Physical Exam  Vitals and nursing note reviewed  Constitutional:       General: She is not in acute distress  Appearance: She is not ill-appearing or toxic-appearing  Cardiovascular:      Rate and Rhythm: Normal rate and regular rhythm  Pulses: Normal pulses  Pulmonary:      Effort: Pulmonary effort is normal  No respiratory distress  Breath sounds: Normal breath sounds  Abdominal:      General: Bowel sounds are normal       Palpations: Abdomen is soft  Musculoskeletal:         General: Deformity (Right lower extremity splint in place) present  Neurological:      Mental Status: She is alert and oriented to person, place, and time  Psychiatric:         Mood and Affect: Mood normal           Additional Data:     Labs:  Results from last 7 days   Lab Units 06/28/21  0528 06/26/21  1027   WBC Thousand/uL 5 57 6 84   HEMOGLOBIN g/dL 10 4* 10 4*   HEMATOCRIT % 32 1* 32 6*   PLATELETS Thousands/uL 355 347   NEUTROS PCT %  --  50   LYMPHS PCT %  --  37   MONOS PCT %  --  9   EOS PCT %  --  3     Results from last 7 days   Lab Units 06/28/21  0528 06/26/21  1027   SODIUM mmol/L 138 137   POTASSIUM mmol/L 3 7 4 2   CHLORIDE mmol/L 103 101   CO2 mmol/L 26 30   BUN mg/dL 11 14   CREATININE mg/dL 0 66 0 79   ANION GAP mmol/L 9 6   CALCIUM mg/dL 8 9 9 0   ALBUMIN g/dL  --  2 6*   TOTAL BILIRUBIN mg/dL  --  0 28   ALK PHOS U/L  --  71   ALT U/L  --  13   AST U/L  --  9   GLUCOSE RANDOM mg/dL 99 107                       Lines/Drains:  Invasive Devices     Peripheral Intravenous Line            Peripheral IV 06/26/21 Right Forearm 1 day                      Imaging: No pertinent imaging reviewed      Recent Cultures (last 7 days):         Last 24 Hours Medication List:   Current Facility-Administered Medications   Medication Dose Route Frequency Provider Last Rate    acetaminophen  650 mg Oral Q6H [facility] Nini Mojica PA-C      aluminum-magnesium hydroxide-simethicone  30 mL Oral Q6H PRN Niraj Portillo PA-C      aspirin  162 mg Oral Daily Niraj Portillo PA-C      diazepam  5 mg Oral HS Lasha Barnes PA-C      docusate sodium  100 mg Oral BID Niraj Portillo PA-C      DULoxetine  60 mg Oral Daily Niraj Portillo Thresea Radha  enoxaparin  30 mg Subcutaneous Q12H [address] [place], [place]      folic acid  878 mcg Oral Daily Gustavo Todd MD      methocarbamol  500 mg Oral Q6H PRN Marc Almanzar PA-C      nicotine  21 mg Transdermal Daily [facility]AD [place]      ondansetron  4 mg Intravenous Q6H PRN TAD [place]      oxyCODONE  10 mg Oral Q4H PRN TAD [place]      oxyCODONE  5 mg Oral Q4H PRN TAD [place]      thiamine  100 mg Oral Daily Gustavo Todd MD      verapamil  240 mg Oral Daily Gustavo Todd MD      zolpidem  5 mg Oral HS Kamar Gonzalez PA-C          Today, Patient Was Seen By: Tucker Pearson PA-C    **Please Note: This note may have been constructed using a voice recognition system  **

## 2021-06-28 NOTE — DISCHARGE INSTRUCTIONS
Discharge Instructions - Missy 57 J Delmy 60 y o  female MRN: [id number]  Unit/Bed#: -01    Weight Bearing Status:                                           Nonweight bearing right lower extremity in splint  Do not remove splint or get wet    DVT prophylaxis  Continue aspirin 81mg twice daily    Pain:  Continue analgesics as directed    Dressing Instructions:   Please keep clean, dry and intact until follow up     Appt Instructions: If you do not have your appointment, please call the clinic at [phone] to schedule follow-up in 2 weeks  Follow up with Neurology for one time follow up  Referral placed  Contact the office sooner if you experience any increased numbness/tingling in the extremities

## 2021-06-30 ENCOUNTER — TELEPHONE (OUTPATIENT)
Dept: OBGYN CLINIC | Facility: HOSPITAL | Age: 56
End: 2021-06-30

## 2021-06-30 ENCOUNTER — PATIENT OUTREACH (OUTPATIENT)
Dept: CASE MANAGEMENT | Facility: OTHER | Age: 56
End: 2021-06-30

## 2021-06-30 NOTE — PROGRESS NOTES
In basket notification of hospital discharge  of patient in BPCI episode  Chart reviewed  I called and was hung up on before I could talk  Will attempt again

## 2021-06-30 NOTE — TELEPHONE ENCOUNTER
Patient called about Oxycodone 5 mg, she had previous 7 5 Percocet RX with Dr Karla Whitten in Neuro which was used during her visits prior to your visit  The Pharmacy will not fill new RX without approval  [facility]

## 2021-07-01 ENCOUNTER — PATIENT OUTREACH (OUTPATIENT)
Dept: CASE MANAGEMENT | Facility: OTHER | Age: 56
End: 2021-07-01

## 2021-07-01 NOTE — PROGRESS NOTES
Second attempt to reach patient  I called patient's number and was disconnected as soon as phone was answered  Will send UTR letter

## 2021-07-06 ENCOUNTER — TELEPHONE (OUTPATIENT)
Dept: OBGYN CLINIC | Facility: HOSPITAL | Age: 56
End: 2021-07-06

## 2021-07-06 ENCOUNTER — OFFICE VISIT (OUTPATIENT)
Dept: OBGYN CLINIC | Facility: CLINIC | Age: 56
End: 2021-07-06

## 2021-07-06 ENCOUNTER — APPOINTMENT (OUTPATIENT)
Dept: RADIOLOGY | Facility: CLINIC | Age: 56
End: 2021-07-06
Payer: MEDICARE

## 2021-07-06 ENCOUNTER — HOSPITAL ENCOUNTER (OUTPATIENT)
Dept: NON INVASIVE DIAGNOSTICS | Facility: CLINIC | Age: 56
Discharge: HOME | End: 2021-07-06
Payer: MEDICARE

## 2021-07-06 VITALS
HEART RATE: 85 BPM | HEIGHT: 67 IN | WEIGHT: 179 LBS | DIASTOLIC BLOOD PRESSURE: 77 MMHG | SYSTOLIC BLOOD PRESSURE: 149 MMHG | BODY MASS INDEX: 28.09 KG/M2

## 2021-07-06 DIAGNOSIS — M25.571 PAIN, JOINT, ANKLE AND FOOT, RIGHT: ICD-10-CM

## 2021-07-06 DIAGNOSIS — M79.89 LEG SWELLING: ICD-10-CM

## 2021-07-06 DIAGNOSIS — S82.841A CLOSED BIMALLEOLAR FRACTURE OF RIGHT ANKLE, INITIAL ENCOUNTER: Primary | ICD-10-CM

## 2021-07-06 PROCEDURE — 93971 EXTREMITY STUDY: CPT

## 2021-07-06 PROCEDURE — 99024 POSTOP FOLLOW-UP VISIT: CPT | Performed by: PHYSICIAN ASSISTANT

## 2021-07-06 PROCEDURE — 73610 X-RAY EXAM OF ANKLE: CPT

## 2021-07-06 PROCEDURE — 93971 EXTREMITY STUDY: CPT | Performed by: SURGERY

## 2021-07-06 RX ORDER — OXYCODONE HYDROCHLORIDE 5 MG/1
5 TABLET ORAL EVERY 4 HOURS PRN
Qty: 30 TABLET | Refills: 0 | Status: SHIPPED | OUTPATIENT
Start: 2021-07-06

## 2021-07-06 RX ORDER — NALOXONE HYDROCHLORIDE 4 MG/.1ML
SPRAY NASAL
Qty: 1 EACH | Refills: 1 | Status: SHIPPED | OUTPATIENT
Start: 2021-07-06

## 2021-07-06 NOTE — TELEPHONE ENCOUNTER
Patient called and left a voicemail to check the time of her appt  Called the phone number left on the message and it was not accepting calls at this time

## 2021-07-06 NOTE — PROGRESS NOTES
Orthopaedics Office Visit - Post-op Patient Visit    ASSESSMENT/PLAN:    Assessment:   Fifteen days status post right by malleolar ankle ORIF, DOS 06/21/2021    Plan:   - Over the counter analgesics as needed / directed   - Script placed for refill of oxycodone   - Ice / heat as directed   - Sutures removed in the office  Patient tolerated well   - Okay To begin showering  Allow water to flow over the incision sites  Do not vigorously scrubbed or soak wounds until otherwise stated    - continue nonweightbearing of the right lower extremity   - Podus boot applied to patient  Patient tolerated well  Maintain as directed  - stat venous ultrasound ordered for the right lower extremity to evaluate for acute DVTs  Continue anticoagulation until otherwise stated  - follow-up in 4 weeks with repeat x-ray  To Do Next Visit:  X-ray right ankle    _____________________________________________________  CHIEF COMPLAINT:  Chief Complaint   Patient presents with    Right Ankle - Post-op         SUBJECTIVE:  Savannah Duran is a 60 y o  female who presents Fifteen days status post right by malleolar ankle ORIF, DOS 06/21/2021  Patient states she continues to have pain in her ankle but states that it is subsiding overall  Patient states that her pain is both medial and lateral becomes worse with direct contact range of motion of the ankle  Patient states she has been compliant with nonweightbearing restriction of the right lower extremity  Patient states that she has been compliant with her DVT prophylaxis (81 mg twice daily)  Patient denies any fevers any chills  Patient denies any shortness of breath chest tightness chest pain  Patient does admit to having some calf pain which has been present since surgery  Patient does admit to having intermittent tingling in the toes which again has been present since surgery  Patient offers no other complaints at this time        SOCIAL HISTORY:  Social History Tobacco Use    Smoking status: Current Every Day Smoker     Packs/day: 0 25    Smokeless tobacco: Never Used   Vaping Use    Vaping Use: Never used   Substance Use Topics    Alcohol use: Not Currently    Drug use: Never       MEDICATIONS:    Current Outpatient Medications:     aspirin (ECOTRIN LOW STRENGTH) 81 mg EC tablet, Take 162 mg by mouth daily, Disp: , Rfl:     aspirin (ECOTRIN LOW STRENGTH) 81 mg EC tablet, Take 2 tablets (162 mg total) by mouth daily, Disp: 35 tablet, Rfl: 0    carisoprodol (SOMA) 350 mg tablet, Take 350 mg by mouth 4 (four) times a day, Disp: , Rfl:     diazepam (VALIUM) 5 mg tablet, Take 5 mg by mouth daily at bedtime, Disp: , Rfl:     DULoxetine (CYMBALTA) 60 mg delayed release capsule, , Disp: , Rfl:     oxyCODONE (ROXICODONE) 5 mg immediate release tablet, Take 1 tablet (5 mg total) by mouth every 4 (four) hours as needed for moderate pain for up to 10 daysMax Daily Amount: 30 mg, Disp: 30 tablet, Rfl: 0    verapamil (CALAN) 120 mg tablet, Take 360 mg by mouth daily, Disp: , Rfl:     zolpidem (AMBIEN) 10 mg tablet, Take 10 mg by mouth daily at bedtime, Disp: , Rfl:     REVIEW OF SYSTEMS:  MSK: right ankle pain  Neuro: WNL   Pertinent items are otherwise noted in HPI  A comprehensive review of systems was otherwise negative     _____________________________________________________  PHYSICAL EXAMINATION:  Vital signs: /77   Pulse 85   Ht 5' 7" (1 702 m)   Wt 81 2 kg (179 lb)   LMP  (LMP Unknown)   BMI 28 04 kg/m²   General: No acute distress, awake and alert  Psychiatric: Mood and affect appear appropriate  HEENT: Trachea Midline, No torticollis, no apparent facial trauma  Cardiovascular: No audible murmurs;  Extremities appear perfused  Pulmonary: No audible wheezing or stridor  Skin: No open lesions; see further details (if any) below      MUSCULOSKELETAL EXAMINATION:  Right ankle examination:  - Patient sitting comfortably in the office in no acute distress - incision sites clean dry and intact with no surrounding erythema or ecchymosis present  Sutures intact with no visible drainage present  Extremity appears well-perfused overall  Minor +1 pitting edema noted in the distal aspect of lower leg   - tenderness palpation noted over the incision sites  Severe tenderness palpation appreciated over the mid lying gastroc muscle   No other bony or soft tissue tenderness palpation noted at this time  - range of motion of the ankle limited in all planes of motion secondary to pain   - NV intact    _____________________________________________________  STUDIES REVIEWED:  I personally reviewed the images and interpretation is as follows:  Right ankle x-ray three views:  Healing bimalleolar fracture in near anatomic alignment with his intact hardware present  PROCEDURES PERFORMED:  Suture removal    Date/Time: 7/6/2021 11:04 AM  Performed by: Didi Childs PA-C  Authorized by: Didi Childs PA-C   Universal Protocol:  Consent: Verbal consent obtained  Risks and benefits: risks, benefits and alternatives were discussed  Consent given by: patient  Patient understanding: patient states understanding of the procedure being performed  Site marked: the operative site was marked  Patient identity confirmed: verbally with patient        Patient location:  Bedside  Location:     Laterality:  Right    Location:  Lower extremity    Lower extremity location:  Ankle    Ankle location:  R ankle  Procedure details: Tools used:  Suture removal kit    Wound appearance:  No sign(s) of infection, good wound healing and clean  Post-procedure details:     Post-removal:  Steri-Strips applied    Patient tolerance of procedure:   Tolerated well, no immediate complications        Andressa Ching PA-C

## 2021-07-12 ENCOUNTER — PATIENT OUTREACH (OUTPATIENT)
Dept: CASE MANAGEMENT | Facility: OTHER | Age: 56
End: 2021-07-12

## 2021-07-12 NOTE — LETTER
Date: 07/12/21    Dear Alec Birch,   My name is Alis Woody; I am a registered nurse care manager working with   [facility]   [place] [id number] [facility]  [phone]  I have not been able to reach you and would like to set a time that I can talk with you over the phone  My work is to help patients that have complex medical conditions get the care they need  This includes patients who may have been in the hospital or emergency room  Please call me with any questions you may have    Sincerely,  Mariana Cruz RN  [phone]  Outpatient Care Manager

## 2021-08-01 DIAGNOSIS — M25.571 PAIN, JOINT, ANKLE AND FOOT, RIGHT: Primary | ICD-10-CM

## 2021-08-10 ENCOUNTER — APPOINTMENT (OUTPATIENT)
Dept: RADIOLOGY | Facility: CLINIC | Age: 56
End: 2021-08-10
Payer: MEDICARE

## 2021-08-10 ENCOUNTER — OFFICE VISIT (OUTPATIENT)
Dept: OBGYN CLINIC | Facility: CLINIC | Age: 56
End: 2021-08-10

## 2021-08-10 VITALS
BODY MASS INDEX: 28.09 KG/M2 | DIASTOLIC BLOOD PRESSURE: 76 MMHG | SYSTOLIC BLOOD PRESSURE: 144 MMHG | HEART RATE: 72 BPM | HEIGHT: 67 IN | WEIGHT: 179 LBS

## 2021-08-10 DIAGNOSIS — M25.571 PAIN, JOINT, ANKLE AND FOOT, RIGHT: ICD-10-CM

## 2021-08-10 DIAGNOSIS — S82.841A CLOSED BIMALLEOLAR FRACTURE OF RIGHT ANKLE, INITIAL ENCOUNTER: Primary | ICD-10-CM

## 2021-08-10 PROCEDURE — 73610 X-RAY EXAM OF ANKLE: CPT

## 2021-08-10 PROCEDURE — 99024 POSTOP FOLLOW-UP VISIT: CPT | Performed by: ORTHOPAEDIC SURGERY

## 2021-08-10 NOTE — PROGRESS NOTES
Orthopaedics Office Visit - Post-op Patient Visit    ASSESSMENT/PLAN:    Assessment:   Seven weeks status post right malleolar ankle ORIF, date of surgery 6/21/2021    Plan:   Khadijah upon examination is doing well  Mild residual swelling present at the ankle and lower leg  No clinical signs  Concerning for DVT  Her surgical incisions are well healed  She will continue NWB status due to the syndesmotic repair approximately 10 weeks status post  She may continue a 1 a day aspirin regimen  I would like her to follow up with in 3 weeks time for repeat clinical evaluation and repeat x-ray  If doing well at next visit and continues to demonstrate healing on x-ray we may begin progressing her to weight-bearing with a Cam walker boot  To Do Next Visit:  Repeat  Right ankle x-ray    _____________________________________________________  CHIEF COMPLAINT:  Chief Complaint   Patient presents with    Right Ankle - Post-op         SUBJECTIVE:  Hodan Koehler is a 60 y o  female who presents for follow-up evaluation of her right ankle  She is 7 weeks status post by malleolar ORIF and syndesmotic repair  She states she has been compliant with nonweightbearing status  She has been continuing her aspirin regimen for DVT prophylaxis  She denies any calf pain or cramping concerning for DVT  She did have an ultrasound completed that was negative for DVT as previously concerned for  She states that her sensation is returning  However notes that she does experience mild tingling to the lesser toes  She has been utilizing the posterior splint at all times        SOCIAL HISTORY:  Social History     Tobacco Use    Smoking status: Current Every Day Smoker     Packs/day: 0 25    Smokeless tobacco: Never Used   Vaping Use    Vaping Use: Never used   Substance Use Topics    Alcohol use: Not Currently    Drug use: Never       MEDICATIONS:    Current Outpatient Medications:     aspirin (ECOTRIN LOW STRENGTH) 81 mg EC tablet, Take 162 mg by mouth daily, Disp: , Rfl:     aspirin (ECOTRIN LOW STRENGTH) 81 mg EC tablet, Take 2 tablets (162 mg total) by mouth daily, Disp: 35 tablet, Rfl: 0    carisoprodol (SOMA) 350 mg tablet, Take 350 mg by mouth 4 (four) times a day, Disp: , Rfl:     diazepam (VALIUM) 5 mg tablet, Take 5 mg by mouth daily at bedtime, Disp: , Rfl:     DULoxetine (CYMBALTA) 60 mg delayed release capsule, , Disp: , Rfl:     naloxone (NARCAN) 4 mg/0 1 mL nasal spray, Administer 1 spray into a nostril  If no response after 2-3 minutes, give another dose in the other nostril using a new spray , Disp: 1 each, Rfl: 1    oxyCODONE (ROXICODONE) 5 mg immediate release tablet, Take 1 tablet (5 mg total) by mouth every 4 (four) hours as needed for moderate painMax Daily Amount: 30 mg, Disp: 30 tablet, Rfl: 0    verapamil (CALAN) 120 mg tablet, Take 360 mg by mouth daily, Disp: , Rfl:     zolpidem (AMBIEN) 10 mg tablet, Take 10 mg by mouth daily at bedtime, Disp: , Rfl:     REVIEW OF SYSTEMS:  MSK:  Right ankle pain  Neuro:  Sensation intact  Pertinent items are otherwise noted in HPI  A comprehensive review of systems was otherwise negative     _____________________________________________________  PHYSICAL EXAMINATION:  Vital signs: /76   Pulse 72   Ht 5' 7" (1 702 m)   Wt 81 2 kg (179 lb)   LMP  (LMP Unknown)   BMI 28 04 kg/m²   General: No acute distress, awake and alert  Psychiatric: Mood and affect appear appropriate  HEENT: Trachea Midline, No torticollis, no apparent facial trauma  Cardiovascular: No audible murmurs;  Extremities appear perfused  Pulmonary: No audible wheezing or stridor  Skin: No open lesions; see further details (if any) below    MUSCULOSKELETAL EXAMINATION:  Extremities:   Right ankle  -residual swelling of the ankle and calf  -  Surgical incisions are well healed  - mild tenderness to palpation medially and laterally  _____________________________________________________  STUDIES REVIEWED:  I personally reviewed the images and interpretation is as follows:  X-rays of the right ankle demonstrate a nearly fully healed ORIF for bilmalleoloar fracture and syndesmotic repair  No signs of lucency or hardware failure

## 2021-09-03 ENCOUNTER — OFFICE VISIT (OUTPATIENT)
Dept: OBGYN CLINIC | Facility: CLINIC | Age: 56
End: 2021-09-03

## 2021-09-03 ENCOUNTER — APPOINTMENT (OUTPATIENT)
Dept: RADIOLOGY | Facility: CLINIC | Age: 56
End: 2021-09-03
Payer: MEDICARE

## 2021-09-03 VITALS
HEART RATE: 68 BPM | WEIGHT: 179 LBS | HEIGHT: 67 IN | SYSTOLIC BLOOD PRESSURE: 156 MMHG | DIASTOLIC BLOOD PRESSURE: 75 MMHG | BODY MASS INDEX: 28.09 KG/M2

## 2021-09-03 DIAGNOSIS — S82.841A CLOSED BIMALLEOLAR FRACTURE OF RIGHT ANKLE, INITIAL ENCOUNTER: ICD-10-CM

## 2021-09-03 DIAGNOSIS — S82.841A CLOSED BIMALLEOLAR FRACTURE OF RIGHT ANKLE, INITIAL ENCOUNTER: Primary | ICD-10-CM

## 2021-09-03 PROCEDURE — 73610 X-RAY EXAM OF ANKLE: CPT

## 2021-09-03 PROCEDURE — 99024 POSTOP FOLLOW-UP VISIT: CPT | Performed by: ORTHOPAEDIC SURGERY

## 2021-09-03 NOTE — PROGRESS NOTES
Orthopaedics Office Visit - Post-op Patient Visit    ASSESSMENT/PLAN:    Assessment:   10 weeks status post right bimalleolar ankle ORIF with syndesmotic fixation, date of surgery 6/21/2021  Doing well overall       Plan:   - begin weight-bearing as tolerated with cam walker for the right lower extremity  Transition out of height I cam walker as tolerated  - Begin physical therapy   - Over the counter analgesics as needed / directed   - Ice / heat as directed   - Advised not to drive while actively using cam walker  Must be able to safely break before returning to road  Recommend practice in empty parking lot to test break reaction  - follow-up 3 months with repeat x-rays      To Do Next Visit:  XR right ankle     _____________________________________________________  CHIEF COMPLAINT:  Chief Complaint   Patient presents with    Right Ankle - Post-op         SUBJECTIVE:  Amirah Anne is a 60 y o  female who presents for follow-up evaluation of her right ankle  She is 10 weeks status post by malleolar ORIF and syndesmotic repair  She states she has been compliant with nonweightbearing status  She has been continuing her aspirin regimen for DVT prophylaxis  She denies any calf pain or cramping concerning for DVT  She did have an ultrasound completed that was negative for DVT as previously concerned for  She states that her sensation is returning  However notes that she does experience mild tingling to the lesser toes          SOCIAL HISTORY:  Social History     Tobacco Use    Smoking status: Current Every Day Smoker     Packs/day: 0 25    Smokeless tobacco: Never Used   Vaping Use    Vaping Use: Never used   Substance Use Topics    Alcohol use: Not Currently    Drug use: Never       MEDICATIONS:    Current Outpatient Medications:     aspirin (ECOTRIN LOW STRENGTH) 81 mg EC tablet, Take 2 tablets (162 mg total) by mouth daily, Disp: 35 tablet, Rfl: 0    carisoprodol (SOMA) 350 mg tablet, Take 350 mg by mouth 4 (four) times a day, Disp: , Rfl:     diazepam (VALIUM) 5 mg tablet, Take 5 mg by mouth daily at bedtime, Disp: , Rfl:     DULoxetine (CYMBALTA) 60 mg delayed release capsule, , Disp: , Rfl:     verapamil (CALAN) 120 mg tablet, Take 360 mg by mouth daily, Disp: , Rfl:     zolpidem (AMBIEN) 10 mg tablet, Take 10 mg by mouth daily at bedtime, Disp: , Rfl:     aspirin (ECOTRIN LOW STRENGTH) 81 mg EC tablet, Take 162 mg by mouth daily (Patient not taking: Reported on 9/3/2021), Disp: , Rfl:     naloxone (NARCAN) 4 mg/0 1 mL nasal spray, Administer 1 spray into a nostril  If no response after 2-3 minutes, give another dose in the other nostril using a new spray  (Patient not taking: Reported on 9/3/2021), Disp: 1 each, Rfl: 1    oxyCODONE (ROXICODONE) 5 mg immediate release tablet, Take 1 tablet (5 mg total) by mouth every 4 (four) hours as needed for moderate painMax Daily Amount: 30 mg (Patient not taking: Reported on 9/3/2021), Disp: 30 tablet, Rfl: 0    REVIEW OF SYSTEMS:  MSK: right ankle pain   Neuro: WNL   Pertinent items are otherwise noted in HPI  A comprehensive review of systems was otherwise negative     _____________________________________________________  PHYSICAL EXAMINATION:  Vital signs: /75   Pulse 68   Ht 5' 7" (1 702 m)   Wt 81 2 kg (179 lb)   LMP  (LMP Unknown)   BMI 28 04 kg/m²   General: No acute distress, awake and alert  Psychiatric: Mood and affect appear appropriate  HEENT: Trachea Midline, No torticollis, no apparent facial trauma  Cardiovascular: No audible murmurs; Extremities appear perfused  Pulmonary: No audible wheezing or stridor  Skin: No open lesions; see further details (if any) below      MUSCULOSKELETAL EXAMINATION:  Right ankle  -residual swelling of the ankle and calf  -  Surgical incisions are well healed  - mild tenderness to palpation medially and laterally      _____________________________________________________  STUDIES REVIEWED:  I personally reviewed the images and interpretation is as follows:  X-rays of the right ankle demonstrate a nearly fully healed ORIF for bilmalleoloar fracture and syndesmotic repair  No signs of lucency or hardware failure  PROCEDURES PERFORMED:  No procedures were performed at this time       Karla Cardona PA-C - assisting    Zehra Gupta MD

## 2021-09-16 ENCOUNTER — TELEPHONE (OUTPATIENT)
Dept: NEUROLOGY | Facility: CLINIC | Age: 56
End: 2021-09-16

## 2021-09-16 NOTE — TELEPHONE ENCOUNTER
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

## 2021-09-17 NOTE — TELEPHONE ENCOUNTER
2ND ATTEMPT LMOM for pt to call in to sched HFU appt       SLMO/Jose Myoclonus Episodes in UE and AMS/Medicare/[facility]

## 2021-09-20 NOTE — TELEPHONE ENCOUNTER
3RD ATTEMPT LMOM for pt to call in to sched HFU appt    Mailed 3rd attempt letter to pt's home       SLMO/Jose Myoclonus Episodes in UE and AMS/Medicare/[facility]

## 2021-09-27 ENCOUNTER — EPISODE CHANGES (OUTPATIENT)
Dept: CASE MANAGEMENT | Facility: OTHER | Age: 56
End: 2021-09-27

## 2023-01-24 ENCOUNTER — OFFICE VISIT (OUTPATIENT)
Dept: URGENT CARE | Facility: CLINIC | Age: 58
End: 2023-01-24

## 2023-01-24 VITALS
SYSTOLIC BLOOD PRESSURE: 176 MMHG | HEART RATE: 96 BPM | RESPIRATION RATE: 18 BRPM | TEMPERATURE: 98 F | DIASTOLIC BLOOD PRESSURE: 98 MMHG | OXYGEN SATURATION: 96 %

## 2023-01-24 DIAGNOSIS — K04.7 DENTAL INFECTION: Primary | ICD-10-CM

## 2023-01-24 RX ORDER — AMOXICILLIN AND CLAVULANATE POTASSIUM 875; 125 MG/1; MG/1
1 TABLET, FILM COATED ORAL EVERY 12 HOURS SCHEDULED
Qty: 20 TABLET | Refills: 0 | Status: SHIPPED | OUTPATIENT
Start: 2023-01-24 | End: 2023-02-03

## 2023-01-24 NOTE — PROGRESS NOTES
[facility]        NAME: Aryan Brasher is a 59 y o  female  : 1965    MRN: [id number]  DATE: 2023  TIME: 12:54 PM    Assessment and Plan   Dental infection [K04 7]  1  Dental infection  amoxicillin-clavulanate (AUGMENTIN) 875-125 mg per tablet            Patient Instructions   Patient Instructions   Follow-up with your dentist in the next 5-7 days  Any new or worsening symptoms develop get re-evaluated sooner or proceed to the ER  Follow up with PCP in 3-5 days  Proceed to  ER if symptoms worsen  Chief Complaint     Chief Complaint   Patient presents with   • Dental Pain     Dental pain to L side of face  Swelling noted  10/10 pain  Has dental appt on  to have teeth removed  History of Present Illness       Patient presents with left lower dental pain and swelling starting yesterday  Called her dentist but they aren't open today  Has an appointment with dentist on the   Denies fevers, chest pains, Palpitations, SOB  Took ibuprofen with minimal relief  Review of Systems   Review of Systems   Constitutional: Negative for fever  HENT: Positive for dental problem  Negative for congestion, ear discharge, ear pain, mouth sores, postnasal drip, rhinorrhea, sinus pressure, sinus pain, sore throat and trouble swallowing  Respiratory: Negative for shortness of breath  Cardiovascular: Negative for chest pain and palpitations  Musculoskeletal: Negative for myalgias           Current Medications       Current Outpatient Medications:   •  amoxicillin-clavulanate (AUGMENTIN) 875-125 mg per tablet, Take 1 tablet by mouth every 12 (twelve) hours for 10 days, Disp: 20 tablet, Rfl: 0  •  aspirin (ECOTRIN LOW STRENGTH) 81 mg EC tablet, Take 162 mg by mouth daily, Disp: , Rfl:   •  aspirin (ECOTRIN LOW STRENGTH) 81 mg EC tablet, Take 2 tablets (162 mg total) by mouth daily (Patient not taking: Reported on 2023), Disp: 35 tablet, Rfl: 0  • carisoprodol (SOMA) 350 mg tablet, Take 350 mg by mouth 4 (four) times a day (Patient not taking: Reported on 1/24/2023), Disp: , Rfl:   •  diazepam (VALIUM) 5 mg tablet, Take 5 mg by mouth daily at bedtime (Patient not taking: Reported on 1/24/2023), Disp: , Rfl:   •  DULoxetine (CYMBALTA) 60 mg delayed release capsule, , Disp: , Rfl:   •  naloxone (NARCAN) 4 mg/0 1 mL nasal spray, Administer 1 spray into a nostril  If no response after 2-3 minutes, give another dose in the other nostril using a new spray   (Patient not taking: Reported on 9/3/2021), Disp: 1 each, Rfl: 1  •  oxyCODONE (ROXICODONE) 5 mg immediate release tablet, Take 1 tablet (5 mg total) by mouth every 4 (four) hours as needed for moderate painMax Daily Amount: 30 mg (Patient not taking: Reported on 9/3/2021), Disp: 30 tablet, Rfl: 0  •  verapamil (CALAN) 120 mg tablet, Take 360 mg by mouth daily (Patient not taking: Reported on 1/24/2023), Disp: , Rfl:   •  zolpidem (AMBIEN) 10 mg tablet, Take 10 mg by mouth daily at bedtime (Patient not taking: Reported on 1/24/2023), Disp: , Rfl:     Current Allergies     Allergies as of 01/24/2023 - Reviewed 01/24/2023   Allergen Reaction Noted   • Morphine Hives 12/22/2019   • Prednisone  06/11/2015            The following portions of the patient's history were reviewed and updated as appropriate: allergies, current medications, past family history, past medical history, past social history, past surgical history and problem list      Past Medical History:   Diagnosis Date   • Cancer ([address] )    • Chronic back pain    • Hypertension    • Migraine    • Non Hodgkin's lymphoma ([facility] [address] )        Past Surgical History:   Procedure Laterality Date   • HYSTERECTOMY     • MA OPEN TREATMENT BIMALLEOLAR ANKLE FRACTURE Right 6/21/2021    Procedure: OPEN REDUCTION W/ INTERNAL FIXATION (ORIF) RIGHT ANKLE BIMALLEOLAR FRACTURE;  Surgeon: Pebbles Farias MD;  Location: [facility];  Service: Orthopedics   • TONSILLECTOMY History reviewed  No pertinent family history  Medications have been verified  Objective   BP (!) 176/98   Pulse 96   Temp 98 °F (36 7 °C) (Temporal)   Resp 18   LMP  (LMP Unknown)   SpO2 96%        Physical Exam     Physical Exam  Constitutional:       Appearance: Normal appearance  HENT:      Mouth/Throat:      Comments:   Erythema and swelling around the left lower molar gum line  Multiple missing teeth, dental caries, and broken teeth  Lymphadenopathy:      Cervical: No cervical adenopathy  Neurological:      Mental Status: She is alert     Psychiatric:         Mood and Affect: Mood normal          Behavior: Behavior normal

## 2023-01-24 NOTE — PATIENT INSTRUCTIONS
Follow-up with your dentist in the next 5-7 days  Any new or worsening symptoms develop get re-evaluated sooner or proceed to the ER

## 2024-11-03 ENCOUNTER — RA CDI HCC (OUTPATIENT)
Dept: OTHER | Facility: HOSPITAL | Age: 59
End: 2024-11-03

## 2024-11-07 ENCOUNTER — TELEPHONE (OUTPATIENT)
Dept: FAMILY MEDICINE CLINIC | Facility: CLINIC | Age: 59
End: 2024-11-07

## 2024-11-08 ENCOUNTER — OFFICE VISIT (OUTPATIENT)
Dept: FAMILY MEDICINE CLINIC | Facility: CLINIC | Age: 59
End: 2024-11-08
Payer: COMMERCIAL

## 2024-11-08 VITALS
WEIGHT: 227.6 LBS | DIASTOLIC BLOOD PRESSURE: 82 MMHG | SYSTOLIC BLOOD PRESSURE: 136 MMHG | TEMPERATURE: 98 F | BODY MASS INDEX: 37.92 KG/M2 | HEIGHT: 65 IN | OXYGEN SATURATION: 99 % | HEART RATE: 88 BPM

## 2024-11-08 DIAGNOSIS — S91.001A WOUND OF RIGHT ANKLE, INITIAL ENCOUNTER: ICD-10-CM

## 2024-11-08 DIAGNOSIS — Z13.1 SCREENING FOR DIABETES MELLITUS (DM): ICD-10-CM

## 2024-11-08 DIAGNOSIS — E66.01 OBESITY, MORBID (HCC): ICD-10-CM

## 2024-11-08 DIAGNOSIS — Z23 ENCOUNTER FOR IMMUNIZATION: ICD-10-CM

## 2024-11-08 DIAGNOSIS — F17.210 SMOKING GREATER THAN 30 PACK YEARS: ICD-10-CM

## 2024-11-08 DIAGNOSIS — M54.50 CHRONIC LOW BACK PAIN, UNSPECIFIED BACK PAIN LATERALITY, UNSPECIFIED WHETHER SCIATICA PRESENT: ICD-10-CM

## 2024-11-08 DIAGNOSIS — R01.1 HEART MURMUR, SYSTOLIC: ICD-10-CM

## 2024-11-08 DIAGNOSIS — C09.9 TONSILLAR CANCER (HCC): ICD-10-CM

## 2024-11-08 DIAGNOSIS — Z12.31 ENCOUNTER FOR SCREENING MAMMOGRAM FOR MALIGNANT NEOPLASM OF BREAST: ICD-10-CM

## 2024-11-08 DIAGNOSIS — Z00.00 ENCOUNTER FOR MEDICAL EXAMINATION TO ESTABLISH CARE: ICD-10-CM

## 2024-11-08 DIAGNOSIS — Z12.11 SCREEN FOR COLON CANCER: ICD-10-CM

## 2024-11-08 DIAGNOSIS — Z11.59 ENCOUNTER FOR HEPATITIS C SCREENING TEST FOR LOW RISK PATIENT: ICD-10-CM

## 2024-11-08 DIAGNOSIS — F51.01 PRIMARY INSOMNIA: ICD-10-CM

## 2024-11-08 DIAGNOSIS — Z00.00 MEDICARE ANNUAL WELLNESS VISIT, INITIAL: Primary | ICD-10-CM

## 2024-11-08 DIAGNOSIS — R60.0 BILATERAL LOWER EXTREMITY EDEMA: ICD-10-CM

## 2024-11-08 DIAGNOSIS — F17.200 SMOKING: ICD-10-CM

## 2024-11-08 DIAGNOSIS — I10 HTN, GOAL BELOW 140/90: ICD-10-CM

## 2024-11-08 DIAGNOSIS — E78.5 DYSLIPIDEMIA, GOAL LDL BELOW 130: ICD-10-CM

## 2024-11-08 DIAGNOSIS — L03.115 CELLULITIS OF RIGHT LOWER EXTREMITY: ICD-10-CM

## 2024-11-08 DIAGNOSIS — F11.20 CONTINUOUS OPIOID DEPENDENCE (HCC): ICD-10-CM

## 2024-11-08 DIAGNOSIS — G89.29 CHRONIC LOW BACK PAIN, UNSPECIFIED BACK PAIN LATERALITY, UNSPECIFIED WHETHER SCIATICA PRESENT: ICD-10-CM

## 2024-11-08 PROBLEM — Z79.899 ENCOUNTER FOR MONITORING SUBOXONE MAINTENANCE THERAPY: Status: ACTIVE | Noted: 2024-11-08

## 2024-11-08 PROBLEM — Z79.891 ENCOUNTER FOR MONITORING SUBOXONE MAINTENANCE THERAPY: Status: ACTIVE | Noted: 2024-11-08

## 2024-11-08 PROBLEM — Z51.81 ENCOUNTER FOR MONITORING SUBOXONE MAINTENANCE THERAPY: Status: RESOLVED | Noted: 2024-11-08 | Resolved: 2024-11-08

## 2024-11-08 PROBLEM — S82.841A BIMALLEOLAR FRACTURE OF RIGHT ANKLE: Status: RESOLVED | Noted: 2021-06-11 | Resolved: 2024-11-08

## 2024-11-08 PROBLEM — G25.3 MYOCLONUS: Status: RESOLVED | Noted: 2021-06-26 | Resolved: 2024-11-08

## 2024-11-08 PROBLEM — Z79.899 ENCOUNTER FOR MONITORING SUBOXONE MAINTENANCE THERAPY: Status: RESOLVED | Noted: 2024-11-08 | Resolved: 2024-11-08

## 2024-11-08 PROBLEM — Z79.891 ENCOUNTER FOR MONITORING SUBOXONE MAINTENANCE THERAPY: Status: RESOLVED | Noted: 2024-11-08 | Resolved: 2024-11-08

## 2024-11-08 PROBLEM — Z51.81 ENCOUNTER FOR MONITORING SUBOXONE MAINTENANCE THERAPY: Status: ACTIVE | Noted: 2024-11-08

## 2024-11-08 PROBLEM — G93.40 ACUTE ENCEPHALOPATHY: Status: RESOLVED | Noted: 2021-06-26 | Resolved: 2024-11-08

## 2024-11-08 PROCEDURE — 87106 FUNGI IDENTIFICATION YEAST: CPT | Performed by: NURSE PRACTITIONER

## 2024-11-08 PROCEDURE — G0008 ADMIN INFLUENZA VIRUS VAC: HCPCS

## 2024-11-08 PROCEDURE — 87070 CULTURE OTHR SPECIMN AEROBIC: CPT | Performed by: NURSE PRACTITIONER

## 2024-11-08 PROCEDURE — 99204 OFFICE O/P NEW MOD 45 MIN: CPT | Performed by: NURSE PRACTITIONER

## 2024-11-08 PROCEDURE — G0438 PPPS, INITIAL VISIT: HCPCS | Performed by: NURSE PRACTITIONER

## 2024-11-08 PROCEDURE — 87205 SMEAR GRAM STAIN: CPT | Performed by: NURSE PRACTITIONER

## 2024-11-08 PROCEDURE — 90673 RIV3 VACCINE NO PRESERV IM: CPT

## 2024-11-08 RX ORDER — QUETIAPINE FUMARATE 25 MG/1
25 TABLET, FILM COATED ORAL
Qty: 30 TABLET | Refills: 2 | Status: SHIPPED | OUTPATIENT
Start: 2024-11-08 | End: 2025-02-06

## 2024-11-08 RX ORDER — FUROSEMIDE 20 MG/1
20 TABLET ORAL DAILY
Qty: 30 TABLET | Refills: 5 | Status: SHIPPED | OUTPATIENT
Start: 2024-11-08

## 2024-11-08 RX ORDER — POTASSIUM CHLORIDE 1500 MG/1
20 TABLET, EXTENDED RELEASE ORAL DAILY
Qty: 30 TABLET | Refills: 3 | Status: SHIPPED | OUTPATIENT
Start: 2024-11-08

## 2024-11-08 RX ORDER — CEPHALEXIN 500 MG/1
500 CAPSULE ORAL 3 TIMES DAILY
Qty: 21 CAPSULE | Refills: 0 | Status: SHIPPED | OUTPATIENT
Start: 2024-11-08 | End: 2024-11-15

## 2024-11-08 RX ORDER — BUPRENORPHINE HYDROCHLORIDE AND NALOXONE HYDROCHLORIDE DIHYDRATE 8; 2 MG/1; MG/1
TABLET SUBLINGUAL
COMMUNITY
Start: 2024-10-31

## 2024-11-08 NOTE — ASSESSMENT & PLAN NOTE
Orders:    Comprehensive metabolic panel; Future    CBC and differential; Future    B-Type Natriuretic Peptide(BNP); Future    TSH, 3rd generation with Free T4 reflex; Future    Echo complete w/ contrast if indicated; Future    POCT ECG     VAS VENOUS DUPLEX - LOWER LIMB BILATERAL; Future    furosemide (LASIX) 20 mg tablet; Take 1 tablet (20 mg total) by mouth daily    potassium chloride (Klor-Con M20) 20 mEq tablet; Take 1 tablet (20 mEq total) by mouth daily

## 2024-11-08 NOTE — ASSESSMENT & PLAN NOTE
Orders:    Echo complete w/ contrast if indicated; Future    POCT ECG  Has a history of HTN and is not currently HTN
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
## 2024-11-08 NOTE — ASSESSMENT & PLAN NOTE
Orders:    QUEtiapine (SEROquel) 25 mg tablet; Take 1 tablet (25 mg total) by mouth daily at bedtime  Patient states that she has taken Ambien in the past and was effective for her insomnia but I explained is a controlled substance and has potential for habit forming will start low dose of Seroquel dw patient sleep hygiene and f/u in 4 weeks to recheck

## 2024-11-08 NOTE — PATIENT INSTRUCTIONS
Medicare Preventive Visit Patient Instructions  Thank you for completing your Welcome to Medicare Visit or Medicare Annual Wellness Visit today. Your next wellness visit will be due in one year (11/9/2025).  The screening/preventive services that you may require over the next 5-10 years are detailed below. Some tests may not apply to you based off risk factors and/or age. Screening tests ordered at today's visit but not completed yet may show as past due. Also, please note that scanned in results may not display below.  Preventive Screenings:  Service Recommendations Previous Testing/Comments   Colorectal Cancer Screening  * Colonoscopy    * Fecal Occult Blood Test (FOBT)/Fecal Immunochemical Test (FIT)  * Fecal DNA/Cologuard Test  * Flexible Sigmoidoscopy Age: 45-75 years old   Colonoscopy: every 10 years (may be performed more frequently if at higher risk)  OR  FOBT/FIT: every 1 year  OR  Cologuard: every 3 years  OR  Sigmoidoscopy: every 5 years  Screening may be recommended earlier than age 45 if at higher risk for colorectal cancer. Also, an individualized decision between you and your healthcare provider will decide whether screening between the ages of 76-85 would be appropriate. Colonoscopy: Not on file  FOBT/FIT: Not on file  Cologuard: Not on file  Sigmoidoscopy: Not on file          Breast Cancer Screening Age: 40+ years old  Frequency: every 1-2 years  Not required if history of left and right mastectomy Mammogram: 02/13/2013        Cervical Cancer Screening Between the ages of 21-29, pap smear recommended once every 3 years.   Between the ages of 30-65, can perform pap smear with HPV co-testing every 5 years.   Recommendations may differ for women with a history of total hysterectomy, cervical cancer, or abnormal pap smears in past. Pap Smear: Not on file        Hepatitis C Screening Once for adults born between 1945 and 1965  More frequently in patients at high risk for Hepatitis C Hep C Antibody: Not  on file        Diabetes Screening 1-2 times per year if you're at risk for diabetes or have pre-diabetes Fasting glucose: 100 mg/dL (6/21/2021)  A1C: No results in last 5 years (No results in last 5 years)      Cholesterol Screening Once every 5 years if you don't have a lipid disorder. May order more often based on risk factors. Lipid panel: Not on file          Other Preventive Screenings Covered by Medicare:  Abdominal Aortic Aneurysm (AAA) Screening: covered once if your at risk. You're considered to be at risk if you have a family history of AAA.  Lung Cancer Screening: covers low dose CT scan once per year if you meet all of the following conditions: (1) Age 55-77; (2) No signs or symptoms of lung cancer; (3) Current smoker or have quit smoking within the last 15 years; (4) You have a tobacco smoking history of at least 20 pack years (packs per day multiplied by number of years you smoked); (5) You get a written order from a healthcare provider.  Glaucoma Screening: covered annually if you're considered high risk: (1) You have diabetes OR (2) Family history of glaucoma OR (3)  aged 50 and older OR (4)  American aged 65 and older  Osteoporosis Screening: covered every 2 years if you meet one of the following conditions: (1) You're estrogen deficient and at risk for osteoporosis based off medical history and other findings; (2) Have a vertebral abnormality; (3) On glucocorticoid therapy for more than 3 months; (4) Have primary hyperparathyroidism; (5) On osteoporosis medications and need to assess response to drug therapy.   Last bone density test (DXA Scan): Not on file.  HIV Screening: covered annually if you're between the age of 15-65. Also covered annually if you are younger than 15 and older than 65 with risk factors for HIV infection. For pregnant patients, it is covered up to 3 times per pregnancy.    Immunizations:  Immunization Recommendations   Influenza Vaccine Annual  influenza vaccination during flu season is recommended for all persons aged >= 6 months who do not have contraindications   Pneumococcal Vaccine   * Pneumococcal conjugate vaccine = PCV13 (Prevnar 13), PCV15 (Vaxneuvance), PCV20 (Prevnar 20)  * Pneumococcal polysaccharide vaccine = PPSV23 (Pneumovax) Adults 19-63 yo with certain risk factors or if 65+ yo  If never received any pneumonia vaccine: recommend Prevnar 20 (PCV20)  Give PCV20 if previously received 1 dose of PCV13 or PPSV23   Hepatitis B Vaccine 3 dose series if at intermediate or high risk (ex: diabetes, end stage renal disease, liver disease)   Respiratory syncytial virus (RSV) Vaccine - COVERED BY MEDICARE PART D  * RSVPreF3 (Arexvy) CDC recommends that adults 60 years of age and older may receive a single dose of RSV vaccine using shared clinical decision-making (SCDM)   Tetanus (Td) Vaccine - COST NOT COVERED BY MEDICARE PART B Following completion of primary series, a booster dose should be given every 10 years to maintain immunity against tetanus. Td may also be given as tetanus wound prophylaxis.   Tdap Vaccine - COST NOT COVERED BY MEDICARE PART B Recommended at least once for all adults. For pregnant patients, recommended with each pregnancy.   Shingles Vaccine (Shingrix) - COST NOT COVERED BY MEDICARE PART B  2 shot series recommended in those 19 years and older who have or will have weakened immune systems or those 50 years and older     Health Maintenance Due:      Topic Date Due   • Hepatitis C Screening  Never done   • HIV Screening  Never done   • Colorectal Cancer Screening  Never done   • Breast Cancer Screening: Mammogram  02/13/2014     Immunizations Due:      Topic Date Due   • Pneumococcal Vaccine: Pediatrics (0 to 5 Years) and At-Risk Patients (6 to 64 Years) (2 of 2 - PPSV23 or PCV20) 07/28/2017   • Influenza Vaccine (1) 09/01/2024   • COVID-19 Vaccine (3 - 2023-24 season) 09/01/2024     Advance Directives   What are advance  directives?  Advance directives are legal documents that state your wishes and plans for medical care. These plans are made ahead of time in case you lose your ability to make decisions for yourself. Advance directives can apply to any medical decision, such as the treatments you want, and if you want to donate organs.   What are the types of advance directives?  There are many types of advance directives, and each state has rules about how to use them. You may choose a combination of any of the following:  Living will:  This is a written record of the treatment you want. You can also choose which treatments you do not want, which to limit, and which to stop at a certain time. This includes surgery, medicine, IV fluid, and tube feedings.   Durable power of  for healthcare (DPAHC):  This is a written record that states who you want to make healthcare choices for you when you are unable to make them for yourself. This person, called a proxy, is usually a family member or a friend. You may choose more than 1 proxy.  Do not resuscitate (DNR) order:  A DNR order is used in case your heart stops beating or you stop breathing. It is a request not to have certain forms of treatment, such as CPR. A DNR order may be included in other types of advance directives.  Medical directive:  This covers the care that you want if you are in a coma, near death, or unable to make decisions for yourself. You can list the treatments you want for each condition. Treatment may include pain medicine, surgery, blood transfusions, dialysis, IV or tube feedings, and a ventilator (breathing machine).  Values history:  This document has questions about your views, beliefs, and how you feel and think about life. This information can help others choose the care that you would choose.  Why are advance directives important?  An advance directive helps you control your care. Although spoken wishes may be used, it is better to have your wishes  written down. Spoken wishes can be misunderstood, or not followed. Treatments may be given even if you do not want them. An advance directive may make it easier for your family to make difficult choices about your care.   Cigarette Smoking and Your Health   Risks to your health if you smoke:  Nicotine and other chemicals found in tobacco damage every cell in your body. Even if you are a light smoker, you have an increased risk for cancer, heart disease, and lung disease. If you are pregnant or have diabetes, smoking increases your risk for complications.   Benefits to your health if you stop smoking:   You decrease respiratory symptoms such as coughing, wheezing, and shortness of breath.   You reduce your risk for cancers of the lung, mouth, throat, kidney, bladder, pancreas, stomach, and cervix. If you already have cancer, you increase the benefits of chemotherapy. You also reduce your risk for cancer returning or a second cancer from developing.   You reduce your risk for heart disease, blood clots, heart attack, and stroke.   You reduce your risk for lung infections, and diseases such as pneumonia, asthma, chronic bronchitis, and emphysema.  Your circulation improves. More oxygen can be delivered to your body. If you have diabetes, you lower your risk for complications, such as kidney, artery, and eye diseases. You also lower your risk for nerve damage. Nerve damage can lead to amputations, poor vision, and blindness.  You improve your body's ability to heal and to fight infections.  For more information and support to stop smoking:   [employer]  Phone: 5- 134 - 429-6414  Web Address: www.[employer]  Weight Management   Why it is important to manage your weight:  Being overweight increases your risk of health conditions such as heart disease, high blood pressure, type 2 diabetes, and certain types of cancer. It can also increase your risk for osteoarthritis, sleep apnea, and other respiratory problems. Aim  for a slow, steady weight loss. Even a small amount of weight loss can lower your risk of health problems.  How to lose weight safely:  A safe and healthy way to lose weight is to eat fewer calories and get regular exercise. You can lose up about 1 pound a week by decreasing the number of calories you eat by 500 calories each day.   Healthy meal plan for weight management:  A healthy meal plan includes a variety of foods, contains fewer calories, and helps you stay healthy. A healthy meal plan includes the following:  Eat whole-grain foods more often.  A healthy meal plan should contain fiber. Fiber is the part of grains, fruits, and vegetables that is not broken down by your body. Whole-grain foods are healthy and provide extra fiber in your diet. Some examples of whole-grain foods are whole-wheat breads and pastas, oatmeal, brown rice, and bulgur.  Eat a variety of vegetables every day.  Include dark, leafy greens such as spinach, kale, nancy greens, and mustard greens. Eat yellow and orange vegetables such as carrots, sweet potatoes, and winter squash.   Eat a variety of fruits every day.  Choose fresh or canned fruit (canned in its own juice or light syrup) instead of juice. Fruit juice has very little or no fiber.  Eat low-fat dairy foods.  Drink fat-free (skim) milk or 1% milk. Eat fat-free yogurt and low-fat cottage cheese. Try low-fat cheeses such as mozzarella and other reduced-fat cheeses.  Choose meat and other protein foods that are low in fat.  Choose beans or other legumes such as split peas or lentils. Choose fish, skinless poultry (chicken or turkey), or lean cuts of red meat (beef or pork). Before you cook meat or poultry, cut off any visible fat.   Use less fat and oil.  Try baking foods instead of frying them. Add less fat, such as margarine, sour cream, regular salad dressing and mayonnaise to foods. Eat fewer high-fat foods. Some examples of high-fat foods include french fries, doughnuts, ice  cream, and cakes.  Eat fewer sweets.  Limit foods and drinks that are high in sugar. This includes candy, cookies, regular soda, and sweetened drinks.  Exercise:  Exercise at least 30 minutes per day on most days of the week. Some examples of exercise include walking, biking, dancing, and swimming. You can also fit in more physical activity by taking the stairs instead of the elevator or parking farther away from stores. Ask your healthcare provider about the best exercise plan for you.      © Copyright [employer] 2018 Information is for End User's use only and may not be sold, redistributed or otherwise used for commercial purposes. All illustrations and images included in CareNotes® are the copyrighted property of [employer] or [employer]

## 2024-11-08 NOTE — ASSESSMENT & PLAN NOTE
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

## 2024-11-08 NOTE — ASSESSMENT & PLAN NOTE
Orders:    Ambulatory Referral to Wound Care; Future    XR ankle 3+ vw right; Future  wound is in area of right ankle Fx and repair and when she has swelling in the ankle the wound will open and ooze

## 2024-11-08 NOTE — ASSESSMENT & PLAN NOTE
Patient is being managed by [facility] online provider for the Suboxone and has been stable for the past several years on this regimen.

## 2024-11-09 PROBLEM — Z00.00 ENCOUNTER FOR MEDICAL EXAMINATION TO ESTABLISH CARE: Status: ACTIVE | Noted: 2024-11-09

## 2024-11-09 PROBLEM — L03.115 CELLULITIS OF RIGHT LOWER EXTREMITY: Status: ACTIVE | Noted: 2024-11-09

## 2024-11-09 PROCEDURE — 93000 ELECTROCARDIOGRAM COMPLETE: CPT | Performed by: NURSE PRACTITIONER

## 2024-11-09 NOTE — ASSESSMENT & PLAN NOTE
Orders:    cephalexin (KEFLEX) 500 mg capsule; Take 1 capsule (500 mg total) by mouth 3 (three) times a day for 7 days    Wound culture and Gram stain; Future    Wound culture and Gram stain

## 2024-11-10 LAB
BACTERIA WND AEROBE CULT: NORMAL
GRAM STN SPEC: NORMAL
GRAM STN SPEC: NORMAL

## 2024-11-11 DIAGNOSIS — B37.2 CANDIDOSIS OF SKIN: Primary | ICD-10-CM

## 2024-11-11 LAB
BACTERIA WND AEROBE CULT: ABNORMAL
BACTERIA WND AEROBE CULT: ABNORMAL
GRAM STN SPEC: ABNORMAL
GRAM STN SPEC: ABNORMAL

## 2024-11-11 RX ORDER — NYSTATIN 100000 U/G
CREAM TOPICAL 2 TIMES DAILY
Qty: 30 G | Refills: 0 | Status: SHIPPED | OUTPATIENT
Start: 2024-11-11 | End: 2024-11-21

## 2024-12-05 DIAGNOSIS — R60.0 BILATERAL LOWER EXTREMITY EDEMA: ICD-10-CM

## 2024-12-05 DIAGNOSIS — F51.01 PRIMARY INSOMNIA: ICD-10-CM

## 2024-12-05 RX ORDER — FUROSEMIDE 20 MG/1
20 TABLET ORAL DAILY
Qty: 90 TABLET | Refills: 5 | Status: SHIPPED | OUTPATIENT
Start: 2024-12-05

## 2024-12-05 RX ORDER — QUETIAPINE FUMARATE 25 MG/1
25 TABLET, FILM COATED ORAL
Qty: 90 TABLET | Refills: 2 | Status: SHIPPED | OUTPATIENT
Start: 2024-12-05

## 2024-12-05 RX ORDER — POTASSIUM CHLORIDE 1500 MG/1
20 TABLET, EXTENDED RELEASE ORAL DAILY
Qty: 90 TABLET | Refills: 3 | Status: SHIPPED | OUTPATIENT
Start: 2024-12-05

## 2024-12-08 PROBLEM — Z00.00 MEDICARE ANNUAL WELLNESS VISIT, INITIAL: Status: RESOLVED | Noted: 2024-11-08 | Resolved: 2024-12-08

## 2024-12-08 PROBLEM — Z12.11 SCREEN FOR COLON CANCER: Status: RESOLVED | Noted: 2024-11-08 | Resolved: 2024-12-08

## 2024-12-08 PROBLEM — Z11.59 ENCOUNTER FOR HEPATITIS C SCREENING TEST FOR LOW RISK PATIENT: Status: RESOLVED | Noted: 2024-11-08 | Resolved: 2024-12-08

## 2024-12-08 PROBLEM — Z13.1 SCREENING FOR DIABETES MELLITUS (DM): Status: RESOLVED | Noted: 2024-11-08 | Resolved: 2024-12-08

## 2024-12-13 ENCOUNTER — RESULTS FOLLOW-UP (OUTPATIENT)
Dept: FAMILY MEDICINE CLINIC | Facility: CLINIC | Age: 59
End: 2024-12-13

## 2024-12-13 ENCOUNTER — APPOINTMENT (OUTPATIENT)
Age: 59
End: 2024-12-13
Payer: COMMERCIAL

## 2024-12-13 ENCOUNTER — HOSPITAL ENCOUNTER (OUTPATIENT)
Dept: VASCULAR ULTRASOUND | Facility: HOSPITAL | Age: 59
Discharge: HOME | End: 2024-12-13
Payer: COMMERCIAL

## 2024-12-13 ENCOUNTER — HOSPITAL ENCOUNTER (OUTPATIENT)
Age: 59
Discharge: HOME | End: 2024-12-13
Payer: COMMERCIAL

## 2024-12-13 DIAGNOSIS — Z13.1 SCREENING FOR DIABETES MELLITUS (DM): ICD-10-CM

## 2024-12-13 DIAGNOSIS — R60.0 BILATERAL LOWER EXTREMITY EDEMA: ICD-10-CM

## 2024-12-13 DIAGNOSIS — Z11.59 ENCOUNTER FOR HEPATITIS C SCREENING TEST FOR LOW RISK PATIENT: ICD-10-CM

## 2024-12-13 DIAGNOSIS — E78.5 DYSLIPIDEMIA, GOAL LDL BELOW 130: ICD-10-CM

## 2024-12-13 DIAGNOSIS — Z12.31 ENCOUNTER FOR SCREENING MAMMOGRAM FOR MALIGNANT NEOPLASM OF BREAST: ICD-10-CM

## 2024-12-13 DIAGNOSIS — B37.2 CANDIDOSIS OF SKIN: ICD-10-CM

## 2024-12-13 LAB
ALBUMIN SERPL BCG-MCNC: 4.3 G/DL (ref 3.5–5)
ALP SERPL-CCNC: 54 U/L (ref 34–104)
ALT SERPL W P-5'-P-CCNC: 10 U/L (ref 7–52)
ANION GAP SERPL CALCULATED.3IONS-SCNC: 10 MMOL/L (ref 4–13)
AST SERPL W P-5'-P-CCNC: 14 U/L (ref 13–39)
BASOPHILS # BLD AUTO: 0.04 THOUSANDS/ÂΜL (ref 0–0.1)
BASOPHILS NFR BLD AUTO: 1 % (ref 0–1)
BILIRUB SERPL-MCNC: 0.41 MG/DL (ref 0.2–1)
BNP SERPL-MCNC: 33 PG/ML (ref 0–100)
BUN SERPL-MCNC: 18 MG/DL (ref 5–25)
CALCIUM SERPL-MCNC: 9.6 MG/DL (ref 8.4–10.2)
CHLORIDE SERPL-SCNC: 102 MMOL/L (ref 96–108)
CHOLEST SERPL-MCNC: 223 MG/DL (ref ?–200)
CO2 SERPL-SCNC: 28 MMOL/L (ref 21–32)
CREAT SERPL-MCNC: 0.72 MG/DL (ref 0.6–1.3)
EOSINOPHIL # BLD AUTO: 0.08 THOUSAND/ÂΜL (ref 0–0.61)
EOSINOPHIL NFR BLD AUTO: 1 % (ref 0–6)
ERYTHROCYTE [DISTWIDTH] IN BLOOD BY AUTOMATED COUNT: 12 % (ref 11.6–15.1)
EST. AVERAGE GLUCOSE BLD GHB EST-MCNC: 128 MG/DL
GFR SERPL CREATININE-BSD FRML MDRD: 90 ML/MIN/1.73SQ M
GLUCOSE P FAST SERPL-MCNC: 120 MG/DL (ref 65–99)
HBA1C MFR BLD: 6.1 %
HCT VFR BLD AUTO: 44 % (ref 34.8–46.1)
HCV AB SER QL: NORMAL
HDLC SERPL-MCNC: 52 MG/DL
HGB BLD-MCNC: 14.4 G/DL (ref 11.5–15.4)
IMM GRANULOCYTES # BLD AUTO: 0.04 THOUSAND/UL (ref 0–0.2)
IMM GRANULOCYTES NFR BLD AUTO: 1 % (ref 0–2)
LDLC SERPL CALC-MCNC: 139 MG/DL (ref 0–100)
LYMPHOCYTES # BLD AUTO: 2 THOUSANDS/ÂΜL (ref 0.6–4.47)
LYMPHOCYTES NFR BLD AUTO: 23 % (ref 14–44)
MCH RBC QN AUTO: 31.6 PG (ref 26.8–34.3)
MCHC RBC AUTO-ENTMCNC: 32.7 G/DL (ref 31.4–37.4)
MCV RBC AUTO: 97 FL (ref 82–98)
MONOCYTES # BLD AUTO: 0.68 THOUSAND/ÂΜL (ref 0.17–1.22)
MONOCYTES NFR BLD AUTO: 8 % (ref 4–12)
NEUTROPHILS # BLD AUTO: 6.02 THOUSANDS/ÂΜL (ref 1.85–7.62)
NEUTS SEG NFR BLD AUTO: 66 % (ref 43–75)
NONHDLC SERPL-MCNC: 171 MG/DL
NRBC BLD AUTO-RTO: 0 /100 WBCS
PLATELET # BLD AUTO: 252 THOUSANDS/UL (ref 149–390)
PMV BLD AUTO: 10.9 FL (ref 8.9–12.7)
POTASSIUM SERPL-SCNC: 4 MMOL/L (ref 3.5–5.3)
PROT SERPL-MCNC: 7.5 G/DL (ref 6.4–8.4)
RBC # BLD AUTO: 4.56 MILLION/UL (ref 3.81–5.12)
SODIUM SERPL-SCNC: 140 MMOL/L (ref 135–147)
TRIGL SERPL-MCNC: 160 MG/DL (ref ?–150)
TSH SERPL DL<=0.05 MIU/L-ACNC: 1.34 UIU/ML (ref 0.45–4.5)
WBC # BLD AUTO: 8.86 THOUSAND/UL (ref 4.31–10.16)

## 2024-12-13 PROCEDURE — 93970 EXTREMITY STUDY: CPT

## 2024-12-13 PROCEDURE — 77067 SCR MAMMO BI INCL CAD: CPT

## 2024-12-13 PROCEDURE — 83036 HEMOGLOBIN GLYCOSYLATED A1C: CPT

## 2024-12-13 PROCEDURE — 84443 ASSAY THYROID STIM HORMONE: CPT

## 2024-12-13 PROCEDURE — 36415 COLL VENOUS BLD VENIPUNCTURE: CPT

## 2024-12-13 PROCEDURE — 80053 COMPREHEN METABOLIC PANEL: CPT

## 2024-12-13 PROCEDURE — 77063 BREAST TOMOSYNTHESIS BI: CPT

## 2024-12-13 PROCEDURE — 93970 EXTREMITY STUDY: CPT | Performed by: SURGERY

## 2024-12-13 PROCEDURE — 85025 COMPLETE CBC W/AUTO DIFF WBC: CPT

## 2024-12-13 PROCEDURE — 80061 LIPID PANEL: CPT

## 2024-12-13 PROCEDURE — 83880 ASSAY OF NATRIURETIC PEPTIDE: CPT

## 2024-12-13 PROCEDURE — 86803 HEPATITIS C AB TEST: CPT

## 2024-12-13 NOTE — TELEPHONE ENCOUNTER
Medication: Nystatin Cream    Dose/Frequency: apply topically 2 times a day for 10 days    Quantity: 30g    Pharmacy: [facility]    Office:   [x] PCP/Provider -   [] Speciality/Provider -     Does the patient have enough for 3 days?   [x] Yes   [] No - Send as HP to POD

## 2024-12-14 RX ORDER — NYSTATIN 100000 U/G
CREAM TOPICAL 2 TIMES DAILY
Qty: 30 G | Refills: 0 | Status: SHIPPED | OUTPATIENT
Start: 2024-12-14 | End: 2024-12-15 | Stop reason: SDUPTHER

## 2024-12-15 DIAGNOSIS — B37.2 CANDIDOSIS OF SKIN: ICD-10-CM

## 2024-12-15 RX ORDER — NYSTATIN 100000 U/G
CREAM TOPICAL 2 TIMES DAILY
Qty: 30 G | Refills: 0 | Status: SHIPPED | OUTPATIENT
Start: 2024-12-15 | End: 2024-12-25

## 2024-12-16 ENCOUNTER — TELEPHONE (OUTPATIENT)
Dept: WOUND CARE | Facility: CLINIC | Age: 59
End: 2024-12-16

## 2024-12-16 ENCOUNTER — OFFICE VISIT (OUTPATIENT)
Dept: FAMILY MEDICINE CLINIC | Facility: CLINIC | Age: 59
End: 2024-12-16
Payer: COMMERCIAL

## 2024-12-16 ENCOUNTER — APPOINTMENT (OUTPATIENT)
Dept: RADIOLOGY | Facility: CLINIC | Age: 59
End: 2024-12-16
Payer: COMMERCIAL

## 2024-12-16 VITALS
HEART RATE: 104 BPM | WEIGHT: 217 LBS | TEMPERATURE: 97.6 F | DIASTOLIC BLOOD PRESSURE: 60 MMHG | SYSTOLIC BLOOD PRESSURE: 128 MMHG | OXYGEN SATURATION: 97 % | HEIGHT: 65 IN | BODY MASS INDEX: 36.15 KG/M2

## 2024-12-16 DIAGNOSIS — F17.210 SMOKING GREATER THAN 30 PACK YEARS: ICD-10-CM

## 2024-12-16 DIAGNOSIS — F17.200 SMOKING: ICD-10-CM

## 2024-12-16 DIAGNOSIS — I10 HTN, GOAL BELOW 140/90: Primary | ICD-10-CM

## 2024-12-16 DIAGNOSIS — Z12.11 COLON CANCER SCREENING: ICD-10-CM

## 2024-12-16 DIAGNOSIS — R01.1 HEART MURMUR, SYSTOLIC: ICD-10-CM

## 2024-12-16 DIAGNOSIS — S91.001A WOUND OF RIGHT ANKLE, INITIAL ENCOUNTER: ICD-10-CM

## 2024-12-16 DIAGNOSIS — M54.50 LUMBAR BACK PAIN: ICD-10-CM

## 2024-12-16 DIAGNOSIS — E78.5 DYSLIPIDEMIA, GOAL LDL BELOW 130: ICD-10-CM

## 2024-12-16 DIAGNOSIS — F51.01 PRIMARY INSOMNIA: ICD-10-CM

## 2024-12-16 DIAGNOSIS — E66.01 OBESITY, MORBID (HCC): ICD-10-CM

## 2024-12-16 PROBLEM — L03.115 CELLULITIS OF RIGHT LOWER EXTREMITY: Status: RESOLVED | Noted: 2024-11-09 | Resolved: 2024-12-16

## 2024-12-16 PROBLEM — Z23 ENCOUNTER FOR IMMUNIZATION: Status: RESOLVED | Noted: 2024-11-08 | Resolved: 2024-12-16

## 2024-12-16 PROBLEM — Z00.00 ENCOUNTER FOR MEDICAL EXAMINATION TO ESTABLISH CARE: Status: RESOLVED | Noted: 2024-11-09 | Resolved: 2024-12-16

## 2024-12-16 PROCEDURE — 72100 X-RAY EXAM L-S SPINE 2/3 VWS: CPT

## 2024-12-16 PROCEDURE — G2211 COMPLEX E/M VISIT ADD ON: HCPCS | Performed by: NURSE PRACTITIONER

## 2024-12-16 PROCEDURE — 73610 X-RAY EXAM OF ANKLE: CPT

## 2024-12-16 PROCEDURE — 99213 OFFICE O/P EST LOW 20 MIN: CPT | Performed by: NURSE PRACTITIONER

## 2024-12-16 NOTE — PROGRESS NOTES
Name: Emma Brock      : 1965      MRN: [id number]  Encounter Provider: ALEKSANDER Anaya  Encounter Date: 2024   Encounter department: [facility]  :  Assessment & Plan  Colon cancer screening    Orders:  •  Cologuard    HTN, goal below 140/90  Well controlled        Smoking greater than 30 pack years  Still smoking not ready to stop        Smoking         Dyslipidemia, goal LDL below 130  Slightly elevated  will work on her diet        Heart murmur, systolic  TTE is pending        Obesity, morbid (HCC)  Has lost 10 pounds since last OV       Primary insomnia  Sleeping better on the seroquel 25 mg at hs        Lumbar back pain    Orders:  •  XR spine lumbar 2 or 3 views injury; Future    DW patient suspect muscular        History of Present Illness     Patient here today for her follow up on her recent labs and testing. Patient was out in her garage and getting tree from the garage and then when she turned felt like a knife in her back and next day was having some pain and soreness this morning in her back she has a history of back issues but has been good until tree incident.       Review of Systems   Constitutional:  Negative for activity change, appetite change, chills, diaphoresis, fatigue, fever and unexpected weight change.   HENT:  Negative for congestion, ear pain, hearing loss, postnasal drip, sinus pressure, sinus pain, sneezing and sore throat.    Eyes:  Negative for pain, redness and visual disturbance.   Respiratory:  Negative for cough and shortness of breath.    Cardiovascular:  Positive for leg swelling. Negative for chest pain.   Gastrointestinal:  Negative for abdominal pain, diarrhea, nausea and vomiting.   Endocrine: Negative.    Genitourinary: Negative.    Musculoskeletal:  Positive for back pain. Negative for arthralgias.   Skin:  Positive for wound.   Allergic/Immunologic: Negative.    Neurological:  Negative for dizziness and  "light-headedness.   Hematological: Negative.    Psychiatric/Behavioral:  Negative for behavioral problems and dysphoric mood.        Objective   /60   Pulse 104   Temp 97.6 °F (36.4 °C)   Ht 5' 5\" (1.651 m)   Wt 98.4 kg (217 lb)   LMP  (LMP Unknown)   SpO2 97%   BMI 36.11 kg/m²      Physical Exam  Constitutional:       General: She is not in acute distress.     Appearance: She is well-developed.   HENT:      Head: Normocephalic and atraumatic.      Right Ear: Tympanic membrane normal.      Left Ear: Tympanic membrane normal.      Nose: Nose normal.      Mouth/Throat:      Mouth: Mucous membranes are moist.   Eyes:      Pupils: Pupils are equal, round, and reactive to light.   Neck:      Thyroid: No thyromegaly.   Cardiovascular:      Rate and Rhythm: Normal rate and regular rhythm.      Heart sounds: Normal heart sounds. No murmur heard.  Pulmonary:      Effort: Pulmonary effort is normal. No respiratory distress.      Breath sounds: Normal breath sounds. No wheezing.   Abdominal:      General: Bowel sounds are normal.      Palpations: Abdomen is soft.   Musculoskeletal:         General: Normal range of motion.      Cervical back: Normal range of motion.        Back:    Skin:     General: Skin is warm and dry.   Neurological:      General: No focal deficit present.      Mental Status: She is alert and oriented to person, place, and time.   Psychiatric:         Mood and Affect: Mood normal.         "

## 2024-12-16 NOTE — TELEPHONE ENCOUNTER
Called to offer patient a sooner appointment. Spoke with . Patient is in another appointment. Patients  agreeable to 12/18/24 at 8am. Appointment rescheduled.

## 2024-12-17 ENCOUNTER — RESULTS FOLLOW-UP (OUTPATIENT)
Dept: FAMILY MEDICINE CLINIC | Facility: CLINIC | Age: 59
End: 2024-12-17

## 2024-12-18 ENCOUNTER — OFFICE VISIT (OUTPATIENT)
Dept: WOUND CARE | Facility: CLINIC | Age: 59
End: 2024-12-18
Payer: COMMERCIAL

## 2024-12-18 VITALS
SYSTOLIC BLOOD PRESSURE: 146 MMHG | HEIGHT: 65 IN | RESPIRATION RATE: 18 BRPM | WEIGHT: 217 LBS | HEART RATE: 96 BPM | BODY MASS INDEX: 36.15 KG/M2 | TEMPERATURE: 98.6 F | DIASTOLIC BLOOD PRESSURE: 81 MMHG

## 2024-12-18 DIAGNOSIS — T81.89XA NON-HEALING SURGICAL WOUND, INITIAL ENCOUNTER: ICD-10-CM

## 2024-12-18 DIAGNOSIS — Z72.0 TOBACCO ABUSE: ICD-10-CM

## 2024-12-18 DIAGNOSIS — R60.0 BILATERAL EDEMA OF LOWER EXTREMITY: Primary | ICD-10-CM

## 2024-12-18 PROCEDURE — 99213 OFFICE O/P EST LOW 20 MIN: CPT | Performed by: ORTHOPAEDIC SURGERY

## 2024-12-18 PROCEDURE — 11042 DBRDMT SUBQ TIS 1ST 20SQCM/<: CPT | Performed by: ORTHOPAEDIC SURGERY

## 2024-12-18 PROCEDURE — 99204 OFFICE O/P NEW MOD 45 MIN: CPT | Performed by: ORTHOPAEDIC SURGERY

## 2024-12-18 RX ORDER — LIDOCAINE 40 MG/G
CREAM TOPICAL ONCE
Status: COMPLETED | OUTPATIENT
Start: 2024-12-18 | End: 2024-12-18

## 2024-12-18 RX ADMIN — LIDOCAINE: 40 CREAM TOPICAL at 08:04

## 2024-12-18 NOTE — PATIENT INSTRUCTIONS
Orders Placed This Encounter   Procedures    Wound cleansing and dressings Surgical Anterior;Right Ankle     RIGHT ANKLE WOUND    Wash your hands with soap and water.  Remove old dressing, discard into plastic bag and place in trash.  Cleanse the wound with Normal Saline prior to applying a clean dressing. Do not use tissue or cotton balls. Do not scrub the wound. Pat dry using gauze.    Shower: No. This wound cannot get wet. You may shower if you are able to cover with a CAST COVER.     Apply Calmoseptine to skin surrounding wound. (Calmoseptine can be obtained over the counter at the pharmacy. It is also sold on [employer]. This is not a prescription)  Apply Silver Alginate to the open wound.    Cover with Gauze and ABD.  Secure with Dahiana and Tape.     Change dressing EVERY DAY.     Elevate your legs above heart level as much as possible.     Standing Status:   Future     Expiration Date:   12/25/2024

## 2024-12-18 NOTE — PROGRESS NOTES
Wound Procedure Treatment Surgical Anterior;Right Ankle    Performed by: Leann Orozco RN  Authorized by: Carole Ruiz PA-C    Associated wounds:   Wound 12/18/24 Surgical Ankle Anterior;Right  Wound cleansed with:  Dakin's 0.25%  Applied to periwound:  Calmoseptine  Applied primary dressing:  Calcium alginate and Silver  Applied secondary dressing:  ABD and Gauze  Dressing secured with:  Dahiana and Tape

## 2024-12-18 NOTE — PROGRESS NOTES
Patient ID: Emma Brock is a 59 y.o. female Date of Birth 1965       Chief Complaint   Patient presents with    New Patient Visit     RIGHT ANKLE WOUND       Allergies:  Morphine, Prednisone, and Trazodone    Diagnosis:      Diagnosis ICD-10-CM Associated Orders   1. Bilateral edema of lower extremity  R60.0 VAS ARTERIAL DUPLEX- LOWER LIMB BILATERAL     VAS Lower extremity venous insufficiency duplex, bilateral w/ measurements     Ambulatory Referral to Vascular Surgery      2. Non-healing surgical wound, initial encounter  T81.89XA Ambulatory Referral to Wound Care     lidocaine (LMX) 4 % cream     Wound cleansing and dressings Surgical Anterior;Right Ankle     Wound Procedure Treatment Surgical Anterior;Right Ankle     VAS ARTERIAL DUPLEX- LOWER LIMB BILATERAL     VAS Lower extremity venous insufficiency duplex, bilateral w/ measurements     Ambulatory Referral to Vascular Surgery      3. Tobacco abuse  Z72.0 VAS ARTERIAL DUPLEX- LOWER LIMB BILATERAL     VAS Lower extremity venous insufficiency duplex, bilateral w/ measurements     Ambulatory Referral to Vascular Surgery              Assessment and Plan :  Initial Evaluation of nonhealing surgical wound on Right ankle s/p ORIF. No signs of infection today.  Debrided as below  Wound management with silver alginate to wound bed and Calmoseptine to wound edges, see wound orders below   No compression due to h/o R DVT   No harsh cleansers such as alcohol, peroxide, or antibacterial soap, do not submerge in water such as bathtub, hot tub, swimming pool, ocean, etc.   Can cleanse with NSS at dressing changes.   Discontinue Nystatin cream   Counseled on importance of frequent elevation of leg and increase exercise/walking for wound healing.  Counseled on smoking cessation.  Counseled on adequate protein intake (chicken, fish, yogurt, eggs and nuts), 3-4 servings per day. Recommend Adrián, nutritional supplement twice a day to expedite wound healing. Shared  information and coupon with patient.  Follow up with vascular studies as ordered.  Referred to vascular for evaluation and possible intervention for venous disease due to tobacco abuse and non-healing surgical wound.  Followup in 1 week(s) or call sooner with questions or concerns or any signs of infection such as redness, swelling, increased/purulent drainage, fever, chills, increased severe pain.     Subjective:   12/18: Patient is a 59 y.o. female with pmhx HTN, HLD, Tobacco Abuse, chronic back pain, h/o Tonsillar cancer, Obesity, Continuous opioid dependence, h/o RLE DVT, Heart murmur, Bilateral lower extremity edema and insomnia who presents for initial evaluation of nonhealing surgical wound on R medial ankle which has been present since 6/21/21 s/p ORIF of bimalleolar ankle. Since then pt was treated with a 7 day course of Keflex for positive wound cultures for 1+ Growth of Candida parapsilosis and 1+ Growth of Staphylococcus coagulase negative. Pt has been applying Nystatin cream on the wound bed and leaving it open to air. Does not have an odor. No diabetes.  No ETOH or drug use. Pt denies any sob, fatigue, N/V, CP, fevers or chills.    Pt is accompanied by her daughter who is actively involved in her care.        The following portions of the patient's history were reviewed and updated as appropriate:   Patient Active Problem List   Diagnosis    Smoking    Back pain, chronic    Dyslipidemia, goal LDL below 130    Tonsillar cancer (HCC)    HTN, goal below 140/90    Obesity, morbid (HCC)    Continuous opioid dependence (HCC)    Heart murmur, systolic    Wound of right ankle    Smoking greater than 30 pack years    Bilateral lower extremity edema    Encounter for screening mammogram for malignant neoplasm of breast    Primary insomnia     Past Medical History:   Diagnosis Date    Acute encephalopathy 06/26/2021    Bimalleolar fracture of right ankle 06/11/2021    Cancer (HCC)     Chronic back pain     HTN  (hypertension) 06/21/2021    Hypertension     Migraine     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    Non Hodgkin's lymphoma (HCC)     Right leg DVT (HCC) 09/19/2013     Past Surgical History:   Procedure Laterality Date    HYSTERECTOMY      MT OPEN TREATMENT BIMALLEOLAR ANKLE FRACTURE Right 6/21/2021    Procedure: OPEN REDUCTION W/ INTERNAL FIXATION (ORIF) RIGHT ANKLE BIMALLEOLAR FRACTURE;  Surgeon: Joe Bill MD;  Location: [facility];  Service: Orthopedics    TONSILLECTOMY       Family History   Problem Relation Age of Onset    Lung cancer Mother     Skin cancer Father     Cancer Maternal Grandmother         gastro    Breast cancer Neg Hx     Ovarian cancer Neg Hx       Social History     Socioeconomic History    Marital status: /Civil Union     Spouse name: None    Number of children: None    Years of education: None    Highest education level: None   Occupational History    None   Tobacco Use    Smoking status: Every Day     Current packs/day: 0.50     Types: Cigarettes    Smokeless tobacco: Never   Vaping Use    Vaping status: Never Used   Substance and Sexual Activity    Alcohol use: Not Currently    Drug use: Never    Sexual activity: None   Other Topics Concern    None   Social History Narrative    None     Social Drivers of Health     Financial Resource Strain: Not on file   Food Insecurity: Patient Declined (11/8/2024)    Hunger Vital Sign     Worried About Running Out of Food in the Last Year: Patient declined     Ran Out of Food in the Last Year: Patient declined   Transportation Needs: No Transportation Needs (11/8/2024)    PRAPARE - Transportation     Lack of Transportation (Medical): No     Lack of Transportation (Non-Medical): No   Physical Activity: Not on file   Stress: Not on file   Social Connections: Unknown (6/18/2024)    Received from [employer]    Social Connections     How often do you feel lonely or isolated from those around you? (Adult - for ages 18 years and over): Not on file   Intimate  "Partner Violence: Not on file   Housing Stability: Low Risk  (11/8/2024)    Housing Stability Vital Sign     Unable to Pay for Housing in the Last Year: No     Number of Times Moved in the Last Year: 1     Homeless in the Last Year: No        Current Outpatient Medications:     aspirin (ECOTRIN LOW STRENGTH) 81 mg EC tablet, Take 162 mg by mouth daily, Disp: , Rfl:     buprenorphine-naloxone (SUBOXONE) 8-2 mg per SL tablet, place 1 tablet under the tongue and ALLOW TO dissolve once daily, Disp: , Rfl:     furosemide (LASIX) 20 mg tablet, take 1 tablet by mouth once daily, Disp: 90 tablet, Rfl: 5    nystatin (MYCOSTATIN) cream, Apply topically 2 (two) times a day for 10 days, Disp: 30 g, Rfl: 0    potassium chloride (Klor-Con M20) 20 mEq tablet, take 1 tablet by mouth once daily, Disp: 90 tablet, Rfl: 3    QUEtiapine (SEROquel) 25 mg tablet, take 1 tablet by mouth at bedtime, Disp: 90 tablet, Rfl: 2  No current facility-administered medications for this visit.    Review of Systems   Constitutional:  Negative for appetite change, chills, fatigue, fever and unexpected weight change.   HENT:  Negative for congestion, hearing loss and postnasal drip.    Respiratory:  Negative for cough and shortness of breath.    Cardiovascular:  Positive for leg swelling.   Skin:  Positive for wound (R medial ankle). Negative for rash.   Neurological:  Negative for numbness.   Hematological:  Does not bruise/bleed easily.   Psychiatric/Behavioral: Negative.         Objective:  /81   Pulse 96   Temp 98.6 °F (37 °C)   Resp 18   Ht 5' 5\" (1.651 m)   Wt 98.4 kg (217 lb)   LMP  (LMP Unknown)   BMI 36.11 kg/m²   Pain Score: 0-No pain     Physical Exam  Vitals reviewed.   Constitutional:       General: She is not in acute distress.     Appearance: Normal appearance. She is well-developed. She is obese.   HENT:      Head: Normocephalic and atraumatic.   Cardiovascular:      Rate and Rhythm: Normal rate.      Pulses:           " "Dorsalis pedis pulses are 2+ on the right side.        Posterior tibial pulses are 2+ on the right side.   Pulmonary:      Effort: Pulmonary effort is normal.   Musculoskeletal:         General: No deformity.      Right lower leg: Edema present.      Left lower leg: Edema present.        Feet:    Feet:      Comments: Yellow adherent slough with exudate and yellow drainage with macerated wound edges.   Skin:     General: Skin is warm and dry.      Findings: Wound (R medial ankle) present.      Comments: See wound assessment   Neurological:      General: No focal deficit present.      Mental Status: She is alert and oriented to person, place, and time.   Psychiatric:         Mood and Affect: Mood and affect normal.         Behavior: Behavior normal. Behavior is cooperative.                Debridement   Wound 12/18/24 Surgical Ankle Anterior;Right    Universal Protocol:  procedure performed by consultantConsent: Verbal consent obtained. Written consent obtained.  Risks and benefits: risks, benefits and alternatives were discussed  Consent given by: patient  Time out: Immediately prior to procedure a \"time out\" was called to verify the correct patient, procedure, equipment, support staff and site/side marked as required.  Patient understanding: patient states understanding of the procedure being performed  Patient identity confirmed: verbally with patient    Debridement Details  Performed by: PA  Debridement type: surgical  Level of debridement: subcutaneous tissue  Pain control: lidocaine 4%      Post-debridement measurements  Length (cm): 2.8  Width (cm): 1.7  Depth (cm): 0.2  Percent debrided: 100%  Surface Area (cm^2): 4.76  Area Debrided (cm^2): 4.76  Volume (cm^3): 0.95    Tissue and other material debrided: subcutaneous tissue  Devitalized tissue debrided: biofilm, exudate and slough  Instrument(s) utilized: curette  Bleeding: small  Hemostasis obtained with: pressure  Procedural pain (0-10): 0  Post-procedural " pain: 0   Response to treatment: procedure was tolerated well         Results from last 6 Months   Lab Units 11/08/24  1549   WOUND CULTURE  1+ Growth of Candida parapsilosis*  1+ Growth of Staphylococcus coagulase negative*       Wound Instructions:  Orders Placed This Encounter   Procedures    Wound cleansing and dressings Surgical Anterior;Right Ankle     RIGHT ANKLE WOUND    Wash your hands with soap and water.  Remove old dressing, discard into plastic bag and place in trash.  Cleanse the wound with Normal Saline prior to applying a clean dressing. Do not use tissue or cotton balls. Do not scrub the wound. Pat dry using gauze.    Shower: No. This wound cannot get wet. You may shower if you are able to cover with a CAST COVER.     Apply Calmoseptine to skin surrounding wound. (Calmoseptine can be obtained over the counter at the pharmacy. It is also sold on [employer]. This is not a prescription)  Apply Silver Alginate to the open wound.    Cover with Gauze and ABD.  Secure with Dahiana and Tape.     Change dressing EVERY DAY.     Elevate your legs above heart level as much as possible.     Standing Status:   Future     Expiration Date:   12/25/2024    Wound Procedure Treatment Surgical Anterior;Right Ankle     This order was created via procedure documentation    Debridement     This order was created via procedure documentation    Ambulatory Referral to Vascular Surgery     Standing Status:   Future     Expiration Date:   12/18/2025     Referral Priority:   Routine     Referral Type:   Consult - AMB     Referral Reason:   Specialty Services Required     Requested Specialty:   Vascular Surgery     Number of Visits Requested:   1     Expiration Date:   12/18/2025       Total time spent today:  45 minutes.  This includes reviewing the patient's chart, pertinent physician records [facility], Family [facility], 12/16/24, 11/8/24, Dr. Bill, 6/21/21, Wound culture on 11/8/24 and R ankle xray 12/16/24.      Carole Ruiz,  "TERRY MISHRA    Portions of the record may have been created with voice recognition software. Occasional wrong word or \"sound alike\" substitutions may have occurred due to the inherent limitations of voice recognition software. Read the chart carefully and recognize, using context, where substitutions have occurred.    "

## 2024-12-23 ENCOUNTER — OFFICE VISIT (OUTPATIENT)
Dept: WOUND CARE | Facility: CLINIC | Age: 59
End: 2024-12-23
Payer: COMMERCIAL

## 2024-12-23 VITALS — TEMPERATURE: 97.7 F | RESPIRATION RATE: 18 BRPM

## 2024-12-23 DIAGNOSIS — Z72.0 TOBACCO ABUSE: ICD-10-CM

## 2024-12-23 DIAGNOSIS — R60.0 BILATERAL EDEMA OF LOWER EXTREMITY: ICD-10-CM

## 2024-12-23 DIAGNOSIS — T81.89XA NON-HEALING SURGICAL WOUND, INITIAL ENCOUNTER: Primary | ICD-10-CM

## 2024-12-23 PROCEDURE — 11042 DBRDMT SUBQ TIS 1ST 20SQCM/<: CPT | Performed by: ORTHOPAEDIC SURGERY

## 2024-12-23 RX ORDER — LIDOCAINE 40 MG/G
CREAM TOPICAL ONCE
Status: COMPLETED | OUTPATIENT
Start: 2024-12-23 | End: 2024-12-23

## 2024-12-23 RX ADMIN — LIDOCAINE: 40 CREAM TOPICAL at 08:08

## 2024-12-23 NOTE — PROGRESS NOTES
Wound Procedure Treatment Surgical Anterior;Right Ankle    Performed by: Leann Orozco RN  Authorized by: Carole Ruiz PA-C    Associated wounds:   Wound 12/18/24 Surgical Ankle Anterior;Right  Wound cleansed with:  NSS  Applied primary dressing:  Gelling fiber and Silver  Applied secondary dressing:  ABD and Gauze  Dressing secured with:  Dahiana and Tape

## 2024-12-23 NOTE — PROGRESS NOTES
Patient ID: Emma Brock is a 59 y.o. female Date of Birth 1965       Chief Complaint   Patient presents with    Follow Up Wound Care Visit     RIGHT ANKLE WOUND       Allergies:  Morphine, Prednisone, and Trazodone    Diagnosis:   Diagnosis ICD-10-CM Associated Orders   1. Non-healing surgical wound, initial encounter  T81.89XA lidocaine (LMX) 4 % cream     Wound cleansing and dressings Surgical Anterior;Right Ankle     Wound Procedure Treatment Surgical Anterior;Right Ankle     Debridement      2. Bilateral edema of lower extremity  R60.0       3. Tobacco abuse  Z72.0            Assessment and Plan :  Follow up evaluation of nonhealing surgical wound on Right ankle s/p ORIF progressively healing.   Debrided as below  Change wound management to Exufiber Ag to wound bed and Calmoseptine to wound edges, see wound orders below.  No harsh cleansers such as alcohol, peroxide, or antibacterial soap, do not submerge in water such as bathtub, hot tub, swimming pool, ocean, etc.   Can cleanse with mild soap and water or NSS at dressing changes.   Continue frequent elevation of leg and increase exercise/walking for wound healing.  Counseled on smoking cessation.  Continue adequate protein intake (chicken, fish, yogurt, eggs and nuts), 3-4 servings per day. Follow up with vascular studies as ordered.  Follow up with vascular for evaluation and possible intervention for venous disease due to tobacco abuse and non-healing surgical wound.  Follow-up in 1 week(s) or call sooner with questions or concerns or any signs of infection such as redness, swelling, increased/purulent drainage, fever, chills, increased severe pain.     Subjective:   12/18: Patient is a 59 y.o. female with pmhx HTN, HLD, Tobacco Abuse, chronic back pain, h/o Tonsillar cancer, Obesity, Continuous opioid dependence, h/o RLE DVT, Heart murmur, Bilateral lower extremity edema and insomnia who presents for initial evaluation of nonhealing surgical  wound on R medial ankle which has been present since 6/21/21 s/p ORIF of bimalleolar ankle. Since then pt was treated with a 7 day course of Keflex for positive wound cultures for 1+ Growth of Candida parapsilosis and 1+ Growth of Staphylococcus coagulase negative. Pt has been applying Nystatin cream on the wound bed and leaving it open to air. Does not have an odor. No diabetes.  No ETOH or drug use. Pt denies any sob, fatigue, N/V, CP, fevers or chills.    Pt is accompanied by her daughter who is actively involved in her care.    12/23: Patient presents for followup evaluation of nonhealing surgical wound on Right ankle s/p ORIF accompanied by her daughter.  Pt states dressing has been adhering to wound bed. No increased pain or drainage.  Has been using Silver alginate on the wound bed with and Calmoseptine to periwound. Patient is using her compression pumps twice daily.  Pt denies any fevers or chills.      The following portions of the patient's history were reviewed and updated as appropriate:   Patient Active Problem List   Diagnosis    Smoking    Back pain, chronic    Dyslipidemia, goal LDL below 130    Tonsillar cancer (HCC)    HTN, goal below 140/90    Obesity, morbid (HCC)    Continuous opioid dependence (HCC)    Heart murmur, systolic    Wound of right ankle    Smoking greater than 30 pack years    Bilateral lower extremity edema    Encounter for screening mammogram for malignant neoplasm of breast    Primary insomnia     Past Medical History:   Diagnosis Date    Acute encephalopathy 06/26/2021    Bimalleolar fracture of right ankle 06/11/2021    Cancer (HCC)     Chronic back pain     HTN (hypertension) 06/21/2021    Hypertension     Migraine     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    Non Hodgkin's lymphoma (HCC)     Right leg DVT (HCC) 09/19/2013     Past Surgical History:   Procedure Laterality Date    HYSTERECTOMY      OR OPEN TREATMENT BIMALLEOLAR ANKLE FRACTURE Right 6/21/2021    Procedure: OPEN REDUCTION W/  INTERNAL FIXATION (ORIF) RIGHT ANKLE BIMALLEOLAR FRACTURE;  Surgeon: Joe Bill MD;  Location: [facility];  Service: Orthopedics    TONSILLECTOMY       Family History   Problem Relation Age of Onset    Lung cancer Mother     Skin cancer Father     Cancer Maternal Grandmother         gastro    Breast cancer Neg Hx     Ovarian cancer Neg Hx      Social History     Socioeconomic History    Marital status: /Civil Union     Spouse name: None    Number of children: None    Years of education: None    Highest education level: None   Occupational History    None   Tobacco Use    Smoking status: Every Day     Current packs/day: 0.50     Types: Cigarettes    Smokeless tobacco: Never   Vaping Use    Vaping status: Never Used   Substance and Sexual Activity    Alcohol use: Not Currently    Drug use: Never    Sexual activity: None   Other Topics Concern    None   Social History Narrative    None     Social Drivers of Health     Financial Resource Strain: Not on file   Food Insecurity: Patient Declined (11/8/2024)    Hunger Vital Sign     Worried About Running Out of Food in the Last Year: Patient declined     Ran Out of Food in the Last Year: Patient declined   Transportation Needs: No Transportation Needs (11/8/2024)    PRAPARE - Transportation     Lack of Transportation (Medical): No     Lack of Transportation (Non-Medical): No   Physical Activity: Not on file   Stress: Not on file   Social Connections: Unknown (6/18/2024)    Received from [employer]    Social Connections     How often do you feel lonely or isolated from those around you? (Adult - for ages 18 years and over): Not on file   Intimate Partner Violence: Not on file   Housing Stability: Low Risk  (11/8/2024)    Housing Stability Vital Sign     Unable to Pay for Housing in the Last Year: No     Number of Times Moved in the Last Year: 1     Homeless in the Last Year: No       Current Outpatient Medications:     aspirin (ECOTRIN LOW STRENGTH) 81 mg EC  tablet, Take 162 mg by mouth daily, Disp: , Rfl:     buprenorphine-naloxone (SUBOXONE) 8-2 mg per SL tablet, place 1 tablet under the tongue and ALLOW TO dissolve once daily, Disp: , Rfl:     furosemide (LASIX) 20 mg tablet, take 1 tablet by mouth once daily, Disp: 90 tablet, Rfl: 5    nystatin (MYCOSTATIN) cream, Apply topically 2 (two) times a day for 10 days, Disp: 30 g, Rfl: 0    potassium chloride (Klor-Con M20) 20 mEq tablet, take 1 tablet by mouth once daily, Disp: 90 tablet, Rfl: 3    QUEtiapine (SEROquel) 25 mg tablet, take 1 tablet by mouth at bedtime, Disp: 90 tablet, Rfl: 2  No current facility-administered medications for this visit.    Review of Systems   Constitutional:  Negative for appetite change, chills, fatigue, fever and unexpected weight change.   HENT:  Negative for congestion, hearing loss and postnasal drip.    Respiratory:  Negative for cough and shortness of breath.    Cardiovascular:  Positive for leg swelling.   Skin:  Positive for wound (R medial ankle). Negative for rash.   Neurological:  Negative for numbness.   Hematological:  Does not bruise/bleed easily.   Psychiatric/Behavioral: Negative.           Objective:  Temp 97.7 °F (36.5 °C)   Resp 18   LMP  (LMP Unknown)   Pain Score:   1     Physical Exam  Vitals reviewed.   Constitutional:       General: She is not in acute distress.     Appearance: Normal appearance. She is well-developed. She is obese.   HENT:      Head: Normocephalic and atraumatic.   Cardiovascular:      Rate and Rhythm: Normal rate.      Pulses:           Dorsalis pedis pulses are 2+ on the right side.        Posterior tibial pulses are 2+ on the right side.   Pulmonary:      Effort: Pulmonary effort is normal.   Musculoskeletal:         General: No deformity.      Right lower leg: Edema present.      Left lower leg: Edema present.        Feet:    Feet:      Comments: Yellow adherent slough with exudate and yellow drainage, see wound assessment.  Skin:      "General: Skin is warm and dry.      Findings: Wound (R medial ankle) present.   Neurological:      General: No focal deficit present.      Mental Status: She is alert and oriented to person, place, and time.   Psychiatric:         Mood and Affect: Mood and affect normal.         Behavior: Behavior normal. Behavior is cooperative.              Wound 12/18/24 Surgical Ankle Anterior;Right (Active)   Wound Description Slough;Yellow;Pink 12/23/24 0804   Vernell-wound Assessment Erythema;Edema;Fragile 12/23/24 0804   Wound Length (cm) 2.7 cm 12/23/24 0804   Wound Width (cm) 1.4 cm 12/23/24 0804   Wound Depth (cm) 0.1 cm 12/23/24 0804   Wound Surface Area (cm^2) 3.78 cm^2 12/23/24 0804   Wound Volume (cm^3) 0.378 cm^3 12/23/24 0804   Calculated Wound Volume (cm^3) 0.38 cm^3 12/23/24 0804   Change in Wound Size % 20.83 12/23/24 0804   Drainage Amount Moderate 12/23/24 0804   Drainage Description Serosanguineous;Yellow 12/23/24 0804   Non-staged Wound Description Full thickness 12/23/24 0804   Dressing Status Intact 12/23/24 0804      Debridement   Wound 12/18/24 Surgical Ankle Anterior;Right    Universal Protocol:  procedure performed by consultantConsent: Verbal consent obtained. Written consent obtained.  Risks and benefits: risks, benefits and alternatives were discussed  Consent given by: patient  Time out: Immediately prior to procedure a \"time out\" was called to verify the correct patient, procedure, equipment, support staff and site/side marked as required.  Patient understanding: patient states understanding of the procedure being performed  Patient identity confirmed: verbally with patient    Debridement Details  Performed by: PA  Debridement type: surgical  Level of debridement: subcutaneous tissue  Pain control: lidocaine 4%      Post-debridement measurements  Length (cm): 2.7  Width (cm): 1.4  Depth (cm): 0.2  Percent debrided: 100%  Surface Area (cm^2): 3.78  Area Debrided (cm^2): 3.78  Volume (cm^3): " "0.76    Tissue and other material debrided: subcutaneous tissue  Devitalized tissue debrided: biofilm, exudate and slough  Instrument(s) utilized: curette  Bleeding: small  Hemostasis obtained with: pressure  Procedural pain (0-10): 0  Post-procedural pain: 0   Response to treatment: procedure was tolerated well         Results from last 6 Months   Lab Units 11/08/24  7904   WOUND CULTURE  1+ Growth of Candida parapsilosis*  1+ Growth of Staphylococcus coagulase negative*         Wound Instructions:  Orders Placed This Encounter   Procedures    Wound cleansing and dressings Surgical Anterior;Right Ankle     RIGHT ANKLE WOUND     Wash your hands with soap and water.  Remove old dressing, discard into plastic bag and place in trash.  Cleanse the wound with Normal Saline prior to applying a clean dressing. Do not use tissue or cotton balls. Do not scrub the wound. Pat dry using gauze.     Shower: No. This wound cannot get wet. You may shower if you are able to cover with a CAST COVER.      Apply Calmoseptine to skin surrounding wound.  Apply Exufiber AG to the open wound.    Cover with Gauze and ABD.  Secure with Dahiana and Tape.      Change dressing EVERY DAY.      Elevate your legs above heart level as much as possible.     Standing Status:   Future     Expiration Date:   12/30/2024    Wound Procedure Treatment Surgical Anterior;Right Ankle     This order was created via procedure documentation    Debridement     This order was created via procedure documentation       Carole Ruiz PA-C, [facility]      Portions of the record may have been created with voice recognition software. Occasional wrong word or \"sound alike\" substitutions may have occurred due to the inherent limitations of voice recognition software. Read the chart carefully and recognize, using context, where substitutions have occurred.      "

## 2024-12-23 NOTE — PATIENT INSTRUCTIONS
Orders Placed This Encounter   Procedures    Wound cleansing and dressings Surgical Anterior;Right Ankle     RIGHT ANKLE WOUND     Wash your hands with soap and water.  Remove old dressing, discard into plastic bag and place in trash.  Cleanse the wound with Normal Saline prior to applying a clean dressing. Do not use tissue or cotton balls. Do not scrub the wound. Pat dry using gauze.     Shower: No. This wound cannot get wet. You may shower if you are able to cover with a CAST COVER.      Apply Calmoseptine to skin surrounding wound.  Apply Exufiber AG to the open wound.    Cover with Gauze and ABD.  Secure with Dahiana and Tape.      Change dressing EVERY DAY.      Elevate your legs above heart level as much as possible.     Standing Status:   Future     Expiration Date:   12/30/2024

## 2024-12-26 LAB — COLOGUARD RESULT REPORTABLE: NEGATIVE

## 2024-12-30 ENCOUNTER — HOSPITAL ENCOUNTER (OUTPATIENT)
Dept: NON INVASIVE DIAGNOSTICS | Facility: HOSPITAL | Age: 59
Discharge: HOME | End: 2024-12-30
Payer: COMMERCIAL

## 2024-12-30 ENCOUNTER — OFFICE VISIT (OUTPATIENT)
Dept: WOUND CARE | Facility: CLINIC | Age: 59
End: 2024-12-30
Payer: COMMERCIAL

## 2024-12-30 VITALS
BODY MASS INDEX: 36.15 KG/M2 | HEIGHT: 65 IN | SYSTOLIC BLOOD PRESSURE: 140 MMHG | DIASTOLIC BLOOD PRESSURE: 63 MMHG | HEART RATE: 73 BPM | WEIGHT: 217 LBS

## 2024-12-30 VITALS
TEMPERATURE: 97.3 F | HEART RATE: 77 BPM | RESPIRATION RATE: 18 BRPM | DIASTOLIC BLOOD PRESSURE: 63 MMHG | SYSTOLIC BLOOD PRESSURE: 140 MMHG

## 2024-12-30 DIAGNOSIS — I10 HTN, GOAL BELOW 140/90: ICD-10-CM

## 2024-12-30 DIAGNOSIS — R60.0 BILATERAL EDEMA OF LOWER EXTREMITY: ICD-10-CM

## 2024-12-30 DIAGNOSIS — R01.1 HEART MURMUR, SYSTOLIC: ICD-10-CM

## 2024-12-30 DIAGNOSIS — R60.0 BILATERAL LOWER EXTREMITY EDEMA: ICD-10-CM

## 2024-12-30 DIAGNOSIS — Z72.0 TOBACCO ABUSE: ICD-10-CM

## 2024-12-30 DIAGNOSIS — T81.89XA NON-HEALING SURGICAL WOUND, INITIAL ENCOUNTER: Primary | ICD-10-CM

## 2024-12-30 LAB
AORTIC ROOT: 2.7 CM
APICAL FOUR CHAMBER EJECTION FRACTION: 62 %
ASCENDING AORTA: 2.9 CM
BSA FOR ECHO PROCEDURE: 2.05 M2
DOP CALC LVOT AREA: 2.54 CM2
DOP CALC LVOT DIAMETER: 1.8 CM
E WAVE DECELERATION TIME: 240 MS
E/A RATIO: 0.99
FRACTIONAL SHORTENING: 38 (ref 28–44)
INTERVENTRICULAR SEPTUM IN DIASTOLE (PARASTERNAL SHORT AXIS VIEW): 0.9 CM
INTERVENTRICULAR SEPTUM: 0.9 CM (ref 0.6–1.1)
LAAS-AP2: 23.7 CM2
LAAS-AP4: 22.8 CM2
LEFT ATRIUM SIZE: 3.9 CM
LEFT ATRIUM VOLUME (MOD BIPLANE): 76 ML
LEFT ATRIUM VOLUME INDEX (MOD BIPLANE): 37.1 ML/M2
LEFT INTERNAL DIMENSION IN SYSTOLE: 3 CM (ref 2.1–4)
LEFT VENTRICULAR INTERNAL DIMENSION IN DIASTOLE: 4.8 CM (ref 3.5–6)
LEFT VENTRICULAR POSTERIOR WALL IN END DIASTOLE: 0.8 CM
LEFT VENTRICULAR STROKE VOLUME: 69 ML
LVSV (TEICH): 69 ML
MV E'TISSUE VEL-LAT: 12 CM/S
MV E'TISSUE VEL-SEP: 11 CM/S
MV PEAK A VEL: 1.05 M/S
MV PEAK E VEL: 104 CM/S
MV STENOSIS PRESSURE HALF TIME: 70 MS
MV VALVE AREA P 1/2 METHOD: 3.14
RIGHT ATRIUM AREA SYSTOLE A4C: 16.1 CM2
RIGHT VENTRICLE ID DIMENSION: 3.7 CM
SL CV LEFT ATRIUM LENGTH A2C: 5.8 CM
SL CV LV EF: 60
SL CV PED ECHO LEFT VENTRICLE DIASTOLIC VOLUME (MOD BIPLANE) 2D: 106 ML
SL CV PED ECHO LEFT VENTRICLE SYSTOLIC VOLUME (MOD BIPLANE) 2D: 36 ML
TR MAX PG: 26 MMHG
TR PEAK VELOCITY: 2.5 M/S
TRICUSPID ANNULAR PLANE SYSTOLIC EXCURSION: 2.4 CM
TRICUSPID VALVE PEAK REGURGITATION VELOCITY: 2.53 M/S

## 2024-12-30 PROCEDURE — 93306 TTE W/DOPPLER COMPLETE: CPT | Performed by: INTERNAL MEDICINE

## 2024-12-30 PROCEDURE — 11042 DBRDMT SUBQ TIS 1ST 20SQCM/<: CPT | Performed by: ORTHOPAEDIC SURGERY

## 2024-12-30 PROCEDURE — 93306 TTE W/DOPPLER COMPLETE: CPT

## 2024-12-30 RX ORDER — LIDOCAINE 40 MG/G
CREAM TOPICAL ONCE
Status: COMPLETED | OUTPATIENT
Start: 2024-12-30 | End: 2024-12-30

## 2024-12-30 RX ADMIN — LIDOCAINE: 40 CREAM TOPICAL at 08:05

## 2024-12-30 NOTE — PROGRESS NOTES
Patient ID: Emma Brock is a 59 y.o. female Date of Birth 1965       Chief Complaint   Patient presents with    Follow Up Wound Care Visit     RIGHT FOOT WOUND       Allergies:  Morphine, Prednisone, and Trazodone    Diagnosis:   Diagnosis ICD-10-CM Associated Orders   1. Non-healing surgical wound, initial encounter  T81.89XA lidocaine (LMX) 4 % cream     Wound cleansing and dressings Surgical Anterior;Right Ankle     Wound Procedure Treatment Surgical Anterior;Right Ankle      2. Bilateral edema of lower extremity  R60.0       3. Tobacco abuse  Z72.0            Assessment and Plan :  Follow up evaluation of nonhealing surgical wound on Right ankle s/p ORIF slowly improving.    Debrided as below  Continue wound management to Exufiber Ag to wound bed and Calmoseptine to wound edges, see wound orders below.  No harsh cleansers such as alcohol, peroxide, or antibacterial soap, do not submerge in water such as bathtub, hot tub, swimming pool, ocean, etc.   Can cleanse with mild soap and water or NSS at dressing changes.   Continue frequent elevation of leg and increase exercise/walking for wound healing.  Counseled on smoking cessation.  Continue adequate protein intake (chicken, fish, yogurt, eggs and nuts), 3-4 servings per day.   Follow up with vascular studies.  Follow-up with vascular for evaluation and possible intervention for venous disease due to tobacco abuse and non-healing surgical wound.  Follow-up in 2 week(s) or call sooner with questions or concerns or any signs of infection such as redness, swelling, increased/purulent drainage, fever, chills, increased severe pain.     Subjective:   12/18: Patient is a 59 y.o. female with pmhx HTN, HLD, Tobacco Abuse, chronic back pain, h/o Tonsillar cancer, Obesity, Continuous opioid dependence, h/o RLE DVT, Heart murmur, Bilateral lower extremity edema and insomnia who presents for initial evaluation of nonhealing surgical wound on R medial ankle which  has been present since 6/21/21 s/p ORIF of bimalleolar ankle. Since then pt was treated with a 7 day course of Keflex for positive wound cultures for 1+ Growth of Candida parapsilosis and 1+ Growth of Staphylococcus coagulase negative. Pt has been applying Nystatin cream on the wound bed and leaving it open to air. Does not have an odor. No diabetes.  No ETOH or drug use. Pt denies any sob, fatigue, N/V, CP, fevers or chills.    Pt is accompanied by her daughter who is actively involved in her care.    12/23: Patient presents for followup evaluation of nonhealing surgical wound on Right ankle s/p ORIF accompanied by her daughter.  Pt states dressing has been adhering to wound bed. No increased pain or drainage.  Has been using Silver alginate on the wound bed with and Calmoseptine to periwound. Patient is using her compression pumps twice daily.  Pt denies any fevers or chills.    12/30: Patient presents for followup evaluation of nonhealing surgical wound on Right ankle s/p ORIF accompanied by her daughter. No issues. Has been using Exufiber Ag on the wound bed with and Calmoseptine to periwound. Pt denies any fevers or chills.          The following portions of the patient's history were reviewed and updated as appropriate:   Patient Active Problem List   Diagnosis    Smoking    Back pain, chronic    Dyslipidemia, goal LDL below 130    Tonsillar cancer (HCC)    HTN, goal below 140/90    Obesity, morbid (HCC)    Continuous opioid dependence (HCC)    Heart murmur, systolic    Wound of right ankle    Smoking greater than 30 pack years    Bilateral lower extremity edema    Encounter for screening mammogram for malignant neoplasm of breast    Primary insomnia     Past Medical History:   Diagnosis Date    Acute encephalopathy 06/26/2021    Bimalleolar fracture of right ankle 06/11/2021    Cancer (HCC)     Chronic back pain     HTN (hypertension) 06/21/2021    Hypertension     Migraine     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    Non  Hodgkin's lymphoma (HCC)     Right leg DVT (HCC) 09/19/2013     Past Surgical History:   Procedure Laterality Date    HYSTERECTOMY      FL OPEN TREATMENT BIMALLEOLAR ANKLE FRACTURE Right 6/21/2021    Procedure: OPEN REDUCTION W/ INTERNAL FIXATION (ORIF) RIGHT ANKLE BIMALLEOLAR FRACTURE;  Surgeon: Joe Bill MD;  Location: [facility];  Service: Orthopedics    TONSILLECTOMY       Family History   Problem Relation Age of Onset    Lung cancer Mother     Skin cancer Father     Cancer Maternal Grandmother         gastro    Breast cancer Neg Hx     Ovarian cancer Neg Hx      Social History     Socioeconomic History    Marital status: /Civil Union     Spouse name: None    Number of children: None    Years of education: None    Highest education level: None   Occupational History    None   Tobacco Use    Smoking status: Every Day     Current packs/day: 0.50     Types: Cigarettes    Smokeless tobacco: Never   Vaping Use    Vaping status: Never Used   Substance and Sexual Activity    Alcohol use: Not Currently    Drug use: Never    Sexual activity: None   Other Topics Concern    None   Social History Narrative    None     Social Drivers of Health     Financial Resource Strain: Not on file   Food Insecurity: Patient Declined (11/8/2024)    Hunger Vital Sign     Worried About Running Out of Food in the Last Year: Patient declined     Ran Out of Food in the Last Year: Patient declined   Transportation Needs: No Transportation Needs (11/8/2024)    PRAPARE - Transportation     Lack of Transportation (Medical): No     Lack of Transportation (Non-Medical): No   Physical Activity: Not on file   Stress: Not on file   Social Connections: Unknown (6/18/2024)    Received from [facility]    Social Connections     How often do you feel lonely or isolated from those around you? (Adult - for ages 18 years and over): Not on file   Intimate Partner Violence: Not on file   Housing Stability: Low Risk  (11/8/2024)    Housing  Stability Vital Sign     Unable to Pay for Housing in the Last Year: No     Number of Times Moved in the Last Year: 1     Homeless in the Last Year: No       Current Outpatient Medications:     aspirin (ECOTRIN LOW STRENGTH) 81 mg EC tablet, Take 162 mg by mouth daily, Disp: , Rfl:     buprenorphine-naloxone (SUBOXONE) 8-2 mg per SL tablet, place 1 tablet under the tongue and ALLOW TO dissolve once daily, Disp: , Rfl:     furosemide (LASIX) 20 mg tablet, take 1 tablet by mouth once daily, Disp: 90 tablet, Rfl: 5    nystatin (MYCOSTATIN) cream, Apply topically 2 (two) times a day for 10 days, Disp: 30 g, Rfl: 0    potassium chloride (Klor-Con M20) 20 mEq tablet, take 1 tablet by mouth once daily, Disp: 90 tablet, Rfl: 3    QUEtiapine (SEROquel) 25 mg tablet, take 1 tablet by mouth at bedtime, Disp: 90 tablet, Rfl: 2  No current facility-administered medications for this visit.    Review of Systems   Constitutional:  Negative for appetite change, chills, fatigue, fever and unexpected weight change.   HENT:  Negative for congestion, hearing loss and postnasal drip.    Respiratory:  Negative for cough and shortness of breath.    Cardiovascular:  Positive for leg swelling.   Skin:  Positive for wound (R medial ankle). Negative for rash.   Neurological:  Negative for numbness.   Hematological:  Does not bruise/bleed easily.   Psychiatric/Behavioral: Negative.           Objective:  /63   Pulse 77   Temp (!) 97.3 °F (36.3 °C)   Resp 18   LMP  (LMP Unknown)   Pain Score: 0-No pain     Physical Exam  Vitals reviewed.   Constitutional:       General: She is not in acute distress.     Appearance: Normal appearance. She is well-developed. She is obese.   HENT:      Head: Normocephalic and atraumatic.   Cardiovascular:      Rate and Rhythm: Normal rate.      Pulses:           Dorsalis pedis pulses are 2+ on the right side.        Posterior tibial pulses are 2+ on the right side.   Pulmonary:      Effort: Pulmonary  "effort is normal.   Musculoskeletal:         General: No deformity.      Right lower leg: Edema present.      Left lower leg: Edema present.        Feet:    Feet:      Comments: Yellow adherent slough with exudate and yellow/serosanguinous drainage, see wound assessment.  Skin:     General: Skin is warm and dry.      Findings: Wound (R medial ankle) present.   Neurological:      General: No focal deficit present.      Mental Status: She is alert and oriented to person, place, and time.   Psychiatric:         Mood and Affect: Mood and affect normal.         Behavior: Behavior normal. Behavior is cooperative.         Wound 12/18/24 Surgical Ankle Anterior;Right (Active)   Wound Image Images linked 12/30/24 0813   Wound Description Slough;Yellow;Pink 12/30/24 0800   Vernell-wound Assessment Erythema;Edema;Fragile;Scar Tissue 12/30/24 0800   Wound Length (cm) 2.7 cm 12/30/24 0800   Wound Width (cm) 1.3 cm 12/30/24 0800   Wound Depth (cm) 0.1 cm 12/30/24 0800   Wound Surface Area (cm^2) 3.51 cm^2 12/30/24 0800   Wound Volume (cm^3) 0.351 cm^3 12/30/24 0800   Calculated Wound Volume (cm^3) 0.35 cm^3 12/30/24 0800   Change in Wound Size % 27.08 12/30/24 0800   Drainage Amount Moderate 12/30/24 0800   Drainage Description Serosanguineous;Yellow 12/30/24 0800   Non-staged Wound Description Full thickness 12/30/24 0800   Dressing Status Intact 12/30/24 0800       Debridement    Universal Protocol:  procedure performed by consultantConsent: Verbal consent obtained. Written consent obtained.  Risks and benefits: risks, benefits and alternatives were discussed  Consent given by: patient  Time out: Immediately prior to procedure a \"time out\" was called to verify the correct patient, procedure, equipment, support staff and site/side marked as required.  Patient understanding: patient states understanding of the procedure being performed  Patient identity confirmed: verbally with patient    Debridement Details  Performed by: " "PA  Debridement type: surgical  Level of debridement: subcutaneous tissue  Pain control: lidocaine 4%      Post-debridement measurements  Length (cm): 2.7  Width (cm): 1.3  Depth (cm): 0.2  Percent debrided: 100%  Surface Area (cm^2): 3.51  Area Debrided (cm^2): 3.51  Volume (cm^3): 0.7    Tissue and other material debrided: subcutaneous tissue  Devitalized tissue debrided: biofilm, exudate and slough  Instrument(s) utilized: curette  Bleeding: small  Hemostasis obtained with: pressure  Procedural pain (0-10): 0  Post-procedural pain: 0   Response to treatment: procedure was tolerated well             Results from last 6 Months   Lab Units 11/08/24  2655   WOUND CULTURE  1+ Growth of Candida parapsilosis*  1+ Growth of Staphylococcus coagulase negative*         Wound Instructions:  Orders Placed This Encounter   Procedures    Wound cleansing and dressings Surgical Anterior;Right Ankle     RIGHT ANKLE WOUND     Wash your hands with soap and water.  Remove old dressing, discard into plastic bag and place in trash.  Cleanse the wound with Normal Saline prior to applying a clean dressing. Do not use tissue or cotton balls. Do not scrub the wound. Pat dry using gauze.     Shower: No. This wound cannot get wet. You may shower if you are able to cover with a CAST COVER.      Apply Calmoseptine to skin surrounding wound.  Apply Exufiber AG to the open wound.    Cover with Gauze and ABD.  Secure with Dahiana and Tape.      Change dressing EVERY DAY.      Elevate your legs above heart level as much as possible.     Standing Status:   Future     Expiration Date:   1/6/2025    Wound Procedure Treatment Surgical Anterior;Right Ankle     This order was created via procedure documentation    Debridement     This order was created via procedure documentation       Carole Ruiz PA-C, [facility]      Portions of the record may have been created with voice recognition software. Occasional wrong word or \"sound alike\" substitutions may have " occurred due to the inherent limitations of voice recognition software. Read the chart carefully and recognize, using context, where substitutions have occurred.

## 2024-12-30 NOTE — PATIENT INSTRUCTIONS
Orders Placed This Encounter   Procedures    Wound cleansing and dressings Surgical Anterior;Right Ankle     RIGHT ANKLE WOUND     Wash your hands with soap and water.  Remove old dressing, discard into plastic bag and place in trash.  Cleanse the wound with Normal Saline prior to applying a clean dressing. Do not use tissue or cotton balls. Do not scrub the wound. Pat dry using gauze.     Shower: No. This wound cannot get wet. You may shower if you are able to cover with a CAST COVER.      Apply Calmoseptine to skin surrounding wound.  Apply Exufiber AG to the open wound.    Cover with Gauze and ABD.  Secure with Dahiana and Tape.      Change dressing EVERY DAY.      Elevate your legs above heart level as much as possible.     Standing Status:   Future     Expiration Date:   1/6/2025

## 2024-12-30 NOTE — PROGRESS NOTES
Wound Procedure Treatment Surgical Anterior;Right Ankle    Performed by: Leann Orozco RN  Authorized by: Carole Ruiz PA-C    Associated wounds:   Wound 12/18/24 Surgical Ankle Anterior;Right  Wound cleansed with:  NSS  Applied primary dressing:  Gelling fiber and Silver  Applied secondary dressing:  Gauze  Dressing secured with:  Dahiana and Tape

## 2024-12-31 ENCOUNTER — HOSPITAL ENCOUNTER (OUTPATIENT)
Dept: CT IMAGING | Facility: HOSPITAL | Age: 59
Discharge: HOME | End: 2024-12-31

## 2024-12-31 ENCOUNTER — HOSPITAL ENCOUNTER (OUTPATIENT)
Dept: NON INVASIVE DIAGNOSTICS | Facility: HOSPITAL | Age: 59
Discharge: HOME | End: 2024-12-31

## 2024-12-31 DIAGNOSIS — F17.210 SMOKING GREATER THAN 30 PACK YEARS: ICD-10-CM

## 2025-01-03 ENCOUNTER — TELEPHONE (OUTPATIENT)
Dept: WOUND CARE | Facility: CLINIC | Age: 60
End: 2025-01-03

## 2025-01-03 NOTE — TELEPHONE ENCOUNTER
Called patient back to reschedule she wasn't available.  Leave a detail message on her voicemail to call us back.

## 2025-02-05 ENCOUNTER — HOSPITAL ENCOUNTER (OUTPATIENT)
Dept: MAMMOGRAPHY | Facility: CLINIC | Age: 60
Discharge: HOME | End: 2025-02-05
Payer: COMMERCIAL

## 2025-02-05 ENCOUNTER — HOSPITAL ENCOUNTER (OUTPATIENT)
Dept: ULTRASOUND IMAGING | Facility: CLINIC | Age: 60
Discharge: HOME | End: 2025-02-05
Payer: COMMERCIAL

## 2025-02-05 VITALS — HEIGHT: 65 IN | BODY MASS INDEX: 36.15 KG/M2 | WEIGHT: 217 LBS

## 2025-02-05 DIAGNOSIS — R92.8 ABNORMAL MAMMOGRAM: ICD-10-CM

## 2025-02-05 PROCEDURE — 76642 ULTRASOUND BREAST LIMITED: CPT

## 2025-02-05 PROCEDURE — 77066 DX MAMMO INCL CAD BI: CPT

## 2025-02-05 RX ADMIN — IOHEXOL 100 ML: 350 INJECTION, SOLUTION INTRAVENOUS at 08:19

## 2025-02-05 NOTE — PROGRESS NOTES
IV removed, catheter intact, small dressing applied, no bleeding noted  Pt given bottle of water, reviewed increasing hydration due to contrast with patient

## 2025-02-06 NOTE — PROGRESS NOTES
Met with patient and Dr. Kashif Escobar regarding recommendation for;    __X___ RIGHT ______LEFT      __X___Ultrasound guided  ______Stereotactic breast biopsy x4.      __X___Verbalized understanding.    Reviewed clip placement with patient, pt states understanding: Yes: __X__ No: ____  Comments:    Blood thinners:  No: _____ Yes: ___X___ What: ASA 81mg       Biopsy teaching sheet given:  Yes: ___X___ No: ________    Pt given contact information and adv to call with any questions/needs    Patient advised to arrive at 0800 for a 0830 appointment

## 2025-02-12 ENCOUNTER — OFFICE VISIT (OUTPATIENT)
Dept: WOUND CARE | Facility: CLINIC | Age: 60
End: 2025-02-12
Payer: COMMERCIAL

## 2025-02-12 VITALS
HEART RATE: 77 BPM | DIASTOLIC BLOOD PRESSURE: 72 MMHG | SYSTOLIC BLOOD PRESSURE: 138 MMHG | TEMPERATURE: 97.8 F | RESPIRATION RATE: 18 BRPM | BODY MASS INDEX: 34.06 KG/M2 | WEIGHT: 217 LBS | HEIGHT: 67 IN

## 2025-02-12 DIAGNOSIS — T81.89XA NON-HEALING SURGICAL WOUND, INITIAL ENCOUNTER: Primary | ICD-10-CM

## 2025-02-12 PROCEDURE — 97597 DBRDMT OPN WND 1ST 20 CM/<: CPT | Performed by: ORTHOPAEDIC SURGERY

## 2025-02-12 PROCEDURE — 99214 OFFICE O/P EST MOD 30 MIN: CPT | Performed by: ORTHOPAEDIC SURGERY

## 2025-02-12 RX ORDER — LIDOCAINE 40 MG/G
CREAM TOPICAL ONCE
Status: COMPLETED | OUTPATIENT
Start: 2025-02-12 | End: 2025-02-12

## 2025-02-12 RX ADMIN — LIDOCAINE: 40 CREAM TOPICAL at 09:13

## 2025-02-12 NOTE — PROGRESS NOTES
Wound Procedure Treatment Surgical Anterior;Right Ankle    Performed by: Megan Reilly RN  Authorized by: Carole Ruiz PA-C    Associated wounds:   Wound 12/18/24 Surgical Ankle Anterior;Right  Wound cleansed with:  Wound aggrssively cleansed with NSS and gauze  Applied primary dressing:  Gelling fiber and Silver  Applied secondary dressing:  Gauze  Dressing secured with:  Dahiana, Tape, Elastic tubular stocking and Size F

## 2025-02-12 NOTE — PATIENT INSTRUCTIONS
Orders Placed This Encounter   Procedures    Wound cleansing and dressings Surgical Anterior;Right Ankle     Wound location Right medial ankle   Change dressing 3 times a week  You may remove the dressing and shower. Do not leave wound open to air, apply new dressing immediately.  Cleanse the wound with wound cleanser or mild soap and water, rinse, pat dry.  Apply Exufiber AG  to the wound. ( Rest sent home with patient. )  Cover with gauze  Secure with rolled gauze and tape          Elastic Tubular Stocking size F    Tubular elastic bandage: Apply from base of toes to behind the knee. Apply in AM, may remove for sleep.    Avoid prolonged standing in one place.    Elevate leg(s) above the level of the heart when sitting or as much as possible.    Please try to stop smoking as that is important for wound healing.     Standing Status:   Future     Expiration Date:   2/19/2025

## 2025-02-12 NOTE — PROGRESS NOTES
Patient ID: Emma Brock is a 59 y.o. female Date of Birth 1965       Chief Complaint   Patient presents with    New Patient Visit     Right ankle wound       Allergies:  Morphine, Prednisone, and Trazodone    Diagnosis:   Diagnosis ICD-10-CM Associated Orders   1. Non-healing surgical wound, initial encounter  T81.89XA Wound cleansing and dressings Surgical Anterior;Right Ankle     Wound Procedure Treatment Surgical Anterior;Right Ankle     lidocaine (LMX) 4 % cream     Debridement           Assessment and Plan :  Follow up evaluation of nonhealing surgical wound on Right ankle s/p ORIF slowly improving.    Debrided as below  Wound management to Exufiber Ag to wound bed, see wound orders below.  Compression with compression stockings, applied Spandagrip in office today.  No harsh cleansers such as alcohol, peroxide, or antibacterial soap, do not submerge in water such as bathtub, hot tub, swimming pool, ocean, etc.   Can cleanse with mild soap and water or NSS at dressing changes.   Continue frequent elevation of leg and increase exercise/walking for wound healing.  Counseled on smoking cessation.  Continue adequate protein intake (chicken, fish, yogurt, eggs and nuts), 3-4 servings per day.   Follow up with vascular studies.  Follow-up with vascular for evaluation and possible intervention for venous disease due to tobacco abuse and non-healing surgical wound.  Follow-up in 1 week(s) or call sooner with questions or concerns or any signs of infection such as redness, swelling, increased/purulent drainage, fever, chills, increased severe pain.     Subjective:   2/12: Patient is a 59 y.o. female with pmhx HTN, HLD, Tobacco Abuse (0.5 ppd), chronic back pain, h/o Tonsillar cancer, Obesity, Continuous opioid dependence, h/o RLE DVT, Heart murmur, Bilateral lower extremity edema and insomnia who return to [facility] for follow up evaluation of nonhealing surgical wound on R medial ankle which has been present since  6/21/21 s/p ORIF of bimalleolar ankle. Pt was last seen at clinic on 12/30 and was unable to continue follow ups due to lack of insurance. She has been applying silver alginate to wound bed.  No diabetes.  No ETOH or drug use. Pt denies any sob, fatigue, N/V, CP, fevers or chills.      The following portions of the patient's history were reviewed and updated as appropriate:   Patient Active Problem List   Diagnosis    Smoking    Back pain, chronic    Dyslipidemia, goal LDL below 130    Tonsillar cancer (HCC)    HTN, goal below 140/90    Obesity, morbid (HCC)    Continuous opioid dependence (HCC)    Heart murmur, systolic    Wound of right ankle    Smoking greater than 30 pack years    Bilateral lower extremity edema    Encounter for screening mammogram for malignant neoplasm of breast    Primary insomnia     Past Medical History:   Diagnosis Date    Acute encephalopathy 06/26/2021    Bimalleolar fracture of right ankle 06/11/2021    Cancer (HCC)     Chronic back pain     HTN (hypertension) 06/21/2021    Hypertension     Migraine     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    Non Hodgkin's lymphoma (HCC)     Right leg DVT (HCC) 09/19/2013     Past Surgical History:   Procedure Laterality Date    BREAST CYST EXCISION Right     40 years ago    HYSTERECTOMY      [place] OPEN TREATMENT BIMALLEOLAR ANKLE FRACTURE Right 06/21/2021    Procedure: OPEN REDUCTION W/ INTERNAL FIXATION (ORIF) RIGHT ANKLE BIMALLEOLAR FRACTURE;  Surgeon: Joe Bill MD;  Location: [facility] OR;  Service: Orthopedics    TONSILLECTOMY       Family History   Problem Relation Age of Onset    Lung cancer Mother     Skin cancer Father     Cancer Maternal Grandmother         gastro    Breast cancer Neg Hx     Ovarian cancer Neg Hx      Social History     Socioeconomic History    Marital status: /Civil Union     Spouse name: Not on file    Number of children: Not on file    Years of education: Not on file    Highest education level: Not on file   Occupational History     Not on file   Tobacco Use    Smoking status: Every Day     Current packs/day: 0.50     Types: Cigarettes    Smokeless tobacco: Never   Vaping Use    Vaping status: Never Used   Substance and Sexual Activity    Alcohol use: Not Currently    Drug use: Never    Sexual activity: Not on file   Other Topics Concern    Not on file   Social History Narrative    Not on file     Social Drivers of Health     Financial Resource Strain: Not on file   Food Insecurity: Patient Declined (11/8/2024)    Hunger Vital Sign     Worried About Running Out of Food in the Last Year: Patient declined     Ran Out of Food in the Last Year: Patient declined   Transportation Needs: No Transportation Needs (11/8/2024)    PRAPARE - Transportation     Lack of Transportation (Medical): No     Lack of Transportation (Non-Medical): No   Physical Activity: Not on file   Stress: Not on file   Social Connections: Unknown (6/18/2024)    Received from [employer]    Social Connections     How often do you feel lonely or isolated from those around you? (Adult - for ages 18 years and over): Not on file   Intimate Partner Violence: Not on file   Housing Stability: Low Risk  (11/8/2024)    Housing Stability Vital Sign     Unable to Pay for Housing in the Last Year: No     Number of Times Moved in the Last Year: 1     Homeless in the Last Year: No       Current Outpatient Medications:     aspirin (ECOTRIN LOW STRENGTH) 81 mg EC tablet, Take 162 mg by mouth daily, Disp: , Rfl:     buprenorphine-naloxone (SUBOXONE) 8-2 mg per SL tablet, place 1 tablet under the tongue and ALLOW TO dissolve once daily, Disp: , Rfl:     furosemide (LASIX) 20 mg tablet, take 1 tablet by mouth once daily, Disp: 90 tablet, Rfl: 5    nystatin (MYCOSTATIN) cream, Apply topically 2 (two) times a day for 10 days, Disp: 30 g, Rfl: 0    potassium chloride (Klor-Con M20) 20 mEq tablet, take 1 tablet by mouth once daily, Disp: 90 tablet, Rfl: 3    QUEtiapine (SEROquel) 25 mg tablet, take 1  "tablet by mouth at bedtime, Disp: 90 tablet, Rfl: 2  No current facility-administered medications for this visit.    Review of Systems   Constitutional:  Negative for appetite change, chills, fatigue, fever and unexpected weight change.   HENT:  Negative for congestion, hearing loss and postnasal drip.    Respiratory:  Negative for cough and shortness of breath.    Cardiovascular:  Positive for leg swelling.   Skin:  Positive for wound (R medial ankle). Negative for rash.   Neurological:  Negative for numbness.   Hematological:  Does not bruise/bleed easily.   Psychiatric/Behavioral: Negative.           Objective:  /72   Pulse 77   Temp 97.8 °F (36.6 °C)   Resp 18   Ht 5' 7\" (1.702 m)   Wt 98.4 kg (217 lb)   LMP  (LMP Unknown)   BMI 33.99 kg/m²         Physical Exam  Vitals reviewed.   Constitutional:       General: She is not in acute distress.     Appearance: Normal appearance. She is well-developed. She is obese.   HENT:      Head: Normocephalic and atraumatic.   Cardiovascular:      Rate and Rhythm: Normal rate.      Pulses:           Dorsalis pedis pulses are 2+ on the right side.        Posterior tibial pulses are 2+ on the right side.   Pulmonary:      Effort: Pulmonary effort is normal.   Musculoskeletal:         General: No deformity.      Right lower leg: Edema present.      Left lower leg: Edema present.        Feet:    Feet:      Comments: Yellow adherent exudate with serosanguinous drainage, see wound assessment.  Skin:     General: Skin is warm and dry.      Findings: Wound (R medial ankle) present.   Neurological:      General: No focal deficit present.      Mental Status: She is alert and oriented to person, place, and time.   Psychiatric:         Mood and Affect: Mood and affect normal.         Behavior: Behavior normal. Behavior is cooperative.         Wound 12/18/24 Surgical Ankle Anterior;Right (Active)   Wound Image Images linked 02/12/25 9094   Wound Description Yellow;Eschar;Dry " "02/12/25 0902   Vernell-wound Assessment Pink 02/12/25 0902   Wound Length (cm) 2 cm 02/12/25 0902   Wound Width (cm) 0.6 cm 02/12/25 0902   Wound Depth (cm) 0.1 cm 02/12/25 0902   Wound Surface Area (cm^2) 1.2 cm^2 02/12/25 0902   Wound Volume (cm^3) 0.12 cm^3 02/12/25 0902   Calculated Wound Volume (cm^3) 0.12 cm^3 02/12/25 0902   Change in Wound Size % 75 02/12/25 0902   Drainage Amount Moderate 02/12/25 0902   Drainage Description Serosanguineous 02/12/25 0902   Non-staged Wound Description Full thickness 02/12/25 0902   Dressing Status Intact 02/12/25 0902       Debridement    Universal Protocol:  procedure performed by consultantConsent: Verbal consent obtained. Written consent obtained.  Risks and benefits: risks, benefits and alternatives were discussed  Consent given by: patient  Time out: Immediately prior to procedure a \"time out\" was called to verify the correct patient, procedure, equipment, support staff and site/side marked as required.  Patient understanding: patient states understanding of the procedure being performed  Patient identity confirmed: verbally with patient    Debridement Details  Performed by: PA  Debridement type: selective  Pain control: lidocaine 4%      Post-debridement measurements  Length (cm): 2  Width (cm): 0.6  Depth (cm): 0.2  Percent debrided: 100%  Surface Area (cm^2): 1.2  Area Debrided (cm^2): 1.2  Volume (cm^3): 0.24    Devitalized tissue debrided: exudate and eschar  Instrument(s) utilized: curette  Bleeding: small  Hemostasis obtained with: pressure  Procedural pain (0-10): 0  Post-procedural pain: 0   Response to treatment: procedure was tolerated well         Results from last 6 Months   Lab Units 11/08/24  0171   WOUND CULTURE  1+ Growth of Candida parapsilosis*  1+ Growth of Staphylococcus coagulase negative*         Wound Instructions:  Orders Placed This Encounter   Procedures    Wound cleansing and dressings Surgical Anterior;Right Ankle     Wound location Right " "medial ankle   Change dressing 3 times a week  You may remove the dressing and shower. Do not leave wound open to air, apply new dressing immediately.  Cleanse the wound with wound cleanser or mild soap and water, rinse, pat dry.  Apply Exufiber AG  to the wound. ( Rest sent home with patient. )  Cover with gauze  Secure with rolled gauze and tape          Elastic Tubular Stocking size F    Tubular elastic bandage: Apply from base of toes to behind the knee. Apply in AM, may remove for sleep.    Avoid prolonged standing in one place.    Elevate leg(s) above the level of the heart when sitting or as much as possible.    Please try to stop smoking as that is important for wound healing.     Standing Status:   Future     Expiration Date:   2/19/2025    Wound Procedure Treatment Surgical Anterior;Right Ankle     This order was created via procedure documentation    Debridement     This order was created via procedure documentation       Carole Ruiz PA-C, [facility]      Portions of the record may have been created with voice recognition software. Occasional wrong word or \"sound alike\" substitutions may have occurred due to the inherent limitations of voice recognition software. Read the chart carefully and recognize, using context, where substitutions have occurred.      "

## 2025-02-19 ENCOUNTER — OFFICE VISIT (OUTPATIENT)
Dept: WOUND CARE | Facility: CLINIC | Age: 60
End: 2025-02-19
Payer: COMMERCIAL

## 2025-02-19 VITALS
SYSTOLIC BLOOD PRESSURE: 164 MMHG | RESPIRATION RATE: 18 BRPM | DIASTOLIC BLOOD PRESSURE: 84 MMHG | HEART RATE: 88 BPM | TEMPERATURE: 97.6 F

## 2025-02-19 DIAGNOSIS — T81.89XA NON-HEALING SURGICAL WOUND, INITIAL ENCOUNTER: Primary | ICD-10-CM

## 2025-02-19 PROCEDURE — 11042 DBRDMT SUBQ TIS 1ST 20SQCM/<: CPT | Performed by: ORTHOPAEDIC SURGERY

## 2025-02-19 RX ORDER — LIDOCAINE 40 MG/G
CREAM TOPICAL ONCE
Status: COMPLETED | OUTPATIENT
Start: 2025-02-19 | End: 2025-02-19

## 2025-02-19 RX ADMIN — LIDOCAINE: 40 CREAM TOPICAL at 08:41

## 2025-02-19 NOTE — PATIENT INSTRUCTIONS
Orders Placed This Encounter   Procedures    Wound Procedure Treatment Surgical Anterior;Right Ankle     This order was created via procedure documentation    Wound cleansing and dressings Surgical Anterior;Right Ankle     RIGHT ANKLE WOUND      Change dressing 3 times a week.     You may remove the dressing and shower.   Do not leave wound open to air, apply new dressing immediately.    Cleanse the wound with wound cleanser or mild soap and water, rinse, pat dry.    Apply Calmoseptine or Desitin to the surrounding wound.   Apply Puracol AG  to the wound. If the dressing does not dissolve into the wound, you should lightly moisten it with Normal Saline.   Cover with gauze.   Secure with rolled gauze and tape.     Standing Status:   Future     Expiration Date:   2/26/2025

## 2025-02-19 NOTE — PROGRESS NOTES
Patient ID: Emma Brock is a 59 y.o. female Date of Birth 1965       Chief Complaint   Patient presents with    Follow Up Wound Care Visit     RIGHT FOOT WOUND       Allergies:  Morphine, Prednisone, and Trazodone    Diagnosis:   Diagnosis ICD-10-CM Associated Orders   1. Non-healing surgical wound, initial encounter  T81.89XA lidocaine (LMX) 4 % cream     Wound Procedure Treatment Surgical Anterior;Right Ankle     Wound cleansing and dressings Surgical Anterior;Right Ankle           Assessment and Plan :  Follow up evaluation of nonhealing surgical wound on Right ankle s/p ORIF continues to progress.    Debrided as below  Change wound management to Purachol Ag on wound bed, see wound orders below.  Continue compression with compression stockings, applied Spandagrip in office today.  No harsh cleansers such as alcohol, peroxide, or antibacterial soap, do not submerge in water such as bathtub, hot tub, swimming pool, ocean, etc.   Can cleanse with mild soap and water or NSS at dressing changes.   Continue frequent elevation of leg and increase exercise/walking for wound healing.  Counseled on smoking cessation.  Continue adequate protein intake (chicken, fish, yogurt, eggs and nuts), 3-4 servings per day.   Follow up with vascular studies as ordered.  Follow-up with vascularsurgeon for evaluation and possible intervention for venous disease due to tobacco abuse and non-healing surgical wound.  Follow-up in 1 week(s) or call sooner with questions or concerns or any signs of infection such as redness, swelling, increased/purulent drainage, fever, chills, increased severe pain.     Subjective:   2/12: Patient is a 59 y.o. female with pmhx HTN, HLD, Tobacco Abuse (0.5 ppd), chronic back pain, h/o Tonsillar cancer, Obesity, Continuous opioid dependence, h/o RLE DVT, Heart murmur, Bilateral lower extremity edema and insomnia who return to [facility] for follow up evaluation of nonhealing surgical wound on R medial  ankle which has been present since 6/21/21 s/p ORIF of bimalleolar ankle. Pt was last seen at clinic on 12/30 and was unable to continue follow ups due to lack of insurance. She has been applying silver alginate to wound bed.  No diabetes.  No ETOH or drug use. Pt denies any sob, fatigue, N/V, CP, fevers or chills.    2/19: Patient presents for followup evaluation of nonhealing surgical wound on Right ankle s/p ORIF.  No new complaints. No increased pain or drainage.  Has been using Exufiber Ag on the wound bed.  Pt denies any fevers or chills.        The following portions of the patient's history were reviewed and updated as appropriate:   Patient Active Problem List   Diagnosis    Smoking    Back pain, chronic    Dyslipidemia, goal LDL below 130    Tonsillar cancer (HCC)    HTN, goal below 140/90    Obesity, morbid (HCC)    Continuous opioid dependence (HCC)    Heart murmur, systolic    Wound of right ankle    Smoking greater than 30 pack years    Bilateral lower extremity edema    Encounter for screening mammogram for malignant neoplasm of breast    Primary insomnia     Past Medical History:   Diagnosis Date    Acute encephalopathy 06/26/2021    Bimalleolar fracture of right ankle 06/11/2021    Cancer (HCC)     Chronic back pain     HTN (hypertension) 06/21/2021    Hypertension     Migraine     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    Non Hodgkin's lymphoma (HCC)     Right leg DVT (HCC) 09/19/2013     Past Surgical History:   Procedure Laterality Date    BREAST CYST EXCISION Right     40 years ago    HYSTERECTOMY      [place] OPEN TREATMENT BIMALLEOLAR ANKLE FRACTURE Right 06/21/2021    Procedure: OPEN REDUCTION W/ INTERNAL FIXATION (ORIF) RIGHT ANKLE BIMALLEOLAR FRACTURE;  Surgeon: Joe Bill MD;  Location: [facility] OR;  Service: Orthopedics    TONSILLECTOMY       Family History   Problem Relation Age of Onset    Lung cancer Mother     Skin cancer Father     Cancer Maternal Grandmother         gastro    Breast cancer Neg Hx      Ovarian cancer Neg Hx      Social History     Socioeconomic History    Marital status: /Civil Union     Spouse name: None    Number of children: None    Years of education: None    Highest education level: None   Occupational History    None   Tobacco Use    Smoking status: Every Day     Current packs/day: 0.50     Types: Cigarettes    Smokeless tobacco: Never   Vaping Use    Vaping status: Never Used   Substance and Sexual Activity    Alcohol use: Not Currently    Drug use: Never    Sexual activity: None   Other Topics Concern    None   Social History Narrative    None     Social Drivers of Health     Financial Resource Strain: Not on file   Food Insecurity: Patient Declined (11/8/2024)    Hunger Vital Sign     Worried About Running Out of Food in the Last Year: Patient declined     Ran Out of Food in the Last Year: Patient declined   Transportation Needs: No Transportation Needs (11/8/2024)    PRAPARE - Transportation     Lack of Transportation (Medical): No     Lack of Transportation (Non-Medical): No   Physical Activity: Not on file   Stress: Not on file   Social Connections: Unknown (6/18/2024)    Received from [facility]    Social Connections     How often do you feel lonely or isolated from those around you? (Adult - for ages 18 years and over): Not on file   Intimate Partner Violence: Not on file   Housing Stability: Low Risk  (11/8/2024)    Housing Stability Vital Sign     Unable to Pay for Housing in the Last Year: No     Number of Times Moved in the Last Year: 1     Homeless in the Last Year: No       Current Outpatient Medications:     aspirin (ECOTRIN LOW STRENGTH) 81 mg EC tablet, Take 162 mg by mouth daily, Disp: , Rfl:     buprenorphine-naloxone (SUBOXONE) 8-2 mg per SL tablet, place 1 tablet under the tongue and ALLOW TO dissolve once daily, Disp: , Rfl:     furosemide (LASIX) 20 mg tablet, take 1 tablet by mouth once daily, Disp: 90 tablet, Rfl: 5    nystatin (MYCOSTATIN) cream, Apply  topically 2 (two) times a day for 10 days, Disp: 30 g, Rfl: 0    potassium chloride (Klor-Con M20) 20 mEq tablet, take 1 tablet by mouth once daily, Disp: 90 tablet, Rfl: 3    QUEtiapine (SEROquel) 25 mg tablet, take 1 tablet by mouth at bedtime, Disp: 90 tablet, Rfl: 2  No current facility-administered medications for this visit.    Review of Systems   Constitutional:  Negative for appetite change, chills, fatigue, fever and unexpected weight change.   HENT:  Negative for congestion, hearing loss and postnasal drip.    Respiratory:  Negative for cough and shortness of breath.    Cardiovascular:  Positive for leg swelling.   Skin:  Positive for wound (R medial ankle). Negative for rash.   Neurological:  Negative for numbness.   Hematological:  Does not bruise/bleed easily.   Psychiatric/Behavioral: Negative.           Objective:  /84   Pulse 88   Temp 97.6 °F (36.4 °C)   Resp 18   LMP  (LMP Unknown)   Pain Score: 0-No pain     Physical Exam  Vitals reviewed.   Constitutional:       General: She is not in acute distress.     Appearance: Normal appearance. She is well-developed. She is obese.   HENT:      Head: Normocephalic and atraumatic.   Cardiovascular:      Rate and Rhythm: Normal rate.      Pulses:           Dorsalis pedis pulses are 2+ on the right side.        Posterior tibial pulses are 2+ on the right side.   Pulmonary:      Effort: Pulmonary effort is normal.   Musculoskeletal:         General: No deformity.      Right lower leg: Edema present.      Left lower leg: Edema present.        Feet:    Feet:      Comments: Yellow adherent exudate with serosanguinous drainage, see wound assessment.  Skin:     General: Skin is warm and dry.      Findings: Wound (R medial ankle) present.   Neurological:      General: No focal deficit present.      Mental Status: She is alert and oriented to person, place, and time.   Psychiatric:         Mood and Affect: Mood and affect normal.         Behavior: Behavior  "normal. Behavior is cooperative.         Wound 12/18/24 Surgical Ankle Anterior;Right (Active)   Wound Image Images linked 02/19/25 0846   Wound Description Pink;Yellow 02/19/25 0838   Vernell-wound Assessment Erythema;Scar Tissue 02/19/25 0838   Wound Length (cm) 1.8 cm 02/19/25 0838   Wound Width (cm) 0.5 cm 02/19/25 0838   Wound Depth (cm) 0.1 cm 02/19/25 0838   Wound Surface Area (cm^2) 0.9 cm^2 02/19/25 0838   Wound Volume (cm^3) 0.09 cm^3 02/19/25 0838   Calculated Wound Volume (cm^3) 0.09 cm^3 02/19/25 0838   Change in Wound Size % 81.25 02/19/25 0838   Drainage Amount Scant 02/19/25 0838   Drainage Description Serosanguineous 02/19/25 0838   Non-staged Wound Description Full thickness 02/19/25 0838   Dressing Status Intact 02/19/25 0838     Debridement   Wound 12/18/24 Surgical Ankle Anterior;Right    Universal Protocol:  procedure performed by consultantConsent: Verbal consent obtained. Written consent obtained.  Risks and benefits: risks, benefits and alternatives were discussed  Consent given by: patient  Time out: Immediately prior to procedure a \"time out\" was called to verify the correct patient, procedure, equipment, support staff and site/side marked as required.  Patient understanding: patient states understanding of the procedure being performed  Patient identity confirmed: verbally with patient    Debridement Details  Performed by: PA  Debridement type: surgical  Level of debridement: subcutaneous tissue  Pain control: lidocaine 4%      Post-debridement measurements  Length (cm): 1.8  Width (cm): 0.5  Depth (cm): 0.2  Percent debrided: 100%  Surface Area (cm^2): 0.9  Area Debrided (cm^2): 0.9  Volume (cm^3): 0.18    Tissue and other material debrided: subcutaneous tissue  Devitalized tissue debrided: biofilm, exudate and slough  Instrument(s) utilized: curette  Bleeding: small  Hemostasis obtained with: pressure  Procedural pain (0-10): 0  Post-procedural pain: 0   Response to treatment: procedure " "was tolerated well         Results from last 6 Months   Lab Units 11/08/24  4208   WOUND CULTURE  1+ Growth of Candida parapsilosis*  1+ Growth of Staphylococcus coagulase negative*       Wound Instructions:  Orders Placed This Encounter   Procedures    Wound Procedure Treatment Surgical Anterior;Right Ankle     This order was created via procedure documentation    Wound cleansing and dressings Surgical Anterior;Right Ankle     RIGHT ANKLE WOUND      Change dressing 3 times a week.     You may remove the dressing and shower.   Do not leave wound open to air, apply new dressing immediately.    Cleanse the wound with wound cleanser or mild soap and water, rinse, pat dry.    Apply Calmoseptine or Desitin to the surrounding wound.   Apply Puracol AG  to the wound. If the dressing does not dissolve into the wound, you should lightly moisten it with Normal Saline.   Cover with gauze.   Secure with rolled gauze and tape.     Standing Status:   Future     Expiration Date:   2/26/2025       Carole Ruiz PA-C, [facility]      Portions of the record may have been created with voice recognition software. Occasional wrong word or \"sound alike\" substitutions may have occurred due to the inherent limitations of voice recognition software. Read the chart carefully and recognize, using context, where substitutions have occurred.    "

## 2025-02-19 NOTE — PROGRESS NOTES
Wound Procedure Treatment Surgical Anterior;Right Ankle    Performed by: Leann Orozco RN  Authorized by: Carole Ruiz PA-C  Associated wounds:   Wound 12/18/24 Surgical Ankle Anterior;Right    Wound cleansed with:  NSS   Applied to periwound:  Calmoseptine   Applied primary dressing:  Collagen dressing and Silver   Applied secondary dressing:  Gauze   Dressing secured with:  Dahiana and Tape

## 2025-02-21 ENCOUNTER — TELEPHONE (OUTPATIENT)
Dept: VASCULAR SURGERY | Facility: CLINIC | Age: 60
End: 2025-02-21

## 2025-02-21 NOTE — TELEPHONE ENCOUNTER
Called pt and spoke with her. Informed her we had to move her appointment time from 1:00pm on 03/24/25 to 4:00pm . She was okay with that and confirmed the new time change.

## 2025-02-22 ENCOUNTER — HOSPITAL ENCOUNTER (OUTPATIENT)
Dept: MAMMOGRAPHY | Facility: CLINIC | Age: 60
Discharge: HOME | End: 2025-02-22
Payer: COMMERCIAL

## 2025-02-22 ENCOUNTER — HOSPITAL ENCOUNTER (OUTPATIENT)
Dept: ULTRASOUND IMAGING | Facility: CLINIC | Age: 60
Discharge: HOME | End: 2025-02-22
Payer: COMMERCIAL

## 2025-02-22 VITALS — DIASTOLIC BLOOD PRESSURE: 96 MMHG | HEART RATE: 84 BPM | SYSTOLIC BLOOD PRESSURE: 188 MMHG

## 2025-02-22 DIAGNOSIS — R92.8 ABNORMAL MAMMOGRAM: ICD-10-CM

## 2025-02-22 PROCEDURE — 88342 IMHCHEM/IMCYTCHM 1ST ANTB: CPT | Performed by: PATHOLOGY

## 2025-02-22 PROCEDURE — 88360 TUMOR IMMUNOHISTOCHEM/MANUAL: CPT | Performed by: PATHOLOGY

## 2025-02-22 PROCEDURE — 88305 TISSUE EXAM BY PATHOLOGIST: CPT | Performed by: PATHOLOGY

## 2025-02-22 PROCEDURE — A4648 IMPLANTABLE TISSUE MARKER: HCPCS

## 2025-02-22 PROCEDURE — 19084 BX BREAST ADD LESION US IMAG: CPT

## 2025-02-22 PROCEDURE — 88341 IMHCHEM/IMCYTCHM EA ADD ANTB: CPT | Performed by: PATHOLOGY

## 2025-02-22 PROCEDURE — 19083 BX BREAST 1ST LESION US IMAG: CPT

## 2025-02-22 RX ORDER — LIDOCAINE HYDROCHLORIDE 10 MG/ML
5 INJECTION, SOLUTION EPIDURAL; INFILTRATION; INTRACAUDAL; PERINEURAL ONCE
Status: COMPLETED | OUTPATIENT
Start: 2025-02-22 | End: 2025-02-22

## 2025-02-22 RX ADMIN — LIDOCAINE HYDROCHLORIDE 5 ML: 10 INJECTION, SOLUTION EPIDURAL; INFILTRATION; INTRACAUDAL; PERINEURAL at 08:48

## 2025-02-22 RX ADMIN — LIDOCAINE HYDROCHLORIDE 5 ML: 10 INJECTION, SOLUTION EPIDURAL; INFILTRATION; INTRACAUDAL; PERINEURAL at 08:57

## 2025-02-22 RX ADMIN — LIDOCAINE HYDROCHLORIDE 5 ML: 10 INJECTION, SOLUTION EPIDURAL; INFILTRATION; INTRACAUDAL; PERINEURAL at 08:53

## 2025-02-22 RX ADMIN — LIDOCAINE HYDROCHLORIDE 5 ML: 10 INJECTION, SOLUTION EPIDURAL; INFILTRATION; INTRACAUDAL; PERINEURAL at 09:01

## 2025-02-22 NOTE — PROGRESS NOTES
Ice pack given:    ___x__yes _____no      Discharge instructions reviewed & given to patient:    __x___yes _____no      Biopsy site clean and dry with no bleeding on discharge:    ___x__yes ____no      Discharged via:    __x___ambulatory    _____wheelchair    _____stretche  Patient would like results over the phone.  Ok to leave a message.

## 2025-02-22 NOTE — PROGRESS NOTES
Procedure type:    __x___ultrasound guided _____stereotactic _____ MRI guided    Breast:    _____Left ___x__Right    Location: 9 o'clock 8 cmfn    Needle: 14 gauge hair     # of passes: 4    Clip: Hydromark butterfly     Performed by: Dr. Kashif Escobar     Pressure held for 5 minutes by: Emma Olivo Strips:    __x___yes _____no    Band aid:    __x___yes_____no    Tape and guaze:    _____yes ___x__no    Tolerated procedure:    ___x__yes _____no

## 2025-02-22 NOTE — PROGRESS NOTES
Procedure type:    __x___ultrasound guided _____stereotactic _____ MRI guided    Breast:    _____Left ___x__Right    Location: 10 o'clock  5 cmfn     Needle: 14 gauge hair     # of passes: 4    Clip: Securmark cylinder     Performed by: Dr. Kashif Escobar     Pressure held for 5 minutes by: Emma Hanley     Sterpapito Strips:    __x___yes _____no    Band aid:    __x___yes_____no    Tape and guaze:    _____yes ___x__no    Tolerated procedure:    ___x__yes _____no

## 2025-02-22 NOTE — PROGRESS NOTES
Procedure type:    __x___ultrasound guided _____stereotactic _____ MRI guided    Breast:    _____Left __x___Right    Location: 10 o'clock 3 cmfn     Needle: 14 gauge hair     # of passes: 4    Clip: Miguel chase     Performed by: Dr. Kashif Escobar     Pressure held for 5 minutes by: Emma Olivo Strips:    ___x__yes _____no    Band aid:    __x___yes_____no    Tape and guaze:    _____yes __x___no    Tolerated procedure:    __x___yes _____no

## 2025-02-22 NOTE — PROGRESS NOTES
Procedure type:    ___x__ultrasound guided _____stereotactic _____ MRI guided    Breast:    _____Left ___x__Right    Location: 10 o'clock  6 cmfn     Needle: 14 gaugee hair     # of passes: 4    Clip: MELVA      Performed by: Dr. Kashif Escobar     Pressure held for 5 minutes by: Emma Hanley     Sterpapito Strips:    __x___yes _____no    Band aid:    ___x__yes_____no    Tape and guaze:    _____yes __x___no    Tolerated procedure:    __x___yes _____no

## 2025-02-24 NOTE — PROGRESS NOTES
Post procedure call completed    Bleeding: _____yes __X___no (pt denies)    Pain: _____yes ___X___no (pt with soreness, used ice, took Iibuprofen with relief)    Redness/Swelling: ______yes ___X___no (pt with slight bruising)    Band aid removed: ___X__yes _____no    Steri-Strips intact: ___X___yes _____no (discussed with patient to remove steri strips on Thursday if they have not come off on their own)    Pt with no questions at this time, adv will call when results available, adv to call with any questions or concerns, has name/# for contact

## 2025-02-26 ENCOUNTER — TELEPHONE (OUTPATIENT)
Dept: MAMMOGRAPHY | Facility: CLINIC | Age: 60
End: 2025-02-26

## 2025-02-26 ENCOUNTER — TELEPHONE (OUTPATIENT)
Age: 60
End: 2025-02-26

## 2025-02-26 ENCOUNTER — DOCUMENTATION (OUTPATIENT)
Dept: HEMATOLOGY ONCOLOGY | Facility: CLINIC | Age: 60
End: 2025-02-26

## 2025-02-26 DIAGNOSIS — C50.911 INFILTRATING DUCTAL CARCINOMA OF RIGHT FEMALE BREAST (HCC): Primary | ICD-10-CM

## 2025-02-26 DIAGNOSIS — N60.91 ATYPICAL DUCTAL HYPERPLASIA OF RIGHT BREAST: ICD-10-CM

## 2025-02-26 PROCEDURE — 88305 TISSUE EXAM BY PATHOLOGIST: CPT | Performed by: PATHOLOGY

## 2025-02-26 PROCEDURE — 88341 IMHCHEM/IMCYTCHM EA ADD ANTB: CPT | Performed by: PATHOLOGY

## 2025-02-26 PROCEDURE — 88360 TUMOR IMMUNOHISTOCHEM/MANUAL: CPT | Performed by: PATHOLOGY

## 2025-02-26 PROCEDURE — 88342 IMHCHEM/IMCYTCHM 1ST ANTB: CPT | Performed by: PATHOLOGY

## 2025-02-26 NOTE — TELEPHONE ENCOUNTER
[facility] - Newly Diagnosed Breast Cancer     ALL Newly Diagnosed Breast Cancer    Script for Genetics:    It is standard of care for individuals with breast cancer to undergo genetic testing.  Certain genetic test results may impact your breast surgeon's recommendations, so it is beneficial to initiate that testing before the first surgical appointment. We placed a referral to the genetics team. They will call you to discuss further.       Notes to Genetics Team (not required):               Route to 'Oncology Genetics Breast Pool' in [facility]

## 2025-02-26 NOTE — PROGRESS NOTES
All records needed are in patients chart. No records retrieval needed at this time.    Pt referred to surgical oncology and should be scheduled within 7 days.  Please notify me if not scheduled within time frame.    Scheduled with Dr Rivera on 3/3/25    Referral received/ Chart reviewed for work up completed     [facility]   [] PET/CT   [] MRI   [] CT   [x] US   [x] Mammo   [] Bone scan   [] N/A    Pathology completed:    Date: 25   Location: [facility]   []N/A    Additional records needed:    [] Genomic report   [] Genetic testing results   [] Office Note   [] Procedure/ Operative note   [] Lab results   [x] N/A      [] Radiation Oncology records retrieval needed (PN to route to rad/onc clerical pool once scheduled) n/a  Date:  Location:    Chart reviewed for prepping for initial outreach by nurse navigator.    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  Non Hodgkin's diagnosed at the same time?  There are records in Care Everywhere    Does patient have a MELVA ? yes in malignancy    Is patient diabetic?  YES/NO: no    Is patient on a blood thinner?  no    Surgical Oncology: Nicole-3/3/25 consult    Medical Oncology: TBD    Radiation Oncology: TBD  Previous radiation treated noted?  None known    Genetic testing: ordered    Family history of cancer: Mother had lung cancer, MGM had stomach cancer, maternal aunt had breast cancer, father had skin cancer    Any further needs: unknown if patient had treatment for possible Hodgkin's lymphoma.      Information forwarded to Outbound  PEP      Nurse Navigator will call patient for initial outreach.      Will have Patient Navigator outreach patient after surgical oncology consultation.  Any clinical questions to be directed to ONN or office nurse.

## 2025-02-26 NOTE — TELEPHONE ENCOUNTER
Call placed to patient after pt given biopsy results from radiologist, Dr. Kashif Escobar, questions answered, support given, adv next step is to set patient up with surgeon, options discussed and pt would like to have appt made with Dr. Enmanuel Rivera, adv patient that  would call her back by tomorrow with appt, pt states understanding, pt with no questions at this time, has name/# for any further needs    Pt also adv that she would be getting call from Nurse Navigator Ela for additional support and continued care    Also discussed with patient about genetic testing, pt has not had this done and is interested in completing, adv that someone from genetic counseling will reach out to her to discuss, pt states understanding    Pt also needs additional MELVA, set up appt for 3/28 at 1330 with arrival at 1300, reviewed pre procedure instructions

## 2025-02-27 ENCOUNTER — TELEPHONE (OUTPATIENT)
Dept: GENETICS | Facility: CLINIC | Age: 60
End: 2025-02-27

## 2025-02-27 ENCOUNTER — DOCUMENTATION (OUTPATIENT)
Dept: HEMATOLOGY ONCOLOGY | Facility: CLINIC | Age: 60
End: 2025-02-27

## 2025-02-27 DIAGNOSIS — Z80.3 FAMILY HISTORY OF BREAST CANCER: ICD-10-CM

## 2025-02-27 DIAGNOSIS — N60.91 ATYPICAL DUCTAL HYPERPLASIA OF RIGHT BREAST: ICD-10-CM

## 2025-02-27 DIAGNOSIS — C50.911 INVASIVE DUCTAL CARCINOMA OF BREAST, RIGHT (HCC): Primary | ICD-10-CM

## 2025-02-27 DIAGNOSIS — Z80.0 FAMILY HISTORY OF STOMACH CANCER: ICD-10-CM

## 2025-02-27 PROBLEM — Z17.0 MALIGNANT NEOPLASM OF UPPER-OUTER QUADRANT OF RIGHT BREAST IN FEMALE, ESTROGEN RECEPTOR POSITIVE (HCC): Status: ACTIVE | Noted: 2025-02-27

## 2025-02-27 PROBLEM — C50.411 MALIGNANT NEOPLASM OF UPPER-OUTER QUADRANT OF RIGHT BREAST IN FEMALE, ESTROGEN RECEPTOR POSITIVE (HCC): Status: ACTIVE | Noted: 2025-02-27

## 2025-02-27 NOTE — TELEPHONE ENCOUNTER
I introduced myself to Emma and let her know that her nurse navigator reached out to the cancer risk and genetics program on her behalf to begin STAT genetic testing process.    I reviewed the following with Emma:    While the majority of cancer occurs by chance, approximately 5-10% of breast cancer has an underlying genetic cause. Genetic testing is available which can determine if there is an underlying genetic cause to your cancer. Understanding if there is an underlying genetic cause can:  Provide your surgeon with additional information to help with surgical decisions (i.e. lumpectomy vs mastectomy), treatment decisions and eligibility for clinical trials.    It can determine if you have an increased risk for any additional cancers.  Help family members understand their cancer risk.       We work closely with the [facility] breast surgeons and are reaching out to see if you have interest in genetic testing. This test is not a requirement but can take 5-10 days to complete so we would like to start the process as soon as possible so the results are ready for your appointment with your surgeon.       If interested, family history will be collected to initiate STAT genetic testing process.        Patient elected to pursue testing     Diagnosis Details:  Invasive Ductal Carcinoma  Right  Hormone receptors pending    Personal History:  Do you have a personal history of any other cancer? Yes  If yes type/age of diagnosis: Tonsillar cancer age 56; Non Hodgkin's Lymphoma age 56    Family history:   Do you have Ashkenazi Pentec[employer]al ancestry? No  If yes, maternal, paternal, or both?    Do you have any children? Yes  How many sons? 0  How many daughters? 2  Do any of your children have a history of cancer? No  If yes type/age of diagnosis:     Do you have any brothers or sisters? Yes  How many brothers? 3  How many sisters? 1  Are they from the same parents? Yes  If no how maternal/paternal half-siblings:  Do any of  your brothers or sisters have a history of cancer? No  If yes who and the type/age of diagnosis:           Do you have nieces or nephews? Yes  Do any of them have a history of cancer? No  If yes type/age of diagnosis:    Does your mother have a history of cancer? Yes  If yes, cancer type and age of diagnosis: Lung Cancer age 60  Is your mother still living? No  Age/Age of death: 65    Thinking about your mother's family (aunts, uncles, cousins, grandparents) is there anyone with a history of cancer? Yes  If yes, list relationship, cancer type and age of diagnosis:  MGM - Stomach Cancer age 70s ()  Maternal Aunt - Breast Cancer 50s ()    Does your father have a history of cancer? Yes  If yes, cancer type and age of diagnosis: Skin Cancer (Arm)  Is your father still living? No  Age/age of death: 64    Thinking about your father's family (aunts, uncles, cousins, grandparents) is there anyone with a history of cancer? No  If yes, list relationship, cancer type and age of diagnosis:      Types of Results  Positive Result - May explain personal diagnosis/family history. Can give surgeon information on treatment plan, inform future screening/management or tell a person about other possible risks. Positive results can initiate testing for other family members who may be at risk (children, siblings, etc)  Negative Result - Does not give an explanation. Surgical/treatment plan will be based on clinical presentation and will be part of discussion with surgeon. Negative result cannot be passed down to children, but they are still at elevated risk.  Uncertain Result - Common, but treated like a negative result clinically. 90% are downgraded over time.     [facility] billing policy   Most individuals pay <$100 for hereditary cancer genetic testing. If insurance covers the cost of the testing, individuals may still pay out of pocket secondary to co-pays, co-insurance, or deductibles. If the cost of the  testing exceeds $100, the lab will reach out to the patient via phone or e-mail. The patient will then have the option to proceed with the testing, cancel the testing, or elect the self-pay option of $250. Emma verbalized understanding.    We have genetic counselors available, if you have any additional questions or would like to speak with them we can schedule you a 15 minute appointment.      Plan:  An order was placed and the patient was instructed to go to a [facility] lab for a blood collection    Genetic Testing Preformed: [facility] BRCAplus STAT Panel (13 genes): BRADLEY, BARD1, BRCA1, BRCA2, CDH1, CHEK2, NF1, PALB2, PTEN, RAD51C, RAD51D, STK11, TP53 with reflex to [facility] [employer]: Cancer+RNAinsight (59 genes): APC, BRADLEY, AXIN2, BAP1, BARD1, BMPR1A, BRCA1, BRCA2, BRIP1, CDH1, CDK4, CDKN1B, CDKN2A, CHEK2, CTNNA1, DICER1, EGLN1, EPCAM, FH, FLCN, GREM1, HOXB13, KIF1B, KIT, MAX, MEN1, MET, MITF, MLH1, MSH2, MSH3, MSH6, MUTYH, NF1, NTHL1, PALB2, PDGFRA PMS2, POLD1, POLE, POT1, PTEN, RAD51C, RAD51D, RB1, RET, SDHA, SDHAF2, SDHB, SDHC, SDHD, SMAD4, SMARCA4, STK11, QBYF065, TP53, TSC1, TSC2, VHL     Initial results will take approximately 5-12 days to return     Additional results may take up to 2-3 weeks to complete once test is started    Result Call Information:    Patient agreeable to phone call only if results are positive/complex    In the event that we need to reach Emma via telephone:  I confirmed the patient's mobile number on file as the best number to call with results  I confirmed with the patient that we can leave a voicemail on the provided numbers    Patient does not have any further questions, declined meeting with genetic counselor    When your results are ready, your results will be available in your my chart. If you would like, someone from the genetics team will call you with any type of result- positive, negative or uncertain. Otherwise our team will only call to review significant or complex  result. We will make your care team aware of your result.

## 2025-02-27 NOTE — PROGRESS NOTES
In basket message received from pathology.  Patient had recent breast biopsy.  Results came back positive for breast cancer.  Reflex testing to [employer] to be ordered by pathology based upon established criteria.          Information needed for [employer] Test Requisition Form:      Kinza Status:  Was there a lymph node biopsied?  no  [x] NX-not submitted or found  [] N0-negative  [] N1-1-3 nodes  []N2 or N3-greater than or equal to 4 nodes     Was ultrasound/imaging of axilla performed? yes       Tumor Information:  Tumor Size: (based upon breast ultrasound-largest tumor, if more than one)  [x] Less than or equal to 5 cm  [] Greater than or equal to 5 cm      Ordering Physician: Dr. Luis Rivera  Phone #: [phone]  NPI #: [id number]  Fax #: [phone]      Information sent to pathologist as well as referring physician and staff.

## 2025-02-28 NOTE — PROGRESS NOTES
Call placed to patient regarding recommendation for;    __X___ RIGHT ______LEFT      __X___SAVI  placement.    Procedure explained to patient, additional questions answered at this time    __X___Verbalized understanding.      Blood thinners:  _____yes __X___no    Date stopped: ___N/A____    All teaching points discussed during call, pt with no questions at this time, pt adv to arrive at 1300 for 1330 insertion    Pt given name/# for any further questions/needs

## 2025-03-03 ENCOUNTER — OFFICE VISIT (OUTPATIENT)
Dept: SURGICAL ONCOLOGY | Facility: CLINIC | Age: 60
End: 2025-03-03
Payer: COMMERCIAL

## 2025-03-03 ENCOUNTER — OFFICE VISIT (OUTPATIENT)
Dept: WOUND CARE | Facility: CLINIC | Age: 60
End: 2025-03-03
Payer: COMMERCIAL

## 2025-03-03 ENCOUNTER — HOSPITAL ENCOUNTER (OUTPATIENT)
Dept: RADIOLOGY | Facility: MEDICAL CENTER | Age: 60
Discharge: HOME | End: 2025-03-03
Payer: COMMERCIAL

## 2025-03-03 ENCOUNTER — APPOINTMENT (OUTPATIENT)
Dept: LAB | Facility: HOSPITAL | Age: 60
End: 2025-03-03
Payer: COMMERCIAL

## 2025-03-03 VITALS
SYSTOLIC BLOOD PRESSURE: 187 MMHG | HEART RATE: 80 BPM | RESPIRATION RATE: 18 BRPM | DIASTOLIC BLOOD PRESSURE: 83 MMHG | TEMPERATURE: 98.1 F

## 2025-03-03 VITALS
SYSTOLIC BLOOD PRESSURE: 130 MMHG | BODY MASS INDEX: 34.53 KG/M2 | OXYGEN SATURATION: 94 % | TEMPERATURE: 98 F | DIASTOLIC BLOOD PRESSURE: 78 MMHG | HEART RATE: 87 BPM | HEIGHT: 67 IN | WEIGHT: 220 LBS

## 2025-03-03 DIAGNOSIS — T81.89XA NON-HEALING SURGICAL WOUND, INITIAL ENCOUNTER: Primary | ICD-10-CM

## 2025-03-03 DIAGNOSIS — Z17.0 MALIGNANT NEOPLASM OF UPPER-OUTER QUADRANT OF RIGHT BREAST IN FEMALE, ESTROGEN RECEPTOR POSITIVE (HCC): ICD-10-CM

## 2025-03-03 DIAGNOSIS — N60.91 ATYPICAL DUCTAL HYPERPLASIA OF RIGHT BREAST: ICD-10-CM

## 2025-03-03 DIAGNOSIS — C50.411 MALIGNANT NEOPLASM OF UPPER-OUTER QUADRANT OF RIGHT BREAST IN FEMALE, ESTROGEN RECEPTOR POSITIVE (HCC): ICD-10-CM

## 2025-03-03 DIAGNOSIS — C50.911 INFILTRATING DUCTAL CARCINOMA OF RIGHT FEMALE BREAST (HCC): ICD-10-CM

## 2025-03-03 DIAGNOSIS — Z72.0 TOBACCO ABUSE: ICD-10-CM

## 2025-03-03 DIAGNOSIS — C50.411 MALIGNANT NEOPLASM OF UPPER-OUTER QUADRANT OF RIGHT BREAST IN FEMALE, ESTROGEN RECEPTOR POSITIVE (HCC): Primary | ICD-10-CM

## 2025-03-03 DIAGNOSIS — Z17.0 MALIGNANT NEOPLASM OF UPPER-OUTER QUADRANT OF RIGHT BREAST IN FEMALE, ESTROGEN RECEPTOR POSITIVE (HCC): Primary | ICD-10-CM

## 2025-03-03 LAB
BUN SERPL-MCNC: 12 MG/DL (ref 5–25)
CREAT SERPL-MCNC: 0.71 MG/DL (ref 0.6–1.3)
GFR SERPL CREATININE-BSD FRML MDRD: 92 ML/MIN/1.73SQ M

## 2025-03-03 PROCEDURE — 84520 ASSAY OF UREA NITROGEN: CPT

## 2025-03-03 PROCEDURE — 99205 OFFICE O/P NEW HI 60 MIN: CPT | Performed by: SURGERY

## 2025-03-03 PROCEDURE — 82565 ASSAY OF CREATININE: CPT

## 2025-03-03 PROCEDURE — 11042 DBRDMT SUBQ TIS 1ST 20SQCM/<: CPT

## 2025-03-03 PROCEDURE — 74177 CT ABD & PELVIS W/CONTRAST: CPT

## 2025-03-03 PROCEDURE — 36415 COLL VENOUS BLD VENIPUNCTURE: CPT

## 2025-03-03 PROCEDURE — 71260 CT THORAX DX C+: CPT

## 2025-03-03 RX ORDER — LIDOCAINE 40 MG/G
CREAM TOPICAL ONCE
Status: COMPLETED | OUTPATIENT
Start: 2025-03-03 | End: 2025-03-03

## 2025-03-03 RX ADMIN — IOHEXOL 100 ML: 350 INJECTION, SOLUTION INTRAVENOUS at 13:26

## 2025-03-03 RX ADMIN — LIDOCAINE: 40 CREAM TOPICAL at 08:53

## 2025-03-03 NOTE — PROGRESS NOTES
Wound Procedure Treatment Surgical Anterior;Right Ankle    Performed by: Leann Orozco RN  Authorized by: ALEKSANDER Dunn  Associated wounds:   Wound 12/18/24 Surgical Ankle Anterior;Right    Wound cleansed with:  NSS   Applied to periwound:  Calmoseptine   Applied primary dressing:  Collagen dressing and Silver   Applied secondary dressing:  Gauze   Dressing secured with:  Dahiana and Tape

## 2025-03-03 NOTE — PROGRESS NOTES
Name: Emma Brock      : 1965      MRN: [id number]  Encounter Provider: Luis Rivera MD  Encounter Date: 3/3/2025   Encounter department: [facility] [place]  :  Assessment & Plan  Malignant neoplasm of upper-outer quadrant of right breast in female, estrogen receptor positive (HCC)    Orders:    NM bone scan whole body; Future    CT chest abdomen pelvis w contrast; Future    BUN; Future    Creatinine, serum; Future    Infiltrating ductal carcinoma of right female breast (HCC)    Orders:    Ambulatory referral to Surgical Oncology    Atypical ductal hyperplasia of right breast    Orders:    Ambulatory referral to Surgical Oncology    Multifocal right breast invasive ductal carcinoma ER/MI positive HER2 negative tumor.  Will obtain genetic testing  Will obtain staging workup.  Await MammaPrint results.  59-year-old female with recent diagnosis of multi focal right breast invasive ductal carcinoma.  We did discuss in detail breast cancer initiation promotion progression and further workup and management.  She has family history of breast cancer.  She is not a smoker.  She also has a nonhealing right leg wound      Survivorship  Discussed symptoms related to disease recurrence, Yes     Evaluated for late effects related to cancer treatment, Yes     Screening current for cervical cancer, No     Screening current for colon cancer, Yes     Cancer rehabilitation services addressed, No     Screening current for osteoporosis, No     Oncology Treatment Summary reviewed with patient and copy provided, Yes     Referral placed for psychosocial evaluation/screening to oncology social work  Yes    History of Present Illness   Emma Brock is a 59 y.o. year old female who presents with newly diagnosed right breast multifocal breast cancer.  She has family history of breast cancer her aunt.  She is a smoker.  She is retired.  She initially went for screening  mammogram this was read as abnormal followed by stereotactic biopsy at 10:00 9:00.  Final pathology is consistent with invasive ductal carcinoma.  She denies of any breast pain nipple discharge nipple retraction or skin changes.  She is here with his daughter to discuss further management.     Oncology History   Cancer Staging   No matching staging information was found for the patient.  Oncology History   Malignant neoplasm of upper-outer quadrant of right breast in female, estrogen receptor positive (HCC)   2/22/2025 Initial Diagnosis    Malignant neoplasm of upper-outer quadrant of right breast in female, estrogen receptor positive (HCC)     2/22/2025 Biopsy    RIGHT breast ultrasound-guided  A.0900 o'clock, 6 cmfn cm from nipple (butterfly)  Adenosis and sclerosing adenosis    B. 1000 3 cmfn (hat)  Atypical Ductal Hyperplasia    C.1000 5cmfn (cylinder)  Adenosis and sclerosing adenosis,    D. 1000 6 cmfn (MELVA)  Invasive Ductal Hyperplasia with DCIS  Grade 2   WA 60 HER2 0    Concordant. Multifocal. Malignancy measures up to 8mm on US. ADH measures 4mm on US. Axillary US clear. Contralateral breast clear.  IDC and ADH span approximately 4.5 cm.         Review of Systems   Constitutional:  Negative for chills and fever.   HENT:  Negative for ear pain and sore throat.    Eyes:  Negative for pain and visual disturbance.   Respiratory:  Negative for cough and shortness of breath.    Cardiovascular:  Negative for chest pain and palpitations.   Gastrointestinal:  Negative for abdominal pain and vomiting.   Genitourinary:  Negative for dysuria and hematuria.   Musculoskeletal:  Negative for arthralgias and back pain.   Skin:  Negative for color change and rash.   Neurological:  Negative for seizures and syncope.   All other systems reviewed and are negative.   A complete review of systems is negative other than that noted above in the HPI.    Medical History Reviewed by provider this encounter:     .    "    Objective   /78 (BP Location: Right arm, Patient Position: Sitting)   Pulse 87   Temp 98 °F (36.7 °C) (Temporal)   Ht 5' 7\" (1.702 m)   Wt 99.8 kg (220 lb)   LMP  (LMP Unknown)   SpO2 94%   BMI 34.46 kg/m²     Pain Screening:     ECOG    Physical Exam  Constitutional:       Appearance: Normal appearance.   HENT:      Head: Normocephalic and atraumatic.      Nose: Nose normal.      Mouth/Throat:      Mouth: Mucous membranes are moist.   Eyes:      Pupils: Pupils are equal, round, and reactive to light.   Cardiovascular:      Rate and Rhythm: Normal rate.      Pulses: Normal pulses.      Heart sounds: Normal heart sounds.   Pulmonary:      Effort: Pulmonary effort is normal.      Breath sounds: Normal breath sounds.   Chest:          Comments: Bilateral breast examination right breast upper outer quadrant is tender.  No other palpable mass mass or nipple discharge nipple retraction or unexpected skin changes mild bruise in the skin from the recent biopsy.  Bilateral axillary and supraclavicular examination no palpable adenopathy.  Patient was examined seated as well as supine position  Abdominal:      General: Bowel sounds are normal.      Palpations: Abdomen is soft.   Musculoskeletal:         General: Normal range of motion.      Cervical back: Normal range of motion and neck supple.   Skin:     General: Skin is warm.   Neurological:      General: No focal deficit present.      Mental Status: She is alert and oriented to person, place, and time.   Psychiatric:         Mood and Affect: Mood normal.         Behavior: Behavior normal.         Thought Content: Thought content normal.         Judgment: Judgment normal.          Labs: I have reviewed pertinent labs.   Lab Results   Component Value Date/Time    WBC 8.86 12/13/2024 09:11 AM    RBC 4.56 12/13/2024 09:11 AM    Hemoglobin 14.4 12/13/2024 09:11 AM    Hematocrit 44.0 12/13/2024 09:11 AM    MCV 97 12/13/2024 09:11 AM    MCH 31.6 12/13/2024 09:11 " AM    RDW 12.0 12/13/2024 09:11 AM    Platelets 252 12/13/2024 09:11 AM    Segmented % 66 12/13/2024 09:11 AM    Lymphocytes % 23 12/13/2024 09:11 AM    Monocytes % 8 12/13/2024 09:11 AM    Eosinophils Relative 1 12/13/2024 09:11 AM    Basophils Relative 1 12/13/2024 09:11 AM    Immature Grans % 1 12/13/2024 09:11 AM    Absolute Neutrophils 6.02 12/13/2024 09:11 AM      Lab Results   Component Value Date/Time    Sodium 140 12/13/2024 09:11 AM    Potassium 4.0 12/13/2024 09:11 AM    Chloride 102 12/13/2024 09:11 AM    CO2 28 12/13/2024 09:11 AM    ANION GAP 10 12/13/2024 09:11 AM    BUN 18 12/13/2024 09:11 AM    Creatinine 0.72 12/13/2024 09:11 AM    Glucose, Fasting 120 (H) 12/13/2024 09:11 AM    Calcium 9.6 12/13/2024 09:11 AM    AST 14 12/13/2024 09:11 AM    ALT 10 12/13/2024 09:11 AM    Alkaline Phosphatase 54 12/13/2024 09:11 AM    Total Protein 7.5 12/13/2024 09:11 AM    Albumin 4.3 12/13/2024 09:11 AM    Total Bilirubin 0.41 12/13/2024 09:11 AM    eGFR 90 12/13/2024 09:11 AM      Hospital Outpatient Visit on 02/22/2025   Component Date Value Ref Range Status    Case Report 02/22/2025    Final                    Value:Surgical Pathology Report                         Case: [id number]                                  Authorizing Provider:  ALEKSANDER Anaya Collected:           02/22/2025 [id number]              Ordering Location:     [facility] Received:            02/22/2025 [address] [place] [address]                                                              Pathologist:           Domenic Cotton MD                                                             Specimens:   A) - Breast, Right, US right brst bx 900 6cmfn, 4 passes, 14g marquee                               B) - Breast, Right, US right brst bx 1000 3cmfn, 4 passes, 14g marquee                              C) - Breast, Right, US right brst bx 1000 5cmfn, 4 passes, 14g marquee                               D) - Breast, Right, US right brst bx 1000 6cmfn, 4 passes, 14g marquee                     Addendum 02/22/2025    Final                    Value:Specimen is being sent to [facility] for Mammaprint + Blueprint testing. Results will follow separately.       Final Diagnosis 02/22/2025    Final                    Value:A. Breast, Right, US right brst bx 900 6cmfn, 4 passes, 14g marquee:  - Adenosis and sclerosing adenosis. See note.    NOTE A: Immunostains with antibodies for smooth muscle myosin heavy chain, p63 and E-cadherin confirm the diagnosis of adenosis and sclerosing adenosis.    B. Breast, Right, US right brst bx 1000 3cmfn, 4 passes, 14g marquee:  - Atypical ductal hyperplasia (ADH) in the area of adenosis and sclerosing adenosis with microcalcifications. See note    NOTE B: Immunostains with antibodies for smooth muscle myosin heavy chain and p63 confirm the diagnosis of adenosis and sclerosing adenosis (blocks B1, B2 and B3).    Immunostains with antibodies for CK5/6 and ER were performed on blocks B1 and B2.  CK5/6 send the ER stains support the diagnosis of ADH    C. Breast, Right, US right brst bx 1000 5cmfn, 4 passes, 14g marquee:  - Adenosis and sclerosing adenosis.  See note.    NOTE C: Immunostains with antibodies for smooth muscle myosin heavy chain and p63 confirm the diagnosis                           of adenosis and sclerosing adenosis (block C1)    D. Breast, Right, US right brst bx 1000 6cmfn, 4 passes, 14g marquee:  - Invasive ductal carcinoma, moderately differentiated, involving 4 of 4 cores, with focal microcalcifications. Invasive carcinoma measures about 7 mm (0.7 cm) in the needle biopsy material (slide D1). See note  - Ductal carcinoma in situ seen, solid type, intermediate nuclear grade  - Receptor studies will be performed. ADDENDUM TO FOLLOW  - Please see CAP checklist.       NOTE D:  Immunostains with antibodies for smooth muscle myosin heavy chain, p63 and E-cadherin confirm the  diagnosis of invasive ductal carcinoma (slide D1).         Note 02/22/2025    Final                    Value:Intradepartmental consultation was obtained.    Findings were communicated to [facility] breast nurse navigators via Secure Chat by Dr. Cotton on 2/26/2025.    BEST TUMOR BLOCK(S) FOR POTENTIAL FUTURE STUDIES: D1 and D2               Additional Information 02/22/2025    Final                    Value:All reported additional testing was performed with appropriately reactive controls.  These tests were developed and their performance characteristics determined by [facility] or appropriate performing facility, though some tests may be performed on tissues which have not been validated for performance characteristics (such as staining performed on alcohol exposed cell blocks and decalcified tissues).  Results should be interpreted with caution and in the context of the patients’ clinical condition. These tests may not be cleared or approved by the U.S. Food and Drug Administration, though the FDA has determined that such clearance or approval is not necessary. These tests are used for clinical purposes and they should not be regarded as investigational or for research. This laboratory has been approved by CLIA 88, designated as a high-complexity laboratory and is qualified to perform these tests.    Interpretation performed at [facility], [address] [place] [id number]     .      Synoptic Checklist 02/22/2025    Corrected                    Value:INVASIVE CARCINOMA OF THE BREAST: Biopsy                            INVASIVE CARCINOMA OF THE BREAST: BIOPSY - D                            Protocol posted: 3/22/2023                                                        SPECIMEN                               Procedure:    Needle biopsy                                Specimen Laterality:    Right                                                         TUMOR                                Tumor Site:    Clock position                                :    10 o'clock                              Tumor Site:    Distance from nipple (Centimeters): 6 cm                             Histologic Type:    Invasive carcinoma of no special type (ductal)                              Histologic Grade ([place] Histologic Score):                                   Glandular (Acinar) / Tubular Differentiation:    Score 3                                Nuclear Pleomorphism:    Score 2                                Mitotic Rate:    Score 1                                Overall Grade:    Grade 2 (scores of 6 or 7)                              Largest Invasive Focus in this Limited Biopsy Sample:    7 mm                             Ductal Carcinoma In Situ (DCIS):    Present                                Architectural Patterns:    Solid                                Nuclear Grade:    Grade II (intermediate)                                Necrosis:    Not identified                              Lymphatic and / or Vascular Invasion:    Not identified                              Microcalcifications:    Present in invasive carcinoma                                                          Breast Biomarker Studies (pending):    ER, TN, HER2                             Breast Biomarker Reporting Template                            (Added in Addendum) BREAST BIOMARKER REPORTING TEMPLATE - D                            Protocol posted: 12/13/2023                                                           Test(s) Performed:                                     Estrogen Receptor (ER) Status:    Positive (greater than 10% of cells demonstrate nuclear positivity)                                    Percentage of Cells with Nuclear Positivity:    99 %                                   Average Intensity of Staining:    Strong                                  Test Type:    Food and Drug Administration (FDA) cleared  "(test / vendor): [facility] anti-Estrogen Receptor (ER)/[place]                                  Primary Antibody:    SP1                                Test(s) Performed:                                     Progesterone Receptor (PgR) Status:    Positive                                    Percentage of Cells with Nuclear Positivity:    60 %                                   Average Intensity of Staining:    Strong                                  Test Type:    Food and Drug Administration (FDA) cleared (test / vendor): [facility] anti-Progesterone Receptor (PA)/[place]                                  Primary Antibody:    1E2                                Test(s) Performed:                                     HER2 by Immunohistochemistry:    Negative (Score 0)                                  Test Type:    Food and Drug Administration (FDA) cleared (test / vendor): [facility] anti-HER-2/yareli (4B5)/[place]                                  Primary Antibody:    4B5                                Cold Ischemia and Fixation Times:    Meet requirements specified in latest version of the ASCO / CAP Guidelines                                Testing Performed on Block Number(s):    D1       Gross Description 02/22/2025    Final                    Value:A. The specimen is received in formalin, labeled with the patient's name and hospital number, and is designated \" US right brst bx 900 6cmfn, 4 passes, 14G marquee.\"  It consists of 4 delicate cores of white-tan soft tissue, measuring 0.5-1.6 cm in length and 0.1-0.2 cm in diameter.  The specimen is submitted in toto in 2 cassettes, between sponges.    The cold ischemia time based upon information provided by the submitting clinician and receiving staff in the laboratory is 23 minutes.    Note: The estimated total formalin fixation time based upon information provided by the submitting clinician and the standard processing schedule is 49.25 hours.  B. The specimen is " "received in formalin, labeled with the patient's name and hospital number, and is designated \" US right mass brst bx 1000 3cmfn, 4 passes, 14 g marquee.\"  It consists of 5 cores of white soft tissue, measuring 0.6-1.6 cm in length and 0.2 cm in diameter.  The specimen is submitted in toto in 3 cassettes, between                           sponges.  C. The specimen is received in formalin, labeled with the patient's name and hospital number, and is designated \" uterus right brst bx 1000 5cmfn, 4 passes, 14 g marquee.\"  It consists of 4 cores of white soft tissue, measuring 0.5-1.4 cm in length and 0.1-0.2 cm in diameter.  The specimen is submitted in toto in 2 cassettes, between sponges.  D. The specimen is received in formalin, labeled with the patient's name and hospital number, and is designated \" US right brst bx 1000 6cmfn, 4 passes, 14G marquee.\"  It consists of 4 cores of white-tan soft tissue, measuring 1.3-1.6 cm in length and 0.2 cm in diameter.  The specimen is submitted in toto in 2 cassettes, between sponges.    The cold ischemia time for parts B-D, based upon information provided by the submitting clinician and receiving staff in the laboratory is within 1 minute.    Note: The estimated total formalin fixation time for parts B-D, based upon information provided by the submitting clinician and the standard                           processing schedule is 49.5 hours.    [facility]      Clinical Information 02/22/2025    Final                    Value:Please contact Denise Nowak at [facility] with results at [phone]    Per radiology report:    INDICATION: Emma Brock is a 59 y.o. female presenting for abnormal mammogram.     FINDINGS:   RIGHT  1) MASS [A]  Mammo Contrast Enhanced Diag Bilateral: There is an enhancing round mass with indistinct margins seen in the upper outer quadrant of the right breast in the posterior depth. The mass correlates with the prior mammogram finding.   US breast right " limited (diagnostic): There is a 7 mm x 6 mm x 8 mm round, hypoechoic mass with indistinct margins seen in the right breast at 10 o'clock, 6 cm from the nipple. The mass correlates with the prior mammogram finding.  No right axillary adenopathy is identified.  2) MASS [B]  US breast right limited (diagnostic): There is a 4 mm x 2 mm x 5 mm oval, hypoechoic mass with indistinct margins seen in the right breast at 9 o'clock, 6 cm from the nipple.   3) MASS [C]  US breast right limited (diagnostic): There is a 5 mm x 3 mm x 3 mm                           oval, hypoechoic mass with indistinct margins seen in the right breast at 10 o'clock, 5 cm from the nipple.   4) MASS [D]  US breast right limited (diagnostic): There is a 4 mm x 2 mm x 4 mm oval, hypoechoic mass with indistinct margins seen in the right breast at 10 o'clock, 3 cm from the nipple.      LEFT  Mammo Contrast Enhanced Diag Bilateral  There is moderate background parenchymal enhancement.  There are no suspicious masses, grouped microcalcifications or areas of unexplained architectural distortion. The skin and nipple areolar complex are unremarkable.         IMPRESSION:  Suspicious enhancing mass in the 10 o'clock position of the right breast, 6 cm from the nipple.  None circumscribed masses in the 10 o'clock position of the right breast, 3 cm from the nipple; 10 o'clock position right breast, 5 cm from nipple; and 9 o'clock position of the right breast, 6 cm from nipple.  Ultrasound-guided core needle biopsies are recommended.  Reevaluation of background parenchymal                           enhancement in both breasts will be performed after pathology results and post biopsy mammogram.  This may result in additional areas of concern.     The findings, recommendation for biopsies, and reevaluation after biopsy were discussed with the patient at the time of this exam.     ASSESSMENT/BI-RADS CATEGORY:  Left: 2 - Benign  Right: 4 - Suspicious  Overall: 4 -  Suspicious          Pathology: I have reviewed pathology reports described above.    Radiology Results Review: I personally reviewed the following image studies in PACS and associated radiology reports: Mammogram and Ultrasound(s). My interpretation of the radiology images/reports is: Agree with the report.  This is a multifocal breast cancer area of expansion at least 4.5 cm approximately..  Concordance: yes

## 2025-03-03 NOTE — PATIENT INSTRUCTIONS
Orders Placed This Encounter   Procedures    Wound cleansing and dressings Surgical Anterior;Right Ankle     RIGHT ANKLE WOUND     Change dressing 3 times a week.      You may remove the dressing and shower.   Do not leave wound open to air, apply new dressing immediately.     Cleanse the wound with wound cleanser or mild soap and water, rinse, pat dry.     Apply Calmoseptine or Desitin to the surrounding wound.   Apply Puracol AG  to the wound. If the dressing does not dissolve into the wound, you should lightly moisten it with Normal Saline.   Cover with gauze.   Secure with rolled gauze and tape.     Standing Status:   Future     Expiration Date:   3/10/2025

## 2025-03-03 NOTE — PROGRESS NOTES
Patient ID: Emma Brock is a 59 y.o. female Date of Birth 1965       Chief Complaint   Patient presents with    Follow Up Wound Care Visit     Right ankle wound       Allergies:  Morphine, Prednisone, and Trazodone    Diagnosis:      Diagnosis ICD-10-CM Associated Orders   1. Non-healing surgical wound, initial encounter  T81.89XA lidocaine (LMX) 4 % cream     Wound cleansing and dressings Surgical Anterior;Right Ankle     Wound Procedure Treatment Surgical Anterior;Right Ankle      2. Tobacco abuse  Z72.0               Assessment & Plan:  Right medial ankle wound is improved. Will recommend to continue with current wound management with Puracol Ag. Continue with frequent leg elevation for edema management.   See wound orders below  Wound is not showing any clinical s/s of infection  Debrided as below.   Counseled patient on the importance of smoking cessation, and adequate protein intake (3-4 servings per day) to promote wound healing.  Follow-up in 2 week(s). Call sooner with questions or concerns or any signs of infection such as redness, swelling, increased/purulent drainage, fever or chills, and increased pain.  Patient verbalized understanding and agrees with plan of care.          Subjective:   3/3/25: Patient presents to the wound care center for follow-up visit of right medial ankle nonhealing surgical wound.  Patient offers no wound related complaints, denies increased pain or drainage.  Patient states that she feels her wound is improving.  No fever or chills. Patient states was seen by surgical oncology this morning for newly diagnosed breast cancer.        The following portions of the patient's history were reviewed and updated as appropriate:   Patient Active Problem List   Diagnosis    Smoking    Back pain, chronic    Dyslipidemia, goal LDL below 130    Tonsillar cancer (HCC)    HTN, goal below 140/90    Obesity, morbid (HCC)    Continuous opioid dependence (HCC)    Heart murmur, systolic     Wound of right ankle    Smoking greater than 30 pack years    Bilateral lower extremity edema    Encounter for screening mammogram for malignant neoplasm of breast    Primary insomnia    Atypical ductal hyperplasia of right breast    Malignant neoplasm of upper-outer quadrant of right breast in female, estrogen receptor positive (HCC)     Past Medical History:   Diagnosis Date    Acute encephalopathy 06/26/2021    Anxiety 2/12/2025    Bimalleolar fracture of right ankle 06/11/2021    Breast cyst 1980    Cancer (HCC)     Chronic back pain     Fibrocystic breast     HTN (hypertension) 06/21/2021    Hypertension     Migraine     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    Non Hodgkin's lymphoma (HCC)     Obesity     Right leg DVT (HCC) 09/19/2013     Past Surgical History:   Procedure Laterality Date    BREAST BIOPSY  1980    [place], [place]    BREAST CYST EXCISION Right     40 years ago    BREAST LUMPECTOMY  1980    HYSTERECTOMY      LYMPH NODE BIOPSY      WI OPEN TREATMENT BIMALLEOLAR ANKLE FRACTURE Right 06/21/2021    Procedure: OPEN REDUCTION W/ INTERNAL FIXATION (ORIF) RIGHT ANKLE BIMALLEOLAR FRACTURE;  Surgeon: Joe Bill MD;  Location: [facility];  Service: Orthopedics    TONSILLECTOMY      US GUIDANCE BREAST BIOPSY RIGHT EACH ADDITIONAL Right 02/22/2025    US GUIDANCE BREAST BIOPSY RIGHT EACH ADDITIONAL Right 02/22/2025    US GUIDANCE BREAST BIOPSY RIGHT EACH ADDITIONAL Right 02/22/2025    US GUIDED BREAST BIOPSY RIGHT COMPLETE Right 02/22/2025     Family History   Problem Relation Age of Onset    Lung cancer Mother     COPD Mother     Skin cancer Father     Hypertension Father     Alcohol abuse Father     Depression Father     Diabetes Father     Breast cancer Maternal Aunt     Stomach cancer Maternal Grandmother     Cancer Maternal Grandmother     Completed Suicide  Maternal Grandfather     Ovarian cancer Neg Hx       Social History     Socioeconomic History    Marital status: /Civil Union      Spouse name: None    Number of children: None    Years of education: None    Highest education level: None   Occupational History    None   Tobacco Use    Smoking status: Every Day     Current packs/day: 0.50     Average packs/day: 0.5 packs/day for 46.1 years (23.1 ttl pk-yrs)     Types: Cigarettes     Start date: 1/9/1979    Smokeless tobacco: Never   Vaping Use    Vaping status: Never Used   Substance and Sexual Activity    Alcohol use: Not Currently    Drug use: Not Currently    Sexual activity: Not Currently     Partners: Male     Birth control/protection: Female Sterilization   Other Topics Concern    None   Social History Narrative    None     Social Drivers of Health     Financial Resource Strain: Not on file   Food Insecurity: Patient Declined (11/8/2024)    Hunger Vital Sign     Worried About Running Out of Food in the Last Year: Patient declined     Ran Out of Food in the Last Year: Patient declined   Transportation Needs: No Transportation Needs (11/8/2024)    PRAPARE - Transportation     Lack of Transportation (Medical): No     Lack of Transportation (Non-Medical): No   Physical Activity: Not on file   Stress: Not on file   Social Connections: Unknown (6/18/2024)    Received from [facility]    Social CDEL     How often do you feel lonely or isolated from those around you? (Adult - for ages 18 years and over): Not on file   Intimate Partner Violence: Not on file   Housing Stability: Low Risk  (11/8/2024)    Housing Stability Vital Sign     Unable to Pay for Housing in the Last Year: No     Number of Times Moved in the Last Year: 1     Homeless in the Last Year: No        Current Outpatient Medications:     aspirin (ECOTRIN LOW STRENGTH) 81 mg EC tablet, Take 162 mg by mouth daily, Disp: , Rfl:     buprenorphine-naloxone (SUBOXONE) 8-2 mg per SL tablet, place 1 tablet under the tongue and ALLOW TO dissolve once daily, Disp: , Rfl:     furosemide (LASIX) 20 mg tablet, take 1 tablet by mouth once  daily, Disp: 90 tablet, Rfl: 5    nystatin (MYCOSTATIN) cream, Apply topically 2 (two) times a day for 10 days, Disp: 30 g, Rfl: 0    potassium chloride (Klor-Con M20) 20 mEq tablet, take 1 tablet by mouth once daily, Disp: 90 tablet, Rfl: 3    QUEtiapine (SEROquel) 25 mg tablet, take 1 tablet by mouth at bedtime, Disp: 90 tablet, Rfl: 2  No current facility-administered medications for this visit.    Review of Systems   Constitutional:  Negative for chills and fever.   HENT:  Negative for congestion and sneezing.    Respiratory:  Negative for cough and shortness of breath.    Cardiovascular:  Positive for leg swelling.   Musculoskeletal:  Negative for gait problem.   Skin:  Positive for wound.   Psychiatric/Behavioral:  Negative for agitation.        Objective:  BP (!) 187/83   Pulse 80   Temp 98.1 °F (36.7 °C)   Resp 18   LMP  (LMP Unknown)   Pain Score: 0-No pain     Physical Exam  Constitutional:       General: She is awake. She is not in acute distress.     Appearance: She is not ill-appearing, toxic-appearing or diaphoretic.   HENT:      Head: Normocephalic and atraumatic.      Right Ear: External ear normal.      Left Ear: External ear normal.   Eyes:      Conjunctiva/sclera: Conjunctivae normal.   Cardiovascular:      Pulses:           Dorsalis pedis pulses are 2+ on the right side.   Pulmonary:      Effort: Pulmonary effort is normal. No respiratory distress.   Musculoskeletal:      Right lower leg: Edema present.      Comments: trace   Skin:     General: Skin is warm and dry.      Findings: Wound present.      Comments: 1. Right medial ankle wound - wound bed with mixed pink and yellow tissue. No purulent drainage or malodor. Vernell-wound discoloration is stable in appearance when compared to previous wound images. No erythema, warmth or lymphangitic streaking to suggest cellulitis.  Refer to wound assessment for additional details.   Neurological:      Mental Status: She is alert.   Psychiatric:          "Mood and Affect: Mood normal.         Behavior: Behavior normal. Behavior is cooperative.         Wound 12/18/24 Surgical Ankle Anterior;Right (Active)   Wound Image   03/03/25 0858   Wound Description Pink;Yellow 03/03/25 0851   Vernell-wound Assessment Erythema;Scar Tissue 03/03/25 0851   Wound Length (cm) 1.9 cm 03/03/25 0851   Wound Width (cm) 0.4 cm 03/03/25 0851   Wound Depth (cm) 0.1 cm 03/03/25 0851   Wound Surface Area (cm^2) 0.76 cm^2 03/03/25 0851   Wound Volume (cm^3) 0.076 cm^3 03/03/25 0851   Calculated Wound Volume (cm^3) 0.08 cm^3 03/03/25 0851   Change in Wound Size % 83.33 03/03/25 0851   Drainage Amount Moderate 03/03/25 0851   Drainage Description Serosanguineous 03/03/25 0851   Non-staged Wound Description Full thickness 03/03/25 0851   Dressing Status Intact 03/03/25 0851         Debridement   Wound 12/18/24 Surgical Ankle Anterior;Right    Universal Protocol:  Consent: Verbal consent obtained.  Risks and benefits: risks, benefits and alternatives were discussed  Consent given by: patient  Time out: Immediately prior to procedure a \"time out\" was called to verify the correct patient, procedure, equipment, support staff and site/side marked as required.  Timeout called at: 3/3/2025 8:55 AM.  Patient understanding: patient states understanding of the procedure being performed  Patient identity confirmed: verbally with patient    Debridement Details  Performed by: NP  Debridement type: surgical  Level of debridement: subcutaneous tissue  Pain control: lidocaine 4%      Post-debridement measurements  Length (cm): 1.9  Width (cm): 0.4  Depth (cm): 0.2  Percent debrided: 100%  Surface Area (cm^2): 0.76  Area Debrided (cm^2): 0.76  Volume (cm^3): 0.15    Tissue and other material debrided: subcutaneous tissue  Devitalized tissue debrided: biofilm, exudate, fibrin and slough  Instrument(s) utilized: curette  Bleeding: small  Hemostasis obtained with: pressure  Procedural pain (0-10): 0  Post-procedural " "pain: 0   Response to treatment: procedure was tolerated well                   Results from last 6 Months   Lab Units 11/08/24  1543   WOUND CULTURE  1+ Growth of Candida parapsilosis*  1+ Growth of Staphylococcus coagulase negative*       Lab Results   Component Value Date    HGBA1C 6.1 (H) 12/13/2024       Wound Instructions:  Orders Placed This Encounter   Procedures    Wound cleansing and dressings Surgical Anterior;Right Ankle     RIGHT ANKLE WOUND     Change dressing 3 times a week.      You may remove the dressing and shower.   Do not leave wound open to air, apply new dressing immediately.     Cleanse the wound with wound cleanser or mild soap and water, rinse, pat dry.     Apply Calmoseptine or Desitin to the surrounding wound.   Apply Puracol AG  to the wound. If the dressing does not dissolve into the wound, you should lightly moisten it with Normal Saline.   Cover with gauze.   Secure with rolled gauze and tape.     Standing Status:   Future     Expiration Date:   3/10/2025    Wound Procedure Treatment Surgical Anterior;Right Ankle     This order was created via procedure documentation           ALEKSANDER Main, KEN, CANDELARIO    Portions of the record may have been created with voice recognition software. Occasional wrong word or \"sound alike\" substitutions may have occurred due to the inherent limitations of voice recognition software. Read the chart carefully and recognize, using context, where substitutions have occurred.          Total time spent today:    Total time (face-to-face and non-face-to-face) spent on today's visit was 12 minutes. This includes preparation for the visits (i.e. reviewing test results from date recent hospitalizations, ER/Urgent Care/primary care visits and recent consultant office visits), performance of a medically appropriate history and examination, and orders for medications or testing.     "

## 2025-03-03 NOTE — ASSESSMENT & PLAN NOTE
Orders:    NM bone scan whole body; Future    CT chest abdomen pelvis w contrast; Future    BUN; Future    Creatinine, serum; Future

## 2025-03-04 ENCOUNTER — PATIENT OUTREACH (OUTPATIENT)
Dept: HEMATOLOGY ONCOLOGY | Facility: CLINIC | Age: 60
End: 2025-03-04

## 2025-03-04 DIAGNOSIS — Z17.0 MALIGNANT NEOPLASM OF UPPER-OUTER QUADRANT OF RIGHT BREAST IN FEMALE, ESTROGEN RECEPTOR POSITIVE (HCC): Primary | ICD-10-CM

## 2025-03-04 DIAGNOSIS — C50.411 MALIGNANT NEOPLASM OF UPPER-OUTER QUADRANT OF RIGHT BREAST IN FEMALE, ESTROGEN RECEPTOR POSITIVE (HCC): Primary | ICD-10-CM

## 2025-03-04 NOTE — PROGRESS NOTES
I reached out and spoke with Emma, now that consults have been completed with the oncology teams. I introduced myself and explained my role as their Patient Navigator. I reviewed for any barriers to care and offered referrals to supportive services as needed. I reviewed and updated the members assigned to the care team in [facility]. She knows the members of the care team as well as how and when to contact them with any needs.     Distress Thermometer completed at this time. Patient scored 8/10. Referral to  placed..  Pt is afraid of the uknown and not having any control     She is currently able to drive and denies any transportation needs.      She denies any uncontrolled symptoms. Discussed role of Palliative Care in symptom and side effect management. Declined referral at this time.    She states that she is eating and drinking as per usual with no unintentional weight loss.   Completed MST and patient Does not meet criteria for referral to Oncology Dietic[facility] Services    Patient is currently smoking. We reviewed the smoking cessation program. I offered to place a referral. Patient accepted and will await a call from the .      She states she is well supported by family and friends.      She feels she has adequate insurance coverage and denies any financial concerns at this time.     She verbalizes managing the schedules well.   Future Appointments   Date Time Provider Department Center   3/5/2025  8:30 AM [facility] 1 [facility]   3/5/2025 11:00 AM [facility] NM 1 [facility]   3/17/2025  9:30 AM Carole Ruiz PA-C [facility]   3/17/2025  1:20 PM ALEKSANDER [facility]   3/28/2025  1:30 PM [facility] 1 [facility] US [facility]   3/31/2025 10:30 AM Luis Rivera MD [facility]        Based on individual needs I will follow up in about 4-6 weeks. I have provided my direct contact information and welcome them to contact me if needs as discussed above change.  She was appreciative for the call.

## 2025-03-05 ENCOUNTER — PATIENT OUTREACH (OUTPATIENT)
Dept: CASE MANAGEMENT | Facility: HOSPITAL | Age: 60
End: 2025-03-05

## 2025-03-05 ENCOUNTER — HOSPITAL ENCOUNTER (OUTPATIENT)
Dept: NUCLEAR MEDICINE | Facility: HOSPITAL | Age: 60
Discharge: HOME | End: 2025-03-05
Payer: COMMERCIAL

## 2025-03-05 ENCOUNTER — DOCUMENTATION (OUTPATIENT)
Dept: HEMATOLOGY ONCOLOGY | Facility: CLINIC | Age: 60
End: 2025-03-05

## 2025-03-05 DIAGNOSIS — C50.411 MALIGNANT NEOPLASM OF UPPER-OUTER QUADRANT OF RIGHT BREAST IN FEMALE, ESTROGEN RECEPTOR POSITIVE (HCC): ICD-10-CM

## 2025-03-05 DIAGNOSIS — Z17.0 MALIGNANT NEOPLASM OF UPPER-OUTER QUADRANT OF RIGHT BREAST IN FEMALE, ESTROGEN RECEPTOR POSITIVE (HCC): ICD-10-CM

## 2025-03-05 PROCEDURE — A9503 TC99M MEDRONATE: HCPCS

## 2025-03-05 PROCEDURE — 78306 BONE IMAGING WHOLE BODY: CPT

## 2025-03-05 NOTE — PROGRESS NOTES
OncSW referral received, chart review completed.  Call placed to pt today, she answered and when I introduced myself she shared that she had just gotten home from some testing today and would prefer a call back at another time.  We agreed to call back on Friday, she was very appreciative.

## 2025-03-05 NOTE — PROGRESS NOTES
Breast Oncology Nurse Navigator    Called patient for initial outreach from nurse navigator.  I spoke with patient spouse, I gave him my name and contact info for pt to call back..

## 2025-03-07 ENCOUNTER — PATIENT OUTREACH (OUTPATIENT)
Dept: CASE MANAGEMENT | Facility: HOSPITAL | Age: 60
End: 2025-03-07

## 2025-03-12 ENCOUNTER — PATIENT OUTREACH (OUTPATIENT)
Dept: CASE MANAGEMENT | Facility: HOSPITAL | Age: 60
End: 2025-03-12

## 2025-03-13 ENCOUNTER — RESULTS FOLLOW-UP (OUTPATIENT)
Dept: GENETICS | Facility: CLINIC | Age: 60
End: 2025-03-13

## 2025-03-13 ENCOUNTER — TELEPHONE (OUTPATIENT)
Dept: SURGICAL ONCOLOGY | Facility: CLINIC | Age: 60
End: 2025-03-13

## 2025-03-13 NOTE — TELEPHONE ENCOUNTER
Left message with patient to reschedule her appointment on 3/31 with Dr. Rivera because he will be out of the office. Patients appointment can be moved ahead to the week of March 24th if she is agreeable. Left office number for a return call.

## 2025-03-17 ENCOUNTER — TELEPHONE (OUTPATIENT)
Age: 60
End: 2025-03-17

## 2025-03-17 ENCOUNTER — OFFICE VISIT (OUTPATIENT)
Dept: WOUND CARE | Facility: CLINIC | Age: 60
End: 2025-03-17
Payer: COMMERCIAL

## 2025-03-17 VITALS
HEART RATE: 86 BPM | TEMPERATURE: 97.7 F | SYSTOLIC BLOOD PRESSURE: 170 MMHG | RESPIRATION RATE: 20 BRPM | DIASTOLIC BLOOD PRESSURE: 77 MMHG

## 2025-03-17 DIAGNOSIS — T81.89XA NON-HEALING SURGICAL WOUND, INITIAL ENCOUNTER: Primary | ICD-10-CM

## 2025-03-17 PROCEDURE — 99213 OFFICE O/P EST LOW 20 MIN: CPT | Performed by: ORTHOPAEDIC SURGERY

## 2025-03-17 RX ORDER — LIDOCAINE 40 MG/G
CREAM TOPICAL ONCE
Status: COMPLETED | OUTPATIENT
Start: 2025-03-17 | End: 2025-03-17

## 2025-03-17 RX ADMIN — LIDOCAINE: 40 CREAM TOPICAL at 09:35

## 2025-03-17 NOTE — TELEPHONE ENCOUNTER
Pt called to reschedule her appointment for this afternoon. Pt rescheduled for Monday, 3/31/25 at 11 am  
Unknown

## 2025-03-17 NOTE — PROGRESS NOTES
Patient ID: Emma Brock is a 59 y.o. female Date of Birth 1965       Chief Complaint   Patient presents with    Follow Up Wound Care Visit     RIGHT ANKLE WOUND       Allergies:  Morphine, Prednisone, and Trazodone    Diagnosis:   Diagnosis ICD-10-CM Associated Orders   1. Non-healing surgical wound, initial encounter  T81.89XA lidocaine (LMX) 4 % cream     Wound cleansing and dressings Surgical Anterior;Right Ankle     Wound Procedure Treatment Surgical Anterior;Right Ankle           Assessment and Plan :  Follow up evaluation of nonhealing surgical wound on Right ankle s/p ORIF continues to progressively healing.    Change wound management to Woun'dres on wound bed, see wound orders below.  Continue compression with compression stockings, applied Spandagrip in office today.  No harsh cleansers such as alcohol, peroxide, or antibacterial soap, do not submerge in water such as bathtub, hot tub, swimming pool, ocean, etc.   Can cleanse with mild soap and water or NSS at dressing changes.   Continue frequent elevation of leg and increase exercise/walking for wound healing.  Counseled on smoking cessation.  Continue adequate protein intake (chicken, fish, yogurt, eggs and nuts), 3-4 servings per day.   Follow up with vascular studies as ordered.  Follow-up with vascular surgeon for evaluation and possible intervention for venous disease due to tobacco abuse and non-healing surgical wound.  Follow-up in 2 week(s) or call sooner with questions or concerns or any signs of infection such as redness, swelling, increased/purulent drainage, fever, chills, increased severe pain.     Subjective:   2/12: Patient is a 59 y.o. female with pmhx HTN, HLD, Tobacco Abuse (0.5 ppd), chronic back pain, h/o Tonsillar cancer, Obesity, Continuous opioid dependence, h/o RLE DVT, Heart murmur, Bilateral lower extremity edema and insomnia who return to [facility] for follow up evaluation of nonhealing surgical wound on R medial ankle  which has been present since 6/21/21 s/p ORIF of bimalleolar ankle. Pt was last seen at clinic on 12/30 and was unable to continue follow ups due to lack of insurance. She has been applying silver alginate to wound bed.  No diabetes.  No ETOH or drug use. Pt denies any sob, fatigue, N/V, CP, fevers or chills.    2/19: Patient presents for followup evaluation of nonhealing surgical wound on Right ankle s/p ORIF.  No new complaints. No increased pain or drainage.  Has been using Exufiber Ag on the wound bed.  Pt denies any fevers or chills.    3/17: Patient presents for followup evaluation of nonhealing surgical wound on Right ankle s/p ORIF. Pt states dressing has been adhering to wound bed.  Has been applying Purachol Ag on the wound bed.  Pt denies any fevers or chills.            The following portions of the patient's history were reviewed and updated as appropriate:   Patient Active Problem List   Diagnosis    Smoking    Back pain, chronic    Dyslipidemia, goal LDL below 130    Tonsillar cancer (HCC)    HTN, goal below 140/90    Obesity, morbid (HCC)    Continuous opioid dependence (HCC)    Heart murmur, systolic    Wound of right ankle    Smoking greater than 30 pack years    Bilateral lower extremity edema    Encounter for screening mammogram for malignant neoplasm of breast    Primary insomnia    Atypical ductal hyperplasia of right breast    Malignant neoplasm of upper-outer quadrant of right breast in female, estrogen receptor positive (HCC)     Past Medical History:   Diagnosis Date    Acute encephalopathy 06/26/2021    Anxiety 2/12/2025    Bimalleolar fracture of right ankle 06/11/2021    Breast cyst 1980    Cancer (HCC)     Chronic back pain     Fibrocystic breast     HTN (hypertension) 06/21/2021    Hypertension     Migraine     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    Non Hodgkin's lymphoma (HCC)     Obesity     Right leg DVT (HCC) 09/19/2013     Past Surgical History:   Procedure Laterality Date    BREAST BIOPSY  1980     [place], [place]    BREAST CYST EXCISION Right     40 years ago    BREAST LUMPECTOMY  1980    HYSTERECTOMY      LYMPH NODE BIOPSY      MI OPEN TREATMENT BIMALLEOLAR ANKLE FRACTURE Right 06/21/2021    Procedure: OPEN REDUCTION W/ INTERNAL FIXATION (ORIF) RIGHT ANKLE BIMALLEOLAR FRACTURE;  Surgeon: Joe Bill MD;  Location: [facility];  Service: Orthopedics    TONSILLECTOMY      US GUIDANCE BREAST BIOPSY RIGHT EACH ADDITIONAL Right 02/22/2025    US GUIDANCE BREAST BIOPSY RIGHT EACH ADDITIONAL Right 02/22/2025    US GUIDANCE BREAST BIOPSY RIGHT EACH ADDITIONAL Right 02/22/2025    US GUIDED BREAST BIOPSY RIGHT COMPLETE Right 02/22/2025     Family History   Problem Relation Age of Onset    Lung cancer Mother     COPD Mother     Skin cancer Father     Hypertension Father     Alcohol abuse Father     Depression Father     Diabetes Father     Breast cancer Maternal Aunt     Stomach cancer Maternal Grandmother     Cancer Maternal Grandmother     Completed Suicide  Maternal Grandfather     Ovarian cancer Neg Hx      Social History     Socioeconomic History    Marital status: /Civil Union     Spouse name: None    Number of children: None    Years of education: None    Highest education level: None   Occupational History    None   Tobacco Use    Smoking status: Every Day     Current packs/day: 0.50     Average packs/day: 0.5 packs/day for 46.2 years (23.1 ttl pk-yrs)     Types: Cigarettes     Start date: 1/9/1979    Smokeless tobacco: Never   Vaping Use    Vaping status: Never Used   Substance and Sexual Activity    Alcohol use: Not Currently    Drug use: Not Currently    Sexual activity: Not Currently     Partners: Male     Birth control/protection: Female Sterilization   Other Topics Concern    None   Social History Narrative    None     Social Drivers of Health     Financial Resource Strain: Not on file   Food Insecurity: Patient Declined (11/8/2024)    Hunger Vital Sign     Worried About  Running Out of Food in the Last Year: Patient declined     Ran Out of Food in the Last Year: Patient declined   Transportation Needs: No Transportation Needs (11/8/2024)    PRAPARE - Transportation     Lack of Transportation (Medical): No     Lack of Transportation (Non-Medical): No   Physical Activity: Not on file   Stress: Not on file   Social Connections: Unknown (6/18/2024)    Received from [employer]    Social [employer]     How often do you feel lonely or isolated from those around you? (Adult - for ages 18 years and over): Not on file   Intimate Partner Violence: Not on file   Housing Stability: Low Risk  (11/8/2024)    Housing Stability Vital Sign     Unable to Pay for Housing in the Last Year: No     Number of Times Moved in the Last Year: 1     Homeless in the Last Year: No       Current Outpatient Medications:     aspirin (ECOTRIN LOW STRENGTH) 81 mg EC tablet, Take 162 mg by mouth daily, Disp: , Rfl:     buprenorphine-naloxone (SUBOXONE) 8-2 mg per SL tablet, place 1 tablet under the tongue and ALLOW TO dissolve once daily, Disp: , Rfl:     furosemide (LASIX) 20 mg tablet, take 1 tablet by mouth once daily, Disp: 90 tablet, Rfl: 5    nystatin (MYCOSTATIN) cream, Apply topically 2 (two) times a day for 10 days, Disp: 30 g, Rfl: 0    potassium chloride (Klor-Con M20) 20 mEq tablet, take 1 tablet by mouth once daily, Disp: 90 tablet, Rfl: 3    QUEtiapine (SEROquel) 25 mg tablet, take 1 tablet by mouth at bedtime, Disp: 90 tablet, Rfl: 2  No current facility-administered medications for this visit.    Review of Systems   Constitutional:  Negative for appetite change, chills, fatigue, fever and unexpected weight change.   HENT:  Negative for congestion, hearing loss and postnasal drip.    Respiratory:  Negative for cough and shortness of breath.    Cardiovascular:  Positive for leg swelling.   Skin:  Positive for wound (R medial ankle). Negative for rash.   Neurological:  Negative for numbness.    Hematological:  Does not bruise/bleed easily.   Psychiatric/Behavioral: Negative.           Objective:  /77   Pulse 86   Temp 97.7 °F (36.5 °C)   Resp 20   LMP  (LMP Unknown)   Pain Score: 0-No pain     Physical Exam  Vitals reviewed.   Constitutional:       General: She is not in acute distress.     Appearance: Normal appearance. She is well-developed. She is obese.   HENT:      Head: Normocephalic and atraumatic.   Cardiovascular:      Rate and Rhythm: Normal rate.      Pulses:           Dorsalis pedis pulses are 2+ on the right side.        Posterior tibial pulses are 2+ on the right side.   Pulmonary:      Effort: Pulmonary effort is normal.   Musculoskeletal:         General: No deformity.      Right lower leg: Edema present.      Left lower leg: Edema present.        Feet:    Feet:      Comments: Pink granulated wound bed with serosanguinous drainage, see wound assessment.  Skin:     General: Skin is warm and dry.      Findings: Wound (R medial ankle) present.   Neurological:      General: No focal deficit present.      Mental Status: She is alert and oriented to person, place, and time.   Psychiatric:         Mood and Affect: Mood and affect normal.         Behavior: Behavior normal. Behavior is cooperative.            Wound 12/18/24 Surgical Ankle Anterior;Right (Active)   Wound Description Pink;Yellow 03/17/25 0931   Non-staged Wound Description Full thickness 03/17/25 0931   Wound Length (cm) 0.6 cm 03/17/25 0931   Wound Width (cm) 0.2 cm 03/17/25 0931   Wound Depth (cm) 0.1 cm 03/17/25 0931   Wound Surface Area (cm^2) 0.12 cm^2 03/17/25 0931   Wound Volume (cm^3) 0.012 cm^3 03/17/25 0931   Calculated Wound Volume (cm^3) 0.01 cm^3 03/17/25 0931   Change in Wound Size % 97.92 03/17/25 0931   Drainage Amount Scant 03/17/25 0931   Drainage Description Serosanguineous;Yellow 03/17/25 0931   Vernell-wound Assessment Fragile;Scar Tissue 03/17/25 0931   Dressing Status Intact 03/17/25 0931       Results  "from last 6 Months   Lab Units 11/08/24  1543   WOUND CULTURE  1+ Growth of Candida parapsilosis*  1+ Growth of Staphylococcus coagulase negative*         Wound Instructions:  Orders Placed This Encounter   Procedures    Wound cleansing and dressings Surgical Anterior;Right Ankle     RIGHT ANKLE WOUND     Change dressing 3 times a week.      You may remove the dressing and shower.   Do not leave wound open to air, apply new dressing immediately.     Cleanse the wound with wound cleanser or mild soap and water, rinse, pat dry.     Apply Woun'Dres to the open wound.   Cover with gauze.   Secure with rolled gauze and tape.    Change dressing EVERY OTHER DAY.     Standing Status:   Future     Expiration Date:   3/24/2025    Wound Procedure Treatment Surgical Anterior;Right Ankle     This order was created via procedure documentation       Carole Ruiz PA-C, [facility]      Portions of the record may have been created with voice recognition software. Occasional wrong word or \"sound alike\" substitutions may have occurred due to the inherent limitations of voice recognition software. Read the chart carefully and recognize, using context, where substitutions have occurred.      "

## 2025-03-17 NOTE — PATIENT INSTRUCTIONS
Orders Placed This Encounter   Procedures    Wound cleansing and dressings Surgical Anterior;Right Ankle     RIGHT ANKLE WOUND     Change dressing 3 times a week.      You may remove the dressing and shower.   Do not leave wound open to air, apply new dressing immediately.     Cleanse the wound with wound cleanser or mild soap and water, rinse, pat dry.     Apply Woun'Dres to the open wound.   Cover with gauze.   Secure with rolled gauze and tape.    Change dressing EVERY OTHER DAY.     Standing Status:   Future     Expiration Date:   3/24/2025

## 2025-03-17 NOTE — PROGRESS NOTES
Wound Procedure Treatment Surgical Anterior;Right Ankle    Performed by: Leann Orozco RN  Authorized by: Carole Ruiz PA-C  Associated wounds:   Wound 12/18/24 Surgical Ankle Anterior;Right    Wound cleansed with:  NSS   Applied topical:  Woun'Dres   Applied secondary dressing:  Gauze   Dressing secured with:  [place], Tape and Size G

## 2025-03-18 ENCOUNTER — PATIENT OUTREACH (OUTPATIENT)
Dept: HEMATOLOGY ONCOLOGY | Facility: CLINIC | Age: 60
End: 2025-03-18

## 2025-03-18 NOTE — PROGRESS NOTES
Breast Oncology Nurse Navigator    Called patient for initial outreach from nurse navigator.  Left message with  Len, including office hours.  Requested a call back.  Len states he will have the patient return my call when she gets home.

## 2025-03-19 ENCOUNTER — PATIENT OUTREACH (OUTPATIENT)
Dept: CASE MANAGEMENT | Facility: HOSPITAL | Age: 60
End: 2025-03-19

## 2025-03-19 NOTE — PROGRESS NOTES
Attempted to reach pt again this morning, no answer, LM for her on the home answering machine with my direct number for a return call at her convenience.  Closing OncSW episode at this time - I will assess for needs if pt returns the call but will not continue to reach out.  Pt can be referred again in the future if needed.

## 2025-03-20 ENCOUNTER — PATIENT OUTREACH (OUTPATIENT)
Dept: HEMATOLOGY ONCOLOGY | Facility: CLINIC | Age: 60
End: 2025-03-20

## 2025-03-20 NOTE — PROGRESS NOTES
Breast Oncology Nurse Navigator    Called patient for initial outreach from nurse navigator.  Introduced myself and my role as well as members of the navigation team.  Oncology Nurse Navigator will follow patient until they are seen by surgical oncology, after which the patient navigator initiate outreach and follow the patient to survivorship.      Referral received from [facility].  Breast cancer diagnosis was made after screening mammogram detected an abnormality and further diagnostics were performed.  Patient states basic understanding/awareness of diagnosis.  Knows of her upcoming appointment to see Dr Rivera on 3/24/25 and the [facility] on 3/28/25 for MELVA placement.    Does patient have a PCP?  yes  Confirm name. Jodi Sandhu   Any other issues/diagnoses?  yes.  Patient has been dealing with a non-healing wound since 2021 after an injury.  Has been followed by the wound center for wound to the right ankle.  Patient states that the wound is finally almost healed.  Any implanted devices?  no  Is patient a current smoker: yes, patient was referred to Smoking Cessation but did not follow through.  Attempting to quit on her own.  States she is down to smoking about 1/4 PPD.    Patient lives with her .  Support system includes:  and two daughters.  Referral placed to oncology social worker: no, previously referred  Patient has transportation to appointments.    Cancer family history includes: Mother had lung cancer, MGM had stomach cancer, maternal aunt had breast cancer, father had skin cancer   Referral to oncology genetics: yes genetic testing completed.  Patient asked how she has breast cancer if genetic testing was negative.  Explained that there are other causes for breast cancer besides genetic causes.  Patient states understanding.    Do you have a history of cancer? yes - Patient had HPV+ tonsillar cancer around 2019.  Treated with surgery only.  Have you ever received Radiation  Therapy? No  Do you have any implantable devices? No     Is patient pre/post menopausal?  post menopausal had a hysterectomy    Discussed the Cancer Support Community and some of the programs and services offered, both in person and virtually.  Information sent via [employer].    Patient has my contact information and knows she can reach out with questions.  Office hours provided.  Patient provided with the phone number for [facility]-[phone].  General assessment completed.  Patient does have Rover set up  Rover message sent with our team's contact information.

## 2025-03-24 ENCOUNTER — OFFICE VISIT (OUTPATIENT)
Dept: SURGICAL ONCOLOGY | Facility: CLINIC | Age: 60
End: 2025-03-24
Payer: COMMERCIAL

## 2025-03-24 VITALS
HEART RATE: 90 BPM | SYSTOLIC BLOOD PRESSURE: 138 MMHG | OXYGEN SATURATION: 98 % | DIASTOLIC BLOOD PRESSURE: 84 MMHG | BODY MASS INDEX: 34.53 KG/M2 | WEIGHT: 220 LBS | HEIGHT: 67 IN | TEMPERATURE: 97.9 F

## 2025-03-24 DIAGNOSIS — N60.91 ATYPICAL DUCTAL HYPERPLASIA OF RIGHT BREAST: ICD-10-CM

## 2025-03-24 DIAGNOSIS — C50.911 INFILTRATING DUCTAL CARCINOMA OF RIGHT FEMALE BREAST (HCC): ICD-10-CM

## 2025-03-24 DIAGNOSIS — C09.9 TONSILLAR CANCER (HCC): ICD-10-CM

## 2025-03-24 DIAGNOSIS — C50.411 MALIGNANT NEOPLASM OF UPPER-OUTER QUADRANT OF RIGHT BREAST IN FEMALE, ESTROGEN RECEPTOR POSITIVE (HCC): Primary | ICD-10-CM

## 2025-03-24 DIAGNOSIS — Z17.0 MALIGNANT NEOPLASM OF UPPER-OUTER QUADRANT OF RIGHT BREAST IN FEMALE, ESTROGEN RECEPTOR POSITIVE (HCC): Primary | ICD-10-CM

## 2025-03-24 PROCEDURE — 99204 OFFICE O/P NEW MOD 45 MIN: CPT | Performed by: SURGERY

## 2025-03-24 PROCEDURE — 99214 OFFICE O/P EST MOD 30 MIN: CPT | Performed by: SURGERY

## 2025-03-24 RX ORDER — ACETAMINOPHEN AND CODEINE PHOSPHATE 300; 30 MG/1; MG/1
1 TABLET ORAL EVERY 6 HOURS PRN
Qty: 20 TABLET | Refills: 0 | Status: SHIPPED | OUTPATIENT
Start: 2025-03-24

## 2025-03-24 NOTE — H&P (VIEW-ONLY)
Name: Emma Brock      : 1965      MRN: [id number]  Encounter Provider: Luis Rivera MD  Encounter Date: 3/24/2025   Encounter department: [facility] [place]  :  Assessment & Plan  Malignant neoplasm of upper-outer quadrant of right breast in female, estrogen receptor positive (HCC)         Infiltrating ductal carcinoma of right female breast (HCC)         Atypical ductal hyperplasia of right breast         59-year-old female with right breast multifocal breast carcinoma she is here after staging workup.  CT chest abdomen pelvis and bone scans were reviewed and discussed.  After our extensive discussion consent was obtained for right breast conservation surgery with dual tracer technique sentinel lymph node biopsy with bracketed lumpectomy with understanding based on final pathology she may need additional procedures if the margins are positive.  Surgical consent was obtained after explaining the benefit procedure alternative possible complications such as bleeding infection injury to surrounding structures lymphedema recurrence and need additional procedures and treatments were all reviewed and discussed.  Will arrange surgery.  We did discuss the cancer disease status cancer treatment plan and cancer treatment goals with the patient in detail.  I did spend more than 60 reviewing the films discussing surgical options and obtaining surgical concerns.    Survivorship  Discussed symptoms related to disease recurrence, Yes     Evaluated for late effects related to cancer treatment, Yes     Screening current for cervical cancer, Yes     Screening current for colon cancer, Yes     Cancer rehabilitation services addressed, Yes     Screening current for osteoporosis, No     Oncology Treatment Summary reviewed with patient and copy provided, Yes     Referral placed for psychosocial evaluation/screening to oncology social work  No    History of Present Illness  "  Emma Brock is a 59 y.o. year old female who presents with right breast cancer post staging workup..     Oncology History   Cancer Staging   No matching staging information was found for the patient.  Oncology History   Malignant neoplasm of upper-outer quadrant of right breast in female, estrogen receptor positive (HCC)   2/22/2025 Initial Diagnosis    Malignant neoplasm of upper-outer quadrant of right breast in female, estrogen receptor positive (HCC)     2/22/2025 Biopsy    RIGHT breast ultrasound-guided  A.0900 o'clock, 6 cmfn cm from nipple (butterfly)  Adenosis and sclerosing adenosis    B. 1000 3 cmfn (hat)  Atypical Ductal Hyperplasia    C.1000 5cmfn (cylinder)  Adenosis and sclerosing adenosis,    D. 1000 6 cmfn (MELVA)  Invasive Ductal Hyperplasia with DCIS  Grade 2   AL 60 HER2 0    Concordant. Multifocal. Malignancy measures up to 8mm on US. ADH measures 4mm on US. Axillary US clear. Contralateral breast clear.  IDC and ADH span approximately 4.5 cm.         Review of Systems A complete review of systems is negative other than that noted above in the HPI.    Medical History Reviewed by provider this encounter:  Tobacco  Allergies  Meds  Problems  Med Hx  Surg Hx  Fam Hx     .       Objective   /84 (BP Location: Left arm, Patient Position: Sitting)   Pulse 90   Temp 97.9 °F (36.6 °C) (Temporal)   Ht 5' 7\" (1.702 m)   Wt 99.8 kg (220 lb)   LMP  (LMP Unknown)   SpO2 98%   BMI 34.46 kg/m²     Pain Screening:     ECOG    Physical Exam  Constitutional:       Appearance: Normal appearance.   HENT:      Head: Normocephalic and atraumatic.      Nose: Nose normal.      Mouth/Throat:      Mouth: Mucous membranes are moist.   Eyes:      Pupils: Pupils are equal, round, and reactive to light.   Cardiovascular:      Rate and Rhythm: Normal rate.      Pulses: Normal pulses.      Heart sounds: Normal heart sounds.   Pulmonary:      Effort: Pulmonary effort is normal.      Breath sounds: " Normal breath sounds.   Chest:          Comments: Right breast is tender at biopsy sites.  She had at least 4 biopsies.  Bilateral breast no palpable mass nipple discharge nipple retraction or skin changes.  Bilateral axilla and supraclavicular examination no palpable adenopathy.  Patient was examined seated as well as supine position.  Abdominal:      General: Bowel sounds are normal.      Palpations: Abdomen is soft.   Musculoskeletal:         General: Normal range of motion.      Cervical back: Normal range of motion and neck supple.   Skin:     General: Skin is warm.   Neurological:      General: No focal deficit present.      Mental Status: She is alert and oriented to person, place, and time.   Psychiatric:         Mood and Affect: Mood normal.         Behavior: Behavior normal.         Thought Content: Thought content normal.         Judgment: Judgment normal.          Labs: I have reviewed pertinent labs.   Appointment on 03/03/2025   Component Date Value Ref Range Status    Creatinine 03/03/2025 0.71  0.60 - 1.30 mg/dL Final    Standardized to IDMS reference method    eGFR 03/03/2025 92  ml/min/1.73sq m Final    BUN 03/03/2025 12  5 - 25 mg/dL Final   Appointment on 03/01/2025   Component Date Value Ref Range Status    GENETIC ANALYSIS OVERALL INTERPRET* 03/03/2025 NEGATIVE: No Clinically Significant Variants Detected   Final    INTERPRETATION 03/03/2025 See Notes   Final    No pathogenic mutations, variants of unknown significance, or gross deletions or duplications were detected.  Risk Estimate: low likelihood of variants in the genes analyzed contributing to this individual's clinical history.  Genetic counseling is a recommended option for all individuals undergoing genetic testing.  Genes Analyzed (13 total): BRADLEY, BARD1, BRCA1, BRCA2, CDH1, CHEK2, NF1, PALB2, PTEN, RAD51C, RAD51D, STK11 and TP53 (sequencing and deletion/duplication).    GENES NOT REPORTED 03/03/2025  BRADLEY,BARD1,CDH1,CHEK2,PALB2,PTEN,RAD51C,RAD51D,STK11,TP53,NF1,BRCA1,BRCA2   Final    GENETIC ANALYSIS OVERALL INTERPRET* 03/03/2025 Variant of Unknown Significance Detected   Final    INTERPRETATION 03/03/2025 See Notes   Final    Comment: No known clinically actionable alterations were detected.  One variant of unknown significance was detected in the SDHA gene.  Risk Estimate: should be based on clinical and family history, as the clinical significance of this result is unknown.  Genetic counseling is a recommended option for all individuals undergoing genetic testing.  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 Refer to the supplementary pages for additional information on this variant. No additional pathogenic mutations, variants of unknown significance, or gross deletions or duplications were detected. Genes Analyzed (59 total): APC, BRADLEY, BAP1, BARD1, BMPR1A, BRCA1, BRCA2, BRIP1, CDH1, CDK4, CDKN1B, CDKN2A, CHEK2, DICER1, FH, FLCN, KIF1B, MAX, MEN1, MET, MLH1, MSH2, MSH6, MUTYH, NF1, NTHL1, PALB2, PMS2, POT1, PTEN, RAD51C, RAD51D, RB1, RET, SDHA, SDHAF2, SDHB, SDHC, SDHD, SMAD4,                            SMARCA4, STK11, AOWS871, TP53, TSC1, TSC2 and VHL (sequencing and deletion/duplication); AXIN2, CTNNA1, EGLN1, HOXB13, KIT, MITF, MSH3, PDGFRA, POLD1 and POLE (sequencing only); EPCAM and GREM1 (deletion/duplication only). RNA data is routinely analyzed for use in variant interpretation for all genes.    GENES NOT REPORTED 03/03/2025    Final                     Value:APC,BRADLEY,AXIN2,BAP1,BARD1,BMPR1A,BRCA1,BRCA2,BRIP1,CDH1,CDK4,CDKN1B,CDKN2A,CHEK2,CTNNA1,DICER1,EGLN1,EPCAM,FH,FLCN,GREM1,HOXB13,KIF1B,KIT,MAX,MEN1,MET,MITF,MLH1,MSH2,MSH3,MSH6,MUTYH,NF1,NTHL1,PALB2,PDGFRA,PMS2,POLD1,POLE,POT1,PTEN,RAD51C,RAD51D,RB1,RET,SD  HAF2,SDHB,SDHC,SDHD,SMAD4,SMARCA4,STK11,TOVW306,TP53,TSC1,TSC2,VHL     Pathology: I have reviewed pathology reports described above.    Radiology Results Review: I personally reviewed the following image studies in PACS and associated radiology reports: CT chest and CT abdomen/pelvis. My interpretation of the radiology images/reports is: Agree with radiology radiology reports..  [place]: yes

## 2025-03-24 NOTE — PROGRESS NOTES
Name: Emma Brock      : 1965      MRN: [id number]  Encounter Provider: Luis Rivera MD  Encounter Date: 3/24/2025   Encounter department: [facility] [place]  :  Assessment & Plan  Malignant neoplasm of upper-outer quadrant of right breast in female, estrogen receptor positive (HCC)         Infiltrating ductal carcinoma of right female breast (HCC)         Atypical ductal hyperplasia of right breast         59-year-old female with right breast multifocal breast carcinoma she is here after staging workup.  CT chest abdomen pelvis and bone scans were reviewed and discussed.  After our extensive discussion consent was obtained for right breast conservation surgery with dual tracer technique sentinel lymph node biopsy with bracketed lumpectomy with understanding based on final pathology she may need additional procedures if the margins are positive.  Surgical consent was obtained after explaining the benefit procedure alternative possible complications such as bleeding infection injury to surrounding structures lymphedema recurrence and need additional procedures and treatments were all reviewed and discussed.  Will arrange surgery.  We did discuss the cancer disease status cancer treatment plan and cancer treatment goals with the patient in detail.  I did spend more than 60 reviewing the films discussing surgical options and obtaining surgical concerns.    Survivorship  Discussed symptoms related to disease recurrence, Yes     Evaluated for late effects related to cancer treatment, Yes     Screening current for cervical cancer, Yes     Screening current for colon cancer, Yes     Cancer rehabilitation services addressed, Yes     Screening current for osteoporosis, No     Oncology Treatment Summary reviewed with patient and copy provided, Yes     Referral placed for psychosocial evaluation/screening to oncology social work  No    History of Present Illness  "  Emma Brokc is a 59 y.o. year old female who presents with right breast cancer post staging workup..     Oncology History   Cancer Staging   No matching staging information was found for the patient.  Oncology History   Malignant neoplasm of upper-outer quadrant of right breast in female, estrogen receptor positive (HCC)   2/22/2025 Initial Diagnosis    Malignant neoplasm of upper-outer quadrant of right breast in female, estrogen receptor positive (HCC)     2/22/2025 Biopsy    RIGHT breast ultrasound-guided  A.0900 o'clock, 6 cmfn cm from nipple (butterfly)  Adenosis and sclerosing adenosis    B. 1000 3 cmfn (hat)  Atypical Ductal Hyperplasia    C.1000 5cmfn (cylinder)  Adenosis and sclerosing adenosis,    D. 1000 6 cmfn (MELVA)  Invasive Ductal Hyperplasia with DCIS  Grade 2   W[place] 60 HER2 0    Concordant. Multifocal. Malignancy measures up to 8mm on US. ADH measures 4mm on US. Axillary US clear. Contralateral breast clear.  IDC and ADH span approximately 4.5 cm.         Review of Systems A complete review of systems is negative other than that noted above in the HPI.    Medical History Reviewed by provider this encounter:  Tobacco  Allergies  Meds  Problems  Med Hx  Surg Hx  Fam Hx     .       Objective   /84 (BP Location: Left arm, Patient Position: Sitting)   Pulse 90   Temp 97.9 °F (36.6 °C) (Temporal)   Ht 5' 7\" (1.702 m)   Wt 99.8 kg (220 lb)   LMP  (LMP Unknown)   SpO2 98%   BMI 34.46 kg/m²     Pain Screening:     ECOG    Physical Exam  Constitutional:       Appearance: Normal appearance.   HENT:      Head: Normocephalic and atraumatic.      Nose: Nose normal.      Mouth/Throat:      Mouth: Mucous membranes are moist.   Eyes:      Pupils: Pupils are equal, round, and reactive to light.   Cardiovascular:      Rate and Rhythm: Normal rate.      Pulses: Normal pulses.      Heart sounds: Normal heart sounds.   Pulmonary:      Effort: Pulmonary effort is normal.      Breath sounds: " Normal breath sounds.   Chest:          Comments: Right breast is tender at biopsy sites.  She had at least 4 biopsies.  Bilateral breast no palpable mass nipple discharge nipple retraction or skin changes.  Bilateral axilla and supraclavicular examination no palpable adenopathy.  Patient was examined seated as well as supine position.  Abdominal:      General: Bowel sounds are normal.      Palpations: Abdomen is soft.   Musculoskeletal:         General: Normal range of motion.      Cervical back: Normal range of motion and neck supple.   Skin:     General: Skin is warm.   Neurological:      General: No focal deficit present.      Mental Status: She is alert and oriented to person, place, and time.   Psychiatric:         Mood and Affect: Mood normal.         Behavior: Behavior normal.         Thought Content: Thought content normal.         Judgment: Judgment normal.          Labs: I have reviewed pertinent labs.   Appointment on 03/03/2025   Component Date Value Ref Range Status    Creatinine 03/03/2025 0.71  0.60 - 1.30 mg/dL Final    Standardized to IDMS reference method    eGFR 03/03/2025 92  ml/min/1.73sq m Final    BUN 03/03/2025 12  5 - 25 mg/dL Final   Appointment on 03/01/2025   Component Date Value Ref Range Status    GENETIC ANALYSIS OVERALL INTERPRET* 03/03/2025 NEGATIVE: No Clinically Significant Variants Detected   Final    INTERPRETATION 03/03/2025 See Notes   Final    No pathogenic mutations, variants of unknown significance, or gross deletions or duplications were detected.  Risk Estimate: low likelihood of variants in the genes analyzed contributing to this individual's clinical history.  Genetic counseling is a recommended option for all individuals undergoing genetic testing.  Genes Analyzed (13 total): BRADLEY, BARD1, BRCA1, BRCA2, CDH1, CHEK2, NF1, PALB2, PTEN, RAD51C, RAD51D, STK11 and TP53 (sequencing and deletion/duplication).    GENES NOT REPORTED 03/03/2025  BRADLEY,BARD1,CDH1,CHEK2,PALB2,PTEN,RAD51C,RAD51D,STK11,TP53,NF1,BRCA1,BRCA2   Final    GENETIC ANALYSIS OVERALL INTERPRET* 03/03/2025 Variant of Unknown Significance Detected   Final    INTERPRETATION 03/03/2025 See Notes   Final    Comment: No known clinically actionable alterations were detected.  One variant of unknown significance was detected in the SDHA gene.  Risk Estimate: should be based on clinical and family history, as the clinical significance of this result is unknown.  Genetic counseling is a recommended option for all individuals undergoing genetic testing.  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 Refer to the supplementary pages for additional information on this variant. No additional pathogenic mutations, variants of unknown significance, or gross deletions or duplications were detected. Genes Analyzed (59 total): APC, BRADLEY, BAP1, BARD1, BMPR1A, BRCA1, BRCA2, BRIP1, CDH1, CDK4, CDKN1B, CDKN2A, CHEK2, DICER1, FH, FLCN, KIF1B, MAX, MEN1, MET, MLH1, MSH2, MSH6, MUTYH, NF1, NTHL1, PALB2, PMS2, POT1, PTEN, RAD51C, RAD51D, RB1, RET, SDHA, SDHAF2, SDHB, SDHC, SDHD, SMAD4,                            SMARCA4, STK11, NKUI987, TP53, TSC1, TSC2 and VHL (sequencing and deletion/duplication); AXIN2, CTNNA1, EGLN1, HOXB13, KIT, MITF, MSH3, PDGFRA, POLD1 and POLE (sequencing only); EPCAM and GREM1 (deletion/duplication only). RNA data is routinely analyzed for use in variant interpretation for all genes.    GENES NOT REPORTED 03/03/2025    Final                     Value:APC,BRADLEY,AXIN2,BAP1,BARD1,BMPR1A,BRCA1,BRCA2,BRIP1,CDH1,CDK4,CDKN1B,CDKN2A,CHEK2,CTNNA1,DICER1,EGLN1,EPCAM,FH,FLCN,GREM1,HOXB13,KIF1B,KIT,MAX,MEN1,MET,MITF,MLH1,MSH2,MSH3,MSH6,MUTYH,NF1,NTHL1,PALB2,PDGFRA,PMS2,POLD1,POLE,POT1,PTEN,RAD51C,RAD51D,RB1,RET,SD  HAF2,SDHB,SDHC,SDHD,SMAD4,SMARCA4,STK11,DSAO399,TP53,TSC1,TSC2,VHL     Pathology: I have reviewed pathology reports described above.    Radiology Results Review: I personally reviewed the following image studies in PACS and associated radiology reports: CT chest and CT abdomen/pelvis. My interpretation of the radiology images/reports is: Agree with radiology radiology reports..  Concordance: yes

## 2025-03-28 ENCOUNTER — TELEPHONE (OUTPATIENT)
Dept: FAMILY MEDICINE CLINIC | Facility: CLINIC | Age: 60
End: 2025-03-28

## 2025-03-28 ENCOUNTER — RA CDI HCC (OUTPATIENT)
Dept: OTHER | Facility: HOSPITAL | Age: 60
End: 2025-03-28

## 2025-03-28 ENCOUNTER — HOSPITAL ENCOUNTER (OUTPATIENT)
Dept: MAMMOGRAPHY | Facility: CLINIC | Age: 60
End: 2025-03-28
Payer: COMMERCIAL

## 2025-03-28 ENCOUNTER — HOSPITAL ENCOUNTER (OUTPATIENT)
Dept: ULTRASOUND IMAGING | Facility: CLINIC | Age: 60
End: 2025-03-28
Payer: COMMERCIAL

## 2025-03-28 VITALS — HEART RATE: 70 BPM | SYSTOLIC BLOOD PRESSURE: 165 MMHG | DIASTOLIC BLOOD PRESSURE: 84 MMHG

## 2025-03-28 DIAGNOSIS — R92.8 ABNORMAL MAMMOGRAM: ICD-10-CM

## 2025-03-28 PROCEDURE — 19285 PERQ DEV BREAST 1ST US IMAG: CPT

## 2025-03-28 RX ORDER — LIDOCAINE HYDROCHLORIDE 10 MG/ML
5 INJECTION, SOLUTION EPIDURAL; INFILTRATION; INTRACAUDAL; PERINEURAL ONCE
Status: COMPLETED | OUTPATIENT
Start: 2025-03-28 | End: 2025-03-28

## 2025-03-28 RX ADMIN — LIDOCAINE HYDROCHLORIDE 5 ML: 10 INJECTION, SOLUTION EPIDURAL; INFILTRATION; INTRACAUDAL; PERINEURAL at 13:55

## 2025-03-28 NOTE — PROGRESS NOTES
Procedure type:    __x___ultrasound guided _____stereotactic _____ MRI guided    Breast:    _____Left ___x__Right    Location: 10 o'clock 3 cmfn     Needle: 16 gauge 7.5     # of passes: N/A     Clip: MELVA      Performed by: Dr. Kashif Escobar     Pressure held for 5 minutes by: Emma Hanley     Sterpapito Strips:    _____yes __x___no    Band aid:    __x___yes_____no    Tape and guaze:    _____yes __x___no    Tolerated procedure:    ___x__yes _____no

## 2025-03-28 NOTE — PROGRESS NOTES
Ice pack given:    __X___yes _____no    Discharge instructions reviewed and given to patient:    __X___yes _____no    Discharged via:    __X___amulatory    _____wheelchair    _____stretcher    Biopsy site clean and dry with no bleeding on discharge:    __X___yes ____no  Patient is scheduled for surgery with Dr. Enmanuel Rivera.  Ok to leave a message.

## 2025-03-31 ENCOUNTER — OFFICE VISIT (OUTPATIENT)
Dept: FAMILY MEDICINE CLINIC | Facility: CLINIC | Age: 60
End: 2025-03-31
Payer: COMMERCIAL

## 2025-03-31 ENCOUNTER — OFFICE VISIT (OUTPATIENT)
Dept: WOUND CARE | Facility: CLINIC | Age: 60
End: 2025-03-31
Payer: COMMERCIAL

## 2025-03-31 ENCOUNTER — APPOINTMENT (OUTPATIENT)
Dept: RADIOLOGY | Facility: CLINIC | Age: 60
End: 2025-03-31
Payer: COMMERCIAL

## 2025-03-31 ENCOUNTER — APPOINTMENT (OUTPATIENT)
Dept: LAB | Facility: CLINIC | Age: 60
End: 2025-03-31
Payer: COMMERCIAL

## 2025-03-31 VITALS
BODY MASS INDEX: 35.63 KG/M2 | OXYGEN SATURATION: 95 % | HEIGHT: 67 IN | TEMPERATURE: 96.2 F | SYSTOLIC BLOOD PRESSURE: 136 MMHG | DIASTOLIC BLOOD PRESSURE: 82 MMHG | HEART RATE: 113 BPM | WEIGHT: 227 LBS

## 2025-03-31 VITALS
TEMPERATURE: 98.1 F | SYSTOLIC BLOOD PRESSURE: 174 MMHG | HEART RATE: 98 BPM | DIASTOLIC BLOOD PRESSURE: 86 MMHG | RESPIRATION RATE: 20 BRPM

## 2025-03-31 DIAGNOSIS — C50.411 MALIGNANT NEOPLASM OF UPPER-OUTER QUADRANT OF RIGHT BREAST IN FEMALE, ESTROGEN RECEPTOR POSITIVE (HCC): ICD-10-CM

## 2025-03-31 DIAGNOSIS — R73.9 ELEVATED BLOOD SUGAR: ICD-10-CM

## 2025-03-31 DIAGNOSIS — I10 HTN, GOAL BELOW 140/90: ICD-10-CM

## 2025-03-31 DIAGNOSIS — Z17.0 MALIGNANT NEOPLASM OF UPPER-OUTER QUADRANT OF RIGHT BREAST IN FEMALE, ESTROGEN RECEPTOR POSITIVE (HCC): ICD-10-CM

## 2025-03-31 DIAGNOSIS — R73.9 ELEVATED BLOOD SUGAR: Primary | ICD-10-CM

## 2025-03-31 DIAGNOSIS — F17.210 SMOKING GREATER THAN 30 PACK YEARS: ICD-10-CM

## 2025-03-31 DIAGNOSIS — F51.01 PRIMARY INSOMNIA: ICD-10-CM

## 2025-03-31 DIAGNOSIS — F11.20 CONTINUOUS OPIOID DEPENDENCE (HCC): ICD-10-CM

## 2025-03-31 DIAGNOSIS — T81.89XA NON-HEALING SURGICAL WOUND, INITIAL ENCOUNTER: Primary | ICD-10-CM

## 2025-03-31 DIAGNOSIS — E66.01 OBESITY, MORBID (HCC): ICD-10-CM

## 2025-03-31 PROBLEM — IMO0001 SMOKING: Status: RESOLVED | Noted: 2021-06-21 | Resolved: 2025-03-31

## 2025-03-31 PROCEDURE — 99213 OFFICE O/P EST LOW 20 MIN: CPT | Performed by: NURSE PRACTITIONER

## 2025-03-31 PROCEDURE — G2211 COMPLEX E/M VISIT ADD ON: HCPCS | Performed by: NURSE PRACTITIONER

## 2025-03-31 PROCEDURE — 99213 OFFICE O/P EST LOW 20 MIN: CPT | Performed by: ORTHOPAEDIC SURGERY

## 2025-03-31 PROCEDURE — 36415 COLL VENOUS BLD VENIPUNCTURE: CPT

## 2025-03-31 PROCEDURE — 83036 HEMOGLOBIN GLYCOSYLATED A1C: CPT

## 2025-03-31 PROCEDURE — 71046 X-RAY EXAM CHEST 2 VIEWS: CPT

## 2025-03-31 PROCEDURE — 80053 COMPREHEN METABOLIC PANEL: CPT

## 2025-03-31 PROCEDURE — 85025 COMPLETE CBC W/AUTO DIFF WBC: CPT

## 2025-03-31 NOTE — PROGRESS NOTES
Post procedure call completed    Bleeding: _____yes __X___no (pt denies)    Pain: _____yes ___X___no (pt with soreness, used ice, no need for OTC pain meds)    Redness/Swelling: ______yes ___X___no (pt denies)    Band aid removed: ___X__yes _____no     Pt with no questions at this time, adv to call with any questions or concerns, has name/# for contact

## 2025-03-31 NOTE — PROGRESS NOTES
Name: Emma Brock      : 1965      MRN: [id number]  Encounter Provider: ALEKSANDER Anaya  Encounter Date: 3/31/2025   Encounter department: [facility]  :  Assessment & Plan  Continuous opioid dependence (HCC)  Taking the Suboxone and has T#3 for her pain following her breast surgery        Obesity, morbid (HCC)  Doing well and advised to lose weight     Elevated blood sugar    Orders:    Hemoglobin A1C; Future    HTN, goal below 140/90  Not on any current BP medications        Smoking greater than 30 pack years  Cutting back on her smoking        Malignant neoplasm of upper-outer quadrant of right breast in female, estrogen receptor positive (HCC)       Lumpectomy scheduled and is excited to have completed   Primary insomnia  Seroquel 25 mg at night                 History of Present Illness   Patient here today for follow up and reports that she has a recent diagnosis of breast cancer and plans for a lumpectomy and lymph node resection plans for  and then following possible radiation and unknown for chemotherapy. Four tumor areas were identified and she is looking forward to having her surgery. Dr. Hook.     Patient is also seeing wound center for her left ankle and is doing well on the wound care and excited this is healing for her.     She has plans for labs and chest x-ray planned.               Review of Systems   Constitutional:  Positive for fatigue. Negative for activity change, appetite change, chills, diaphoresis, fever and unexpected weight change.   HENT:  Negative for congestion, drooling, ear pain, hearing loss, postnasal drip, sinus pressure, sinus pain, sneezing and sore throat.    Eyes:  Negative for pain, redness and visual disturbance.   Respiratory:  Negative for apnea, cough, choking, chest tightness, shortness of breath, wheezing and stridor.    Cardiovascular:  Negative for chest pain, palpitations and leg swelling.   Gastrointestinal:   "Negative for abdominal distention, abdominal pain, anal bleeding, blood in stool, constipation, diarrhea, nausea, rectal pain and vomiting.   Endocrine: Negative.    Genitourinary: Negative.    Musculoskeletal:  Negative for arthralgias.   Skin: Negative.    Allergic/Immunologic: Negative.    Neurological:  Negative for dizziness and light-headedness.   Hematological: Negative.    Psychiatric/Behavioral:  Negative for behavioral problems and dysphoric mood.        Objective   /82 (BP Location: Left arm, Patient Position: Sitting, Cuff Size: Large)   Pulse (!) 113   Temp (!) 96.2 °F (35.7 °C)   Ht 5' 7\" (1.702 m)   Wt 103 kg (227 lb)   LMP  (LMP Unknown)   SpO2 95%   BMI 35.55 kg/m²      Physical Exam  Vitals and nursing note reviewed.   Constitutional:       General: She is not in acute distress.     Appearance: She is well-developed.   HENT:      Head: Normocephalic and atraumatic.   Eyes:      Pupils: Pupils are equal, round, and reactive to light.   Neck:      Thyroid: No thyromegaly.   Cardiovascular:      Rate and Rhythm: Normal rate and regular rhythm.      Heart sounds: Normal heart sounds. No murmur heard.     Comments: Wearing stockings b/l  Pulmonary:      Effort: Pulmonary effort is normal. No respiratory distress.      Breath sounds: Normal breath sounds. No wheezing.   Abdominal:      General: Bowel sounds are normal.      Palpations: Abdomen is soft.   Musculoskeletal:         General: Normal range of motion.      Cervical back: Normal range of motion.      Right lower le+ Pitting Edema present.      Left lower le+ Pitting Edema present.   Skin:     General: Skin is warm and dry.   Neurological:      Mental Status: She is alert and oriented to person, place, and time.         "

## 2025-03-31 NOTE — PROGRESS NOTES
Wound Procedure Treatment Surgical Anterior;Right Ankle    Performed by: Leann Orozco RN  Authorized by: Carole Ruiz PA-C  Associated wounds:   Wound 12/18/24 Surgical Ankle Anterior;Right    Wound cleansed with:  NSS   Applied topical:  Woun'Dres   Applied secondary dressing:  ABD and Gauze   Dressing secured with:  [place], Tape and Size G

## 2025-03-31 NOTE — PROGRESS NOTES
Patient ID: Emma Brock is a 59 y.o. female Date of Birth 1965       Chief Complaint   Patient presents with    Follow Up Wound Care Visit     RLE WOUND       Allergies:  Morphine, Prednisone, and Trazodone    Diagnosis:   Diagnosis ICD-10-CM Associated Orders   1. Non-healing surgical wound, initial encounter  T81.89XA Wound cleansing and dressings Surgical Anterior;Right Ankle     Wound Procedure Treatment Surgical Anterior;Right Ankle           Assessment and Plan :  Follow up evaluation of nonhealing surgical wound on Right ankle s/p ORIF significantly decreasing in size.   Continue wound management with Woun'dres on wound bed, see wound orders below.  Continue compression with compression stockings, applied Spandagrip in office today.  No harsh cleansers such as alcohol, peroxide, or antibacterial soap, do not submerge in water such as bathtub, hot tub, swimming pool, ocean, etc.   Can cleanse with mild soap and water or NSS at dressing changes.   Continue frequent elevation of leg and increase exercise/walking for wound healing.  Counseled on smoking cessation.  Continue adequate protein intake (chicken, fish, yogurt, eggs and nuts), 3-4 servings per day.   Follow up with vascular studies and vascular surgeon as ordered.   Follow-up in 2 week(s) or call sooner with questions or concerns or any signs of infection such as redness, swelling, increased/purulent drainage, fever, chills, increased severe pain.     Subjective:   2/12: Patient is a 59 y.o. female with pmhx HTN, HLD, Tobacco Abuse (0.5 ppd), chronic back pain, h/o Tonsillar cancer, Obesity, Continuous opioid dependence, h/o RLE DVT, Heart murmur, Bilateral lower extremity edema and insomnia who return to [facility] for follow up evaluation of nonhealing surgical wound on R medial ankle which has been present since 6/21/21 s/p ORIF of bimalleolar ankle. Pt was last seen at clinic on 12/30 and was unable to continue follow ups due to lack of  insurance. She has been applying silver alginate to wound bed.  No diabetes.  No ETOH or drug use. Pt denies any sob, fatigue, N/V, CP, fevers or chills.    2/19: Patient presents for followup evaluation of nonhealing surgical wound on Right ankle s/p ORIF.  No new complaints. No increased pain or drainage.  Has been using Exufiber Ag on the wound bed.  Pt denies any fevers or chills.    3/17: Patient presents for followup evaluation of nonhealing surgical wound on Right ankle s/p ORIF. Pt states dressing has been adhering to wound bed.  Has been applying Purachol Ag on the wound bed.  Pt denies any fevers or chills.    3/31: Patient presents for followup evaluation of nonhealing surgical wound on Right ankle s/p ORIF. Pt admits to not elevating her leg as frequently as possible. Has been applying Wioun'dres as directed. Pt denies any fevers or chills.              The following portions of the patient's history were reviewed and updated as appropriate:   Patient Active Problem List   Diagnosis    Smoking    Back pain, chronic    Dyslipidemia, goal LDL below 130    Tonsillar cancer (HCC)    HTN, goal below 140/90    Obesity, morbid (HCC)    Continuous opioid dependence (HCC)    Heart murmur, systolic    Wound of right ankle    Smoking greater than 30 pack years    Bilateral lower extremity edema    Encounter for screening mammogram for malignant neoplasm of breast    Primary insomnia    Atypical ductal hyperplasia of right breast    Malignant neoplasm of upper-outer quadrant of right breast in female, estrogen receptor positive (HCC)     Past Medical History:   Diagnosis Date    Acute encephalopathy 06/26/2021    Anxiety 2/12/2025    Bimalleolar fracture of right ankle 06/11/2021    Breast cyst 1980    Cancer (HCC)     Chronic back pain     Fibrocystic breast     HTN (hypertension) 06/21/2021    Hypertension     Migraine     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    Non Hodgkin's lymphoma (HCC)     Obesity     Right leg DVT (HCC)  09/19/2013     Past Surgical History:   Procedure Laterality Date    BREAST BIOPSY  1980    [place], [place]    BREAST CYST EXCISION Right     40 years ago    BREAST LUMPECTOMY  1980    HYSTERECTOMY      LYMPH NODE BIOPSY      WI OPEN TREATMENT BIMALLEOLAR ANKLE FRACTURE Right 06/21/2021    Procedure: OPEN REDUCTION W/ INTERNAL FIXATION (ORIF) RIGHT ANKLE BIMALLEOLAR FRACTURE;  Surgeon: Joe Bill MD;  Location: [facility];  Service: Orthopedics    TONSILLECTOMY      US BREAST CLIP NEEDLE LOC RIGHT Right 3/28/2025    US GUIDANCE BREAST BIOPSY RIGHT EACH ADDITIONAL Right 02/22/2025    US GUIDANCE BREAST BIOPSY RIGHT EACH ADDITIONAL Right 02/22/2025    US GUIDANCE BREAST BIOPSY RIGHT EACH ADDITIONAL Right 02/22/2025    US GUIDED BREAST BIOPSY RIGHT COMPLETE Right 02/22/2025     Family History   Problem Relation Age of Onset    Lung cancer Mother     COPD Mother     Skin cancer Father     Hypertension Father     Alcohol abuse Father     Depression Father     Diabetes Father     Breast cancer Maternal Aunt     Stomach cancer Maternal Grandmother     Cancer Maternal Grandmother     Completed Suicide  Maternal Grandfather     Ovarian cancer Neg Hx      Social History     Socioeconomic History    Marital status: /Civil Union     Spouse name: None    Number of children: None    Years of education: None    Highest education level: None   Occupational History    None   Tobacco Use    Smoking status: Every Day     Current packs/day: 0.50     Average packs/day: 0.5 packs/day for 46.2 years (23.1 ttl pk-yrs)     Types: Cigarettes     Start date: 1/9/1979    Smokeless tobacco: Never   Vaping Use    Vaping status: Never Used   Substance and Sexual Activity    Alcohol use: Not Currently    Drug use: Not Currently    Sexual activity: Not Currently     Partners: Male     Birth control/protection: Female Sterilization   Other Topics Concern    None   Social History Narrative    None     Social Drivers  of Health     Financial Resource Strain: Not on file   Food Insecurity: Patient Declined (11/8/2024)    Hunger Vital Sign     Worried About Running Out of Food in the Last Year: Patient declined     Ran Out of Food in the Last Year: Patient declined   Transportation Needs: No Transportation Needs (11/8/2024)    PRAPARE - Transportation     Lack of Transportation (Medical): No     Lack of Transportation (Non-Medical): No   Physical Activity: Not on file   Stress: Not on file   Social Connections: Unknown (6/18/2024)    Received from [employer]    Social Quosis     How often do you feel lonely or isolated from those around you? (Adult - for ages 18 years and over): Not on file   Intimate Partner Violence: Not on file   Housing Stability: Low Risk  (11/8/2024)    Housing Stability Vital Sign     Unable to Pay for Housing in the Last Year: No     Number of Times Moved in the Last Year: 1     Homeless in the Last Year: No       Current Outpatient Medications:     acetaminophen-codeine (TYLENOL with CODEINE #3) 300-30 MG per tablet, Take 1 tablet by mouth every 6 (six) hours as needed for moderate pain, Disp: 20 tablet, Rfl: 0    aspirin (ECOTRIN LOW STRENGTH) 81 mg EC tablet, Take 162 mg by mouth daily, Disp: , Rfl:     buprenorphine-naloxone (SUBOXONE) 8-2 mg per SL tablet, place 1 tablet under the tongue and ALLOW TO dissolve once daily, Disp: , Rfl:     furosemide (LASIX) 20 mg tablet, take 1 tablet by mouth once daily, Disp: 90 tablet, Rfl: 5    nystatin (MYCOSTATIN) cream, Apply topically 2 (two) times a day for 10 days, Disp: 30 g, Rfl: 0    potassium chloride (Klor-Con M20) 20 mEq tablet, take 1 tablet by mouth once daily, Disp: 90 tablet, Rfl: 3    QUEtiapine (SEROquel) 25 mg tablet, take 1 tablet by mouth at bedtime, Disp: 90 tablet, Rfl: 2    Review of Systems   Constitutional:  Negative for appetite change, chills, fatigue, fever and unexpected weight change.   HENT:  Negative for congestion, hearing loss  and postnasal drip.    Respiratory:  Negative for cough and shortness of breath.    Cardiovascular:  Positive for leg swelling.   Skin:  Positive for wound (R medial ankle). Negative for rash.   Neurological:  Negative for numbness.   Hematological:  Does not bruise/bleed easily.   Psychiatric/Behavioral: Negative.           Objective:  BP (!) 174/86 Comment: patient states she was very flustered this morning  Pulse 98   Temp 98.1 °F (36.7 °C)   Resp 20   LMP  (LMP Unknown)   Pain Score: 0-No pain     Physical Exam  Vitals reviewed.   Constitutional:       General: She is not in acute distress.     Appearance: Normal appearance. She is well-developed. She is obese.   HENT:      Head: Normocephalic and atraumatic.   Cardiovascular:      Rate and Rhythm: Normal rate.      Pulses:           Dorsalis pedis pulses are 2+ on the right side.        Posterior tibial pulses are 2+ on the right side.   Pulmonary:      Effort: Pulmonary effort is normal.   Musculoskeletal:         General: No deformity.      Right lower leg: Edema present.      Left lower leg: Edema present.        Feet:    Feet:      Comments: Pink granulated wound bed with scant serosanguinous drainage, see wound assessment.  Skin:     General: Skin is warm and dry.      Findings: Wound (R medial ankle) present.   Neurological:      General: No focal deficit present.      Mental Status: She is alert and oriented to person, place, and time.   Psychiatric:         Mood and Affect: Mood and affect normal.         Behavior: Behavior normal. Behavior is cooperative.         Wound 12/18/24 Surgical Ankle Anterior;Right (Active)   Wound Image Images linked 03/31/25 0933   Wound Description Pink 03/31/25 0933   Non-staged Wound Description Full thickness 03/31/25 0933   Wound Length (cm) 0.1 cm 03/31/25 0933   Wound Width (cm) 0.1 cm 03/31/25 0933   Wound Depth (cm) 0.1 cm 03/31/25 0933   Wound Surface Area (cm^2) 0.01 cm^2 03/31/25 0933   Wound Volume (cm^3)  "0.001 cm^3 03/31/25 0933   Calculated Wound Volume (cm^3) 0 cm^3 03/31/25 0933   Change in Wound Size % 100 03/31/25 0933   Drainage Amount Scant 03/31/25 0933   Drainage Description Serosanguineous 03/31/25 0933   Vernell-wound Assessment Scar Tissue 03/31/25 0933   Dressing Status Intact 03/31/25 0933     Results from last 6 Months   Lab Units 11/08/24  1543   WOUND CULTURE  1+ Growth of Candida parapsilosis*  1+ Growth of Staphylococcus coagulase negative*         Wound Instructions:  Orders Placed This Encounter   Procedures    Wound cleansing and dressings Surgical Anterior;Right Ankle     RIGHT ANKLE WOUND     Change dressing 3 times a week.      You may remove the dressing and shower.   Do not leave wound open to air, apply new dressing immediately.     Cleanse the wound with wound cleanser or mild soap and water, rinse, pat dry.     Apply Woun'Dres to the open wound.   Cover with gauze.   Secure with rolled gauze and tape.     Change dressing EVERY OTHER DAY.     Standing Status:   Future     Expiration Date:   4/7/2025    Wound Procedure Treatment Surgical Anterior;Right Ankle     This order was created via procedure documentation       Carole Ruiz PA-C, [facility]      Portions of the record may have been created with voice recognition software. Occasional wrong word or \"sound alike\" substitutions may have occurred due to the inherent limitations of voice recognition software. Read the chart carefully and recognize, using context, where substitutions have occurred.    "

## 2025-03-31 NOTE — PATIENT INSTRUCTIONS
Orders Placed This Encounter   Procedures    Wound cleansing and dressings Surgical Anterior;Right Ankle     RIGHT ANKLE WOUND     Change dressing 3 times a week.      You may remove the dressing and shower.   Do not leave wound open to air, apply new dressing immediately.     Cleanse the wound with wound cleanser or mild soap and water, rinse, pat dry.     Apply Woun'Dres to the open wound.   Cover with gauze.   Secure with rolled gauze and tape.     Change dressing EVERY OTHER DAY.     Standing Status:   Future     Expiration Date:   4/7/2025

## 2025-04-01 ENCOUNTER — RESULTS FOLLOW-UP (OUTPATIENT)
Dept: FAMILY MEDICINE CLINIC | Facility: CLINIC | Age: 60
End: 2025-04-01

## 2025-04-01 LAB
ALBUMIN SERPL BCG-MCNC: 4.3 G/DL (ref 3.5–5)
ALP SERPL-CCNC: 57 U/L (ref 34–104)
ALT SERPL W P-5'-P-CCNC: 12 U/L (ref 7–52)
ANION GAP SERPL CALCULATED.3IONS-SCNC: 10 MMOL/L (ref 4–13)
AST SERPL W P-5'-P-CCNC: 16 U/L (ref 13–39)
BASOPHILS # BLD AUTO: 0.04 THOUSANDS/ÂΜL (ref 0–0.1)
BASOPHILS NFR BLD AUTO: 1 % (ref 0–1)
BILIRUB SERPL-MCNC: 0.33 MG/DL (ref 0.2–1)
BUN SERPL-MCNC: 10 MG/DL (ref 5–25)
CALCIUM SERPL-MCNC: 9.5 MG/DL (ref 8.4–10.2)
CHLORIDE SERPL-SCNC: 102 MMOL/L (ref 96–108)
CO2 SERPL-SCNC: 28 MMOL/L (ref 21–32)
CREAT SERPL-MCNC: 0.75 MG/DL (ref 0.6–1.3)
EOSINOPHIL # BLD AUTO: 0.12 THOUSAND/ÂΜL (ref 0–0.61)
EOSINOPHIL NFR BLD AUTO: 2 % (ref 0–6)
ERYTHROCYTE [DISTWIDTH] IN BLOOD BY AUTOMATED COUNT: 12.4 % (ref 11.6–15.1)
EST. AVERAGE GLUCOSE BLD GHB EST-MCNC: 137 MG/DL
GFR SERPL CREATININE-BSD FRML MDRD: 86 ML/MIN/1.73SQ M
GLUCOSE SERPL-MCNC: 96 MG/DL (ref 65–140)
HBA1C MFR BLD: 6.4 %
HCT VFR BLD AUTO: 43.4 % (ref 34.8–46.1)
HGB BLD-MCNC: 14 G/DL (ref 11.5–15.4)
IMM GRANULOCYTES # BLD AUTO: 0.03 THOUSAND/UL (ref 0–0.2)
IMM GRANULOCYTES NFR BLD AUTO: 0 % (ref 0–2)
LYMPHOCYTES # BLD AUTO: 2.54 THOUSANDS/ÂΜL (ref 0.6–4.47)
LYMPHOCYTES NFR BLD AUTO: 33 % (ref 14–44)
MCH RBC QN AUTO: 31.7 PG (ref 26.8–34.3)
MCHC RBC AUTO-ENTMCNC: 32.3 G/DL (ref 31.4–37.4)
MCV RBC AUTO: 98 FL (ref 82–98)
MONOCYTES # BLD AUTO: 0.64 THOUSAND/ÂΜL (ref 0.17–1.22)
MONOCYTES NFR BLD AUTO: 8 % (ref 4–12)
NEUTROPHILS # BLD AUTO: 4.24 THOUSANDS/ÂΜL (ref 1.85–7.62)
NEUTS SEG NFR BLD AUTO: 56 % (ref 43–75)
NRBC BLD AUTO-RTO: 0 /100 WBCS
PLATELET # BLD AUTO: 239 THOUSANDS/UL (ref 149–390)
PMV BLD AUTO: 10.8 FL (ref 8.9–12.7)
POTASSIUM SERPL-SCNC: 4.2 MMOL/L (ref 3.5–5.3)
PROT SERPL-MCNC: 7.2 G/DL (ref 6.4–8.4)
RBC # BLD AUTO: 4.41 MILLION/UL (ref 3.81–5.12)
SODIUM SERPL-SCNC: 140 MMOL/L (ref 135–147)
WBC # BLD AUTO: 7.61 THOUSAND/UL (ref 4.31–10.16)

## 2025-04-11 NOTE — PRE-PROCEDURE INSTRUCTIONS
Pre-Surgery Instructions:   Medication Instructions    aspirin (ECOTRIN LOW STRENGTH) 81 mg EC tablet Stop taking 7 days prior to surgery.    buprenorphine-naloxone (SUBOXONE) 8-2 mg per SL tablet Take day of surgery.    furosemide (LASIX) 20 mg tablet Hold day of surgery.    potassium chloride (Klor-Con M20) 20 mEq tablet Hold day of surgery.    QUEtiapine (SEROquel) 25 mg tablet Take night before surgery    Medication instructions for day of surgery reviewed. Please take all instructed medications with only a sip of water.       You will receive a call one business day prior to surgery with an arrival time and hospital directions. If your surgery is scheduled on a Monday, the hospital will be calling you on the Friday prior to your surgery. If you have not heard from anyone by 8pm, please call the hospital supervisor through the hospital  at [phone]. ([place] [phone] or [place] [phone]).    Do not eat or drink anything after midnight the night before your surgery, including candy, mints, lifesavers, or chewing gum. Do not drink alcohol 24hrs before your surgery. Try not to smoke at least 24hrs before your surgery.       Follow the pre surgery showering instructions as listed in the “My Surgical Experience Booklet” or otherwise provided by your surgeon's office. Do not use a blade to shave the surgical area 1 week before surgery. It is okay to use a clean electric clippers up to 24 hours before surgery. Do not apply any lotions, creams, including makeup, cologne, deodorant, or perfumes after showering on the day of your surgery. Do not use dry shampoo, hair spray, hair gel, or any type of hair products.     No contact lenses, eye make-up, or artificial eyelashes. Remove nail polish, including gel polish, and any artificial, gel, or acrylic nails if possible. Remove all jewelry including rings and body piercing jewelry.     Wear causal clothing that is easy to take on and off. Consider your  type of surgery.    Keep any valuables, jewelry, piercings at home. Please bring any specially ordered equipment (sling, braces) if indicated.    Arrange for a responsible person to drive you to and from the hospital on the day of your surgery. Please confirm the visitor policy for the day of your procedure when you receive your phone call with an arrival time.     Call the surgeon's office with any new illnesses, exposures, or additional questions prior to surgery.    Please reference your “My Surgical Experience Booklet” for additional information to prepare for your upcoming surgery.

## 2025-04-14 ENCOUNTER — OFFICE VISIT (OUTPATIENT)
Dept: WOUND CARE | Facility: CLINIC | Age: 60
End: 2025-04-14
Payer: COMMERCIAL

## 2025-04-14 VITALS
HEART RATE: 95 BPM | DIASTOLIC BLOOD PRESSURE: 76 MMHG | SYSTOLIC BLOOD PRESSURE: 157 MMHG | RESPIRATION RATE: 16 BRPM | TEMPERATURE: 97.8 F

## 2025-04-14 DIAGNOSIS — T81.89XA NON-HEALING SURGICAL WOUND, INITIAL ENCOUNTER: Primary | ICD-10-CM

## 2025-04-14 PROCEDURE — 99213 OFFICE O/P EST LOW 20 MIN: CPT | Performed by: ORTHOPAEDIC SURGERY

## 2025-04-14 NOTE — PATIENT INSTRUCTIONS
Orders Placed This Encounter   Procedures    Wound cleansing and dressings Surgical Anterior;Right Ankle     RIGHT ANKLE WOUND     Change dressing 3 times a week.      You may remove the dressing and shower.   Do not leave wound open to air, apply new dressing immediately.     Cleanse the wound with wound cleanser or mild soap and water, rinse, pat dry.     Cover with gauze.   Secure with rolled gauze and tape.     Change dressing EVERY OTHER DAY.     Standing Status:   Future     Expiration Date:   4/21/2025

## 2025-04-14 NOTE — PROGRESS NOTES
Patient ID: Emma Brock is a 59 y.o. female Date of Birth 1965       Chief Complaint   Patient presents with    Follow Up Wound Care Visit     R ankle wound       Allergies:  Morphine, Prednisone, and Trazodone    Diagnosis:   Diagnosis ICD-10-CM Associated Orders   1. Non-healing surgical wound, initial encounter  T81.89XA Wound cleansing and dressings Surgical Anterior;Right Ankle     Wound Procedure Treatment Surgical Anterior;Right Ankle           Assessment and Plan :  Follow up evaluation of nonhealing surgical wound on Right ankle s/p ORIF is almost healed with scant drainage   Continue wound management DSD, see wound orders below.  Continue compression with compression stockings, applied Spandagrip in office today.  No harsh cleansers such as alcohol, peroxide, or antibacterial soap, do not submerge in water such as bathtub, hot tub, swimming pool, ocean, etc.   Can cleanse with mild soap and water or NSS at dressing changes.   Continue frequent elevation of leg and increase exercise/walking for wound healing.  Counseled on smoking cessation.  Continue adequate protein intake (chicken, fish, yogurt, eggs and nuts), 3-4 servings per day.   Follow up with vascular studies and vascular surgeon as ordered.   Follow-up in 2 week(s) or call sooner with questions or concerns or any signs of infection such as redness, swelling, increased/purulent drainage, fever, chills, increased severe pain.     Subjective:   2/12: Patient is a 59 y.o. female with pmhx HTN, HLD, Tobacco Abuse (0.5 ppd), chronic back pain, h/o Tonsillar cancer, Obesity, Continuous opioid dependence, h/o RLE DVT, Heart murmur, Bilateral lower extremity edema and insomnia who return to [facility] for follow up evaluation of nonhealing surgical wound on R medial ankle which has been present since 6/21/21 s/p ORIF of bimalleolar ankle. Pt was last seen at clinic on 12/30 and was unable to continue follow ups due to lack of insurance. She has been  applying silver alginate to wound bed.  No diabetes.  No ETOH or drug use. Pt denies any sob, fatigue, N/V, CP, fevers or chills.    2/19: Patient presents for followup evaluation of nonhealing surgical wound on Right ankle s/p ORIF.  No new complaints. No increased pain or drainage.  Has been using Exufiber Ag on the wound bed.  Pt denies any fevers or chills.    3/17: Patient presents for followup evaluation of nonhealing surgical wound on Right ankle s/p ORIF. Pt states dressing has been adhering to wound bed.  Has been applying Purachol Ag on the wound bed.  Pt denies any fevers or chills.    3/31: Patient presents for followup evaluation of nonhealing surgical wound on Right ankle s/p ORIF. Pt admits to not elevating her leg as frequently as possible. Has been applying Wioun'dres as directed. Pt denies any fevers or chills.    4/14: Patient presents for followup evaluation of nonhealing surgical wound on Right ankle s/p ORIF. Doing well. Has been applying Wioun'dres as directed. Pt does not have compression on today. Pt denies any fevers or chills.        The following portions of the patient's history were reviewed and updated as appropriate:   Patient Active Problem List   Diagnosis    Back pain, chronic    Dyslipidemia, goal LDL below 130    Tonsillar cancer (HCC)    HTN, goal below 140/90    Obesity, morbid (HCC)    Continuous opioid dependence (HCC)    Heart murmur, systolic    Wound of right ankle    Smoking greater than 30 pack years    Bilateral lower extremity edema    Encounter for screening mammogram for malignant neoplasm of breast    Primary insomnia    Atypical ductal hyperplasia of right breast    Malignant neoplasm of upper-outer quadrant of right breast in female, estrogen receptor positive (HCC)     Past Medical History:   Diagnosis Date    Acute encephalopathy 06/26/2021    Anxiety 2/12/2025    Bimalleolar fracture of right ankle 06/11/2021    Breast cyst 1980    Cancer (HCC)     Chronic back  pain     Fibrocystic breast     HTN (hypertension) 06/21/2021    Hypertension     Migraine     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    Non Hodgkin's lymphoma (HCC)     Obesity     Right leg DVT (HCC) 09/19/2013     Past Surgical History:   Procedure Laterality Date    BREAST BIOPSY  1980    [place], [place]    BREAST CYST EXCISION Right     40 years ago    BREAST LUMPECTOMY  1980    HYSTERECTOMY      LYMPH NODE BIOPSY      [place] OPEN TREATMENT BIMALLEOLAR ANKLE FRACTURE Right 06/21/2021    Procedure: OPEN REDUCTION W/ INTERNAL FIXATION (ORIF) RIGHT ANKLE BIMALLEOLAR FRACTURE;  Surgeon: Joe Bill MD;  Location: [facility];  Service: Orthopedics    TONSILLECTOMY      US BREAST CLIP NEEDLE LOC RIGHT Right 3/28/2025    US GUIDANCE BREAST BIOPSY RIGHT EACH ADDITIONAL Right 02/22/2025    US GUIDANCE BREAST BIOPSY RIGHT EACH ADDITIONAL Right 02/22/2025    US GUIDANCE BREAST BIOPSY RIGHT EACH ADDITIONAL Right 02/22/2025    US GUIDED BREAST BIOPSY RIGHT COMPLETE Right 02/22/2025     Family History   Problem Relation Age of Onset    Lung cancer Mother     COPD Mother     Skin cancer Father     Hypertension Father     Alcohol abuse Father     Depression Father     Diabetes Father     Breast cancer Maternal Aunt     Stomach cancer Maternal Grandmother     Cancer Maternal Grandmother     Completed Suicide  Maternal Grandfather     Ovarian cancer Neg Hx      Social History     Socioeconomic History    Marital status: /Civil Union     Spouse name: Not on file    Number of children: Not on file    Years of education: Not on file    Highest education level: Not on file   Occupational History    Not on file   Tobacco Use    Smoking status: Every Day     Current packs/day: 0.25     Types: Cigarettes    Smokeless tobacco: Never   Vaping Use    Vaping status: Never Used   Substance and Sexual Activity    Alcohol use: Not Currently    Drug use: Not Currently    Sexual activity: Not Currently     Partners: Male     Birth  control/protection: Female Sterilization   Other Topics Concern    Not on file   Social History Narrative    Not on file     Social Drivers of Health     Financial Resource Strain: Not on file   Food Insecurity: Patient Declined (11/8/2024)    Hunger Vital Sign     Worried About Running Out of Food in the Last Year: Patient declined     Ran Out of Food in the Last Year: Patient declined   Transportation Needs: No Transportation Needs (11/8/2024)    PRAPARE - Transportation     Lack of Transportation (Medical): No     Lack of Transportation (Non-Medical): No   Physical Activity: Not on file   Stress: Not on file   Social Connections: Unknown (6/18/2024)    Received from [facility]     How often do you feel lonely or isolated from those around you? (Adult - for ages 18 years and over): Not on file   Intimate Partner Violence: Not on file   Housing Stability: Low Risk  (11/8/2024)    Housing Stability Vital Sign     Unable to Pay for Housing in the Last Year: No     Number of Times Moved in the Last Year: 1     Homeless in the Last Year: No       Current Outpatient Medications:     acetaminophen-codeine (TYLENOL with CODEINE #3) 300-30 MG per tablet, Take 1 tablet by mouth every 6 (six) hours as needed for moderate pain, Disp: 20 tablet, Rfl: 0    aspirin (ECOTRIN LOW STRENGTH) 81 mg EC tablet, Take 81 mg by mouth daily, Disp: , Rfl:     buprenorphine-naloxone (SUBOXONE) 8-2 mg per SL tablet, place 1 tablet under the tongue and ALLOW TO dissolve once daily, Disp: , Rfl:     furosemide (LASIX) 20 mg tablet, take 1 tablet by mouth once daily, Disp: 90 tablet, Rfl: 5    potassium chloride (Klor-Con M20) 20 mEq tablet, take 1 tablet by mouth once daily, Disp: 90 tablet, Rfl: 3    QUEtiapine (SEROquel) 25 mg tablet, take 1 tablet by mouth at bedtime, Disp: 90 tablet, Rfl: 2    Review of Systems   Constitutional:  Negative for appetite change, chills, fatigue, fever and unexpected weight change.   HENT:   Negative for congestion, hearing loss and postnasal drip.    Respiratory:  Negative for cough and shortness of breath.    Cardiovascular:  Positive for leg swelling.   Skin:  Positive for wound (R medial ankle). Negative for rash.   Neurological:  Negative for numbness.   Hematological:  Does not bruise/bleed easily.   Psychiatric/Behavioral: Negative.           Objective:  /76   Pulse 95   Temp 97.8 °F (36.6 °C)   Resp 16   LMP  (LMP Unknown)   Pain Score: 0-No pain     Physical Exam  Vitals reviewed.   Constitutional:       General: She is not in acute distress.     Appearance: Normal appearance. She is well-developed. She is obese.   HENT:      Head: Normocephalic and atraumatic.   Cardiovascular:      Rate and Rhythm: Normal rate.      Pulses:           Dorsalis pedis pulses are 2+ on the right side.        Posterior tibial pulses are 2+ on the right side.   Pulmonary:      Effort: Pulmonary effort is normal.   Musculoskeletal:         General: No deformity.      Right lower leg: Edema present.      Left lower leg: Edema present.        Feet:    Feet:      Comments: Pale pink granulated wound bed with scant serosanguinous drainage, see wound assessment.  Skin:     General: Skin is warm and dry.      Findings: Wound (R medial ankle) present.   Neurological:      General: No focal deficit present.      Mental Status: She is alert and oriented to person, place, and time.   Psychiatric:         Mood and Affect: Mood and affect normal.         Behavior: Behavior normal. Behavior is cooperative.           Wound 12/18/24 Surgical Ankle Anterior;Right (Active)   Wound Description Pink 04/14/25 0811   Non-staged Wound Description Full thickness 04/14/25 0811   Wound Length (cm) 0.1 cm 04/14/25 0811   Wound Width (cm) 0.1 cm 04/14/25 0811   Wound Depth (cm) 0.1 cm 04/14/25 0811   Wound Surface Area (cm^2) 0.01 cm^2 04/14/25 0811   Wound Volume (cm^3) 0.001 cm^3 04/14/25 0811   Calculated Wound Volume (cm^3) 0  "cm^3 04/14/25 0811   Change in Wound Size % 100 04/14/25 0811   Drainage Amount Scant 04/14/25 0811   Drainage Description Serosanguineous 04/14/25 0811   Vernell-wound Assessment Scar Tissue;Pink 04/14/25 0811   Dressing Status Intact 04/14/25 0811       Results from last 6 Months   Lab Units 11/08/24  1543   WOUND CULTURE  1+ Growth of Candida parapsilosis*  1+ Growth of Staphylococcus coagulase negative*         Wound Instructions:  Orders Placed This Encounter   Procedures    Wound cleansing and dressings Surgical Anterior;Right Ankle     RIGHT ANKLE WOUND     Change dressing 3 times a week.      You may remove the dressing and shower.   Do not leave wound open to air, apply new dressing immediately.     Cleanse the wound with wound cleanser or mild soap and water, rinse, pat dry.     Cover with gauze.   Secure with rolled gauze and tape.     Change dressing EVERY OTHER DAY.     Standing Status:   Future     Expiration Date:   4/21/2025    Wound Procedure Treatment Surgical Anterior;Right Ankle     This order was created via procedure documentation       Carole Ruiz PA-C, [facility]      Portions of the record may have been created with voice recognition software. Occasional wrong word or \"sound alike\" substitutions may have occurred due to the inherent limitations of voice recognition software. Read the chart carefully and recognize, using context, where substitutions have occurred.    "

## 2025-04-14 NOTE — PROGRESS NOTES
Wound Procedure Treatment Surgical Anterior;Right Ankle    Performed by: Mckayla Simental RN  Authorized by: Carole Ruiz PA-C  Associated wounds:   Wound 12/18/24 Surgical Ankle Anterior;Right    Wound cleansed with:  NSS   Applied secondary dressing:  Gauze   Dressing secured with:  Tape, Elastic tubular stocking and Size F

## 2025-04-22 ENCOUNTER — TELEPHONE (OUTPATIENT)
Age: 60
End: 2025-04-22

## 2025-04-22 ENCOUNTER — APPOINTMENT (OUTPATIENT)
Dept: RADIOLOGY | Facility: HOSPITAL | Age: 60
End: 2025-04-22
Payer: COMMERCIAL

## 2025-04-22 ENCOUNTER — ANESTHESIA (OUTPATIENT)
Dept: PERIOP | Facility: HOSPITAL | Age: 60
End: 2025-04-22
Payer: COMMERCIAL

## 2025-04-22 ENCOUNTER — ANESTHESIA EVENT (OUTPATIENT)
Dept: PERIOP | Facility: HOSPITAL | Age: 60
End: 2025-04-22
Payer: COMMERCIAL

## 2025-04-22 ENCOUNTER — HOSPITAL ENCOUNTER (OUTPATIENT)
Dept: NUCLEAR MEDICINE | Facility: HOSPITAL | Age: 60
Discharge: HOME | End: 2025-04-22
Attending: SURGERY
Payer: COMMERCIAL

## 2025-04-22 ENCOUNTER — HOSPITAL ENCOUNTER (OUTPATIENT)
Facility: HOSPITAL | Age: 60
Setting detail: OUTPATIENT SURGERY
Discharge: HOME | End: 2025-04-22
Attending: SURGERY | Admitting: SURGERY
Payer: COMMERCIAL

## 2025-04-22 VITALS
WEIGHT: 228.18 LBS | OXYGEN SATURATION: 94 % | HEART RATE: 90 BPM | SYSTOLIC BLOOD PRESSURE: 169 MMHG | HEIGHT: 67 IN | BODY MASS INDEX: 35.81 KG/M2 | DIASTOLIC BLOOD PRESSURE: 83 MMHG | RESPIRATION RATE: 20 BRPM | TEMPERATURE: 97.9 F

## 2025-04-22 DIAGNOSIS — Z17.0 MALIGNANT NEOPLASM OF UPPER-OUTER QUADRANT OF RIGHT BREAST IN FEMALE, ESTROGEN RECEPTOR POSITIVE (HCC): ICD-10-CM

## 2025-04-22 DIAGNOSIS — C50.411 MALIGNANT NEOPLASM OF UPPER-OUTER QUADRANT OF RIGHT BREAST IN FEMALE, ESTROGEN RECEPTOR POSITIVE (HCC): ICD-10-CM

## 2025-04-22 DIAGNOSIS — C50.411 MALIGNANT NEOPLASM OF UPPER-OUTER QUADRANT OF RIGHT BREAST IN FEMALE, ESTROGEN RECEPTOR POSITIVE (HCC): Primary | ICD-10-CM

## 2025-04-22 DIAGNOSIS — Z17.0 MALIGNANT NEOPLASM OF UPPER-OUTER QUADRANT OF RIGHT BREAST IN FEMALE, ESTROGEN RECEPTOR POSITIVE (HCC): Primary | ICD-10-CM

## 2025-04-22 PROCEDURE — A9541 TC99M SULFUR COLLOID: HCPCS

## 2025-04-22 PROCEDURE — 88304 TISSUE EXAM BY PATHOLOGIST: CPT | Performed by: STUDENT IN AN ORGANIZED HEALTH CARE EDUCATION/TRAINING PROGRAM

## 2025-04-22 PROCEDURE — 88307 TISSUE EXAM BY PATHOLOGIST: CPT | Performed by: STUDENT IN AN ORGANIZED HEALTH CARE EDUCATION/TRAINING PROGRAM

## 2025-04-22 PROCEDURE — 38792 RA TRACER ID OF SENTINL NODE: CPT | Performed by: SURGERY

## 2025-04-22 PROCEDURE — 38792 RA TRACER ID OF SENTINL NODE: CPT

## 2025-04-22 PROCEDURE — 19301 PARTIAL MASTECTOMY: CPT

## 2025-04-22 PROCEDURE — 38900 IO MAP OF SENT LYMPH NODE: CPT | Performed by: SURGERY

## 2025-04-22 PROCEDURE — 38525 BIOPSY/REMOVAL LYMPH NODES: CPT | Performed by: SURGERY

## 2025-04-22 PROCEDURE — 38525 BIOPSY/REMOVAL LYMPH NODES: CPT

## 2025-04-22 PROCEDURE — 38900 IO MAP OF SENT LYMPH NODE: CPT

## 2025-04-22 PROCEDURE — 88305 TISSUE EXAM BY PATHOLOGIST: CPT | Performed by: STUDENT IN AN ORGANIZED HEALTH CARE EDUCATION/TRAINING PROGRAM

## 2025-04-22 PROCEDURE — 19301 PARTIAL MASTECTOMY: CPT | Performed by: SURGERY

## 2025-04-22 RX ORDER — ACETAMINOPHEN AND CODEINE PHOSPHATE 300; 30 MG/1; MG/1
1 TABLET ORAL EVERY 6 HOURS PRN
Qty: 30 TABLET | Refills: 0 | Status: SHIPPED | OUTPATIENT
Start: 2025-04-22

## 2025-04-22 RX ORDER — HYDROMORPHONE HYDROCHLORIDE 2 MG/ML
INJECTION, SOLUTION INTRAMUSCULAR; INTRAVENOUS; SUBCUTANEOUS AS NEEDED
Status: DISCONTINUED | OUTPATIENT
Start: 2025-04-22 | End: 2025-04-22

## 2025-04-22 RX ORDER — PROPOFOL 10 MG/ML
INJECTION, EMULSION INTRAVENOUS CONTINUOUS PRN
Status: DISCONTINUED | OUTPATIENT
Start: 2025-04-22 | End: 2025-04-22

## 2025-04-22 RX ORDER — HYDROMORPHONE HCL/PF 1 MG/ML
0.5 SYRINGE (ML) INJECTION
Status: DISCONTINUED | OUTPATIENT
Start: 2025-04-22 | End: 2025-04-22 | Stop reason: HOSPADM

## 2025-04-22 RX ORDER — PROPOFOL 10 MG/ML
INJECTION, EMULSION INTRAVENOUS AS NEEDED
Status: DISCONTINUED | OUTPATIENT
Start: 2025-04-22 | End: 2025-04-22

## 2025-04-22 RX ORDER — ACETAMINOPHEN 10 MG/ML
INJECTION, SOLUTION INTRAVENOUS AS NEEDED
Status: DISCONTINUED | OUTPATIENT
Start: 2025-04-22 | End: 2025-04-22

## 2025-04-22 RX ORDER — KETAMINE HCL IN NACL, ISO-OSM 100MG/10ML
SYRINGE (ML) INJECTION AS NEEDED
Status: DISCONTINUED | OUTPATIENT
Start: 2025-04-22 | End: 2025-04-22

## 2025-04-22 RX ORDER — ONDANSETRON 2 MG/ML
4 INJECTION INTRAMUSCULAR; INTRAVENOUS ONCE AS NEEDED
Status: DISCONTINUED | OUTPATIENT
Start: 2025-04-22 | End: 2025-04-22 | Stop reason: HOSPADM

## 2025-04-22 RX ORDER — FENTANYL CITRATE 50 UG/ML
INJECTION, SOLUTION INTRAMUSCULAR; INTRAVENOUS AS NEEDED
Status: DISCONTINUED | OUTPATIENT
Start: 2025-04-22 | End: 2025-04-22

## 2025-04-22 RX ORDER — OXYCODONE AND ACETAMINOPHEN 5; 325 MG/1; MG/1
1 TABLET ORAL EVERY 4 HOURS PRN
Status: DISCONTINUED | OUTPATIENT
Start: 2025-04-22 | End: 2025-04-22 | Stop reason: HOSPADM

## 2025-04-22 RX ORDER — MIDAZOLAM HYDROCHLORIDE 2 MG/2ML
INJECTION, SOLUTION INTRAMUSCULAR; INTRAVENOUS AS NEEDED
Status: DISCONTINUED | OUTPATIENT
Start: 2025-04-22 | End: 2025-04-22

## 2025-04-22 RX ORDER — LIDOCAINE HYDROCHLORIDE 10 MG/ML
INJECTION, SOLUTION EPIDURAL; INFILTRATION; INTRACAUDAL; PERINEURAL AS NEEDED
Status: DISCONTINUED | OUTPATIENT
Start: 2025-04-22 | End: 2025-04-22

## 2025-04-22 RX ORDER — FENTANYL CITRATE/PF 50 MCG/ML
25 SYRINGE (ML) INJECTION
Status: DISCONTINUED | OUTPATIENT
Start: 2025-04-22 | End: 2025-04-22 | Stop reason: HOSPADM

## 2025-04-22 RX ORDER — CEFAZOLIN SODIUM 2 G/50ML
2000 SOLUTION INTRAVENOUS ONCE
Status: COMPLETED | OUTPATIENT
Start: 2025-04-22 | End: 2025-04-22

## 2025-04-22 RX ORDER — PROMETHAZINE HYDROCHLORIDE 25 MG/ML
12.5 INJECTION, SOLUTION INTRAMUSCULAR; INTRAVENOUS ONCE AS NEEDED
Status: DISCONTINUED | OUTPATIENT
Start: 2025-04-22 | End: 2025-04-22 | Stop reason: HOSPADM

## 2025-04-22 RX ORDER — ISOSULFAN BLUE 50 MG/5ML
INJECTION, SOLUTION SUBCUTANEOUS AS NEEDED
Status: DISCONTINUED | OUTPATIENT
Start: 2025-04-22 | End: 2025-04-22 | Stop reason: HOSPADM

## 2025-04-22 RX ORDER — ONDANSETRON 2 MG/ML
INJECTION INTRAMUSCULAR; INTRAVENOUS AS NEEDED
Status: DISCONTINUED | OUTPATIENT
Start: 2025-04-22 | End: 2025-04-22

## 2025-04-22 RX ORDER — BUPIVACAINE HYDROCHLORIDE 2.5 MG/ML
INJECTION, SOLUTION EPIDURAL; INFILTRATION; INTRACAUDAL; PERINEURAL AS NEEDED
Status: DISCONTINUED | OUTPATIENT
Start: 2025-04-22 | End: 2025-04-22 | Stop reason: HOSPADM

## 2025-04-22 RX ORDER — SODIUM CHLORIDE, SODIUM LACTATE, POTASSIUM CHLORIDE, CALCIUM CHLORIDE 600; 310; 30; 20 MG/100ML; MG/100ML; MG/100ML; MG/100ML
INJECTION, SOLUTION INTRAVENOUS CONTINUOUS PRN
Status: DISCONTINUED | OUTPATIENT
Start: 2025-04-22 | End: 2025-04-22

## 2025-04-22 RX ADMIN — ACETAMINOPHEN 1000 MG: 10 INJECTION INTRAVENOUS at 10:54

## 2025-04-22 RX ADMIN — OXYCODONE HYDROCHLORIDE AND ACETAMINOPHEN 1 TABLET: 5; 325 TABLET ORAL at 12:50

## 2025-04-22 RX ADMIN — FENTANYL CITRATE 25 MCG: 50 INJECTION, SOLUTION INTRAMUSCULAR; INTRAVENOUS at 10:08

## 2025-04-22 RX ADMIN — HYDROMORPHONE HYDROCHLORIDE 0.5 MG: 2 INJECTION, SOLUTION INTRAMUSCULAR; INTRAVENOUS; SUBCUTANEOUS at 11:02

## 2025-04-22 RX ADMIN — PROPOFOL 200 MG: 10 INJECTION, EMULSION INTRAVENOUS at 10:08

## 2025-04-22 RX ADMIN — MIDAZOLAM HYDROCHLORIDE 2 MG: 1 INJECTION, SOLUTION INTRAMUSCULAR; INTRAVENOUS at 10:05

## 2025-04-22 RX ADMIN — Medication 20 MG: at 10:50

## 2025-04-22 RX ADMIN — SODIUM CHLORIDE, SODIUM LACTATE, POTASSIUM CHLORIDE, AND CALCIUM CHLORIDE: .6; .31; .03; .02 INJECTION, SOLUTION INTRAVENOUS at 10:06

## 2025-04-22 RX ADMIN — Medication 30 MG: at 10:35

## 2025-04-22 RX ADMIN — DEXMEDETOMIDINE HYDROCHLORIDE 4 MCG: 100 INJECTION, SOLUTION, CONCENTRATE INTRAVENOUS at 10:21

## 2025-04-22 RX ADMIN — FENTANYL CITRATE 75 MCG: 50 INJECTION, SOLUTION INTRAMUSCULAR; INTRAVENOUS at 10:36

## 2025-04-22 RX ADMIN — FENTANYL CITRATE 25 MCG: 50 INJECTION INTRAMUSCULAR; INTRAVENOUS at 12:00

## 2025-04-22 RX ADMIN — CEFAZOLIN SODIUM 2000 MG: 2 SOLUTION INTRAVENOUS at 10:14

## 2025-04-22 RX ADMIN — DEXMEDETOMIDINE HYDROCHLORIDE 8 MCG: 100 INJECTION, SOLUTION, CONCENTRATE INTRAVENOUS at 10:15

## 2025-04-22 RX ADMIN — LIDOCAINE HYDROCHLORIDE 50 MG: 10 INJECTION, SOLUTION EPIDURAL; INFILTRATION; INTRACAUDAL; PERINEURAL at 10:08

## 2025-04-22 RX ADMIN — PROPOFOL 50 MG: 10 INJECTION, EMULSION INTRAVENOUS at 10:11

## 2025-04-22 RX ADMIN — DEXMEDETOMIDINE HYDROCHLORIDE 8 MCG: 100 INJECTION, SOLUTION, CONCENTRATE INTRAVENOUS at 11:06

## 2025-04-22 RX ADMIN — PROPOFOL 70 MCG/KG/MIN: 10 INJECTION, EMULSION INTRAVENOUS at 10:08

## 2025-04-22 RX ADMIN — PROPOFOL 60 MG: 10 INJECTION, EMULSION INTRAVENOUS at 11:23

## 2025-04-22 RX ADMIN — ONDANSETRON 4 MG: 2 INJECTION INTRAMUSCULAR; INTRAVENOUS at 11:20

## 2025-04-22 NOTE — INTERVAL H&P NOTE
H&P reviewed. After examining the patient I find no changes in the patients condition since the H&P had been written.    Vitals:    04/22/25 0825   Pulse: (!) 106   Resp: 18   Temp: 97.5 °F (36.4 °C)   SpO2: 98%

## 2025-04-22 NOTE — TELEPHONE ENCOUNTER
Spoke with [facility] pharmacist who received script for Tylenol with Codeine #3 and wanted to confirm that Dr. Rivera is aware patient is on Suboxone prior to filling. I let him know we will confirm with provider and call pharmacy back.    Best callback number for [facility]: [phone], ask for pharmacist

## 2025-04-22 NOTE — ANESTHESIA POSTPROCEDURE EVALUATION
Post-Op Assessment Note    CV Status:  Stable  Pain Score: 2    Pain management: adequate       Mental Status:  Alert and awake   Hydration Status:  Euvolemic   PONV Controlled:  Controlled   Airway Patency:  Patent     Post Op Vitals Reviewed: Yes    No anethesia notable event occurred.    Staff: CRNA           Last Filed PACU Vitals:  Vitals Value Taken Time   Temp 97.3 °F (36.3 °C) 04/22/25 1145   Pulse 70 04/22/25 1146   /65 04/22/25 1145   Resp 14    SpO2 95 % 04/22/25 1146   Vitals shown include unfiled device data.

## 2025-04-22 NOTE — OP NOTE
OPERATIVE REPORT  PATIENT NAME: Emma Brock    :  1965  MRN: [id number]  Pt Location: [facility] OR ROOM 01    SURGERY DATE: 2025    Surgeons and Role:     * Luis Rivera MD - Primary     * Major Estrada PA-C    Preop Diagnosis:  Malignant neoplasm of upper-outer quadrant of right breast in female, estrogen receptor positive (HCC) [C50.411, Z17.0]    Post-Op Diagnosis Codes:     * Malignant neoplasm of upper-outer quadrant of right breast in female, estrogen receptor positive (HCC) [C50.411, Z17.0]    Procedure(s):  Right - RIGHT BREAST EDINSON  BRACKETED LUMPECTOMY  Right - LYMPHATIC MAPPING WITH BLUE AND RADIOACTIVE DYES. AXILLARY SENTINEL LYMPH NODE BIOPSY. INJECTION IN OR AT 1015 BY DR CARDARELLI    Specimen(s):  ID Type Source Tests Collected by Time Destination   1 : RIGHT axillary sentinel lymph node #1 hot and blue Tissue Lymph Node, [place] TISSUE EXAM Luis Rivera MD 2025 1038    2 : RIGHT axillary sentinel lymph node #2 hot and blue Tissue Lymph Node, [place] TISSUE EXAM Luis Rivera MD 2025 1041    3 : RIGHT breast edinson bracketted lumpectomy marked per margin protocol Tissue Breast, Right TISSUE EXAM Luis Rivera MD 2025 1048    4 : Additional ANTERIOR margin inked most distal margin GREEN Tissue Breast, Right TISSUE EXAM Luis Rivera MD 2025 1049    5 : Additional INFERIOR margin inked most distal margin BLUE Tissue Breast, Right TISSUE EXAM Luis Rivera MD 2025 1050    6 : Additional LATERAL margin inked most distal margin ORANGE Tissue Breast, Right TISSUE EXAM Luis Rivera MD 2025 1051    7 : Additional MEDIAL  margin inked most distal margin YELLOW Tissue Breast, Right TISSUE EXAM Luis Rivera MD 2025 1052    8 : Additional POSTERIOR  margin inked most distal margin BLACK Tissue Breast, Right TISSUE EXAM Luis Rivera MD  4/22/2025 1053    9 : Additional SUPERIOR  margin inked most distal margin RED Tissue Breast, Right TISSUE EXAM Luis Rivera MD 4/22/2025 1053        Estimated Blood Loss:   Minimal    Drains:  * No LDAs found *    Anesthesia Type:   General    Operative Indications:      Operative Findings:  Both Mevla clips and biopsy clips are in the specimen      Complications:   None    Procedure and Technique:    The prior post biopsy images were reviewed prior to the procedure.    Lumpectomy  The patient was brought to the operation room and placed under general anesthesia.  Attention was paid to ensure appropriate padding and correct positioning.  The MELVA  detector was then used to localize the MELVA deflector.  The breast skin was marked in this area.  The right breast was injected intradermally with technetium sulfur colloid in the jerrell-areolar region and 0.3cc of methylene blue was injected intradermally over the tumor.  These areas were vigorously massaged to facilitate identification of the sentinel lymph node (SLN).  The breast and axillary region were then prepped and draped in a sterile fashion.  I initiated a time-out, identifying the patient, the correct side and the above procedure.  All parties agreed with the time out.     The planned incision was then injected with 0.25% Marcaine for local anesthesia.  The incision was sharply incised encompassing the blue dye.  The MELVA detector was used to guide the dissection.  Superior, inferior, medial and lateral planes were developed around the MELVA deflector and the specimen truncated beyond the MELVA deflector.  The specimen was then inked for orientation purposes.  A specimen radiograph was taken in two dimensions. 6 Additional margins were removed to optimize complete removal of the tumor.  The additional margins were inked on the most distal area.  All specimens were sent to pathology for permanent analysis.  Superficial bleeding controlled with  electrocautery and the wound was extensively irrigated.  The wound was closed with two layers, an inner layer of 3-0 vicryl and a subcuticular layer of 4-0 monocryl.  Exofin  were applied.    SLN Biopsy  The previously marked location of the sentinel lymph node was injected with 0.25% Marcaine.  A curivilinear incision was made and dissection was taken down into the axillar proper with electrocautery.   The rosibel counter was used to help localize the SLN.  The sentinel lymph node was identified and removed with electrocautery.       SLN Findings  The SLN was blue and radioactive.  The lymph node was grossly normal and sent for permanent pathologic analysis.  This wound also was irrigated extensively: superficial bleeding was controlled with electrocautery and then closed in 2 layers - an inner layer of 3-0 Vicryl and a subcuticular layer of 4-0 Monocryl. Exofin were applied. Sponge and instrument counts correct x 2.   I was present for the entire procedure. and A physician assistant was required during the procedure for retraction, tissue handling, dissection and suturing.    Patient Disposition:  PACU     [place] Node Biopsy for Breast Cancer - Right  Operation performed with curative intent. Yes   Tracer(s) used to identify sentinel nodes in the upfront surgery (non-neoadjuvant) setting (select all that apply). Dye and Radioactive tracer   Tracer(s) used to identify sentinel nodes in the neoadjuvant setting (select all that apply). N/A   All nodes (colored or non-colored) present at the end of a dye-filled lymphatic channel were removed. Yes   All significantly radioactive nodes were removed. Yes   All palpably suspicious nodes were removed. Yes   Biopsy-proven positive nodes marked with clips prior to chemotherapy were identified and removed. N/A                 SIGNATURE: Luis Rivera MD  DATE: April 22, 2025  TIME: 11:20 AM

## 2025-04-22 NOTE — TELEPHONE ENCOUNTER
Spoke with [facility] pharmacist to make him aware that Dr. Rivera is aware patient is currently taking suboxone and Tylenol with codeine can be filled. Pharmacist appreciative of call.

## 2025-04-22 NOTE — ANESTHESIA PREPROCEDURE EVALUATION
Procedure:  RIGHT BREAST MELVA  BRACKETED LUMPECTOMY (Right: Breast)  LYMPHATIC MAPPING WITH BLUE AND RADIOACTIVE DYES, AXILLARY SENTINEL LYMPH NODE BIOPSY, POSSIBLE AXILLARY DISSECTION, INJECTION IN OR AT 1015 BY DR CARDARELLI (Right: Axilla)    Relevant Problems   CARDIO   (+) Dyslipidemia, goal LDL below 130   (+) HTN, goal below 140/90   (+) Heart murmur, systolic      GYN   (+) Malignant neoplasm of upper-outer quadrant of right breast in female, estrogen receptor positive (HCC)      MUSCULOSKELETAL   (+) Back pain, chronic      NEURO/PSYCH   (+) Back pain, chronic   (+) Continuous opioid dependence (HCC)      Other   (+) Tonsillar cancer (HCC)      TTE 2024:  Left Ventricle: Left ventricular cavity size is normal. Wall thickness is normal. The left ventricular ejection fraction is 60%. Systolic function is normal. Wall motion is normal. Diastolic function is normal.    Left Atrium: The atrium is mildly dilated.    Mitral Valve: There is mild annular calcification. There is mild regurgitation.    Tricuspid Valve: There is mild regurgitation    Physical Exam    Airway    Mallampati score: I  TM Distance: >3 FB  Neck ROM: full     Dental    upper dentures,     Cardiovascular  Cardiovascular exam normal    Pulmonary  Pulmonary exam normal     Other Findings  post-pubertal.      Anesthesia Plan  ASA Score- 2     Anesthesia Type- general with ASA Monitors.         Additional Monitors:     Airway Plan: LMA.           Plan Factors-Exercise tolerance (METS): >4 METS.            Patient is a current smoker.              Induction- intravenous.    Postoperative Plan-         Informed Consent- Anesthetic plan and risks discussed with patient.  I personally reviewed this patient with the CRNA. Discussed and agreed on the Anesthesia Plan with the CRNA..      NPO Status:  Vitals Value Taken Time   Date of last liquid 04/22/25 04/22/25 0826   Time of last liquid 0600 04/22/25 0826   Date of last solid 04/21/25 04/22/25  0826   Time of last solid 1800 04/22/25 0826

## 2025-04-25 ENCOUNTER — TELEPHONE (OUTPATIENT)
Age: 60
End: 2025-04-25

## 2025-04-25 NOTE — ANESTHESIA POSTPROCEDURE EVALUATION
Post-Op Assessment Note    Last Filed PACU Vitals:  Vitals Value Taken Time   Temp 97.3 °F (36.3 °C) 04/22/25 1145   Pulse 70 04/22/25 1230   /71 04/22/25 1230   Resp 21 04/22/25 1230   SpO2 93 % 04/22/25 1230       Modified Natividad:     Vitals Value Taken Time   Activity 2 04/22/25 1230   Respiration 2 04/22/25 1230   Circulation 2 04/22/25 1230   Consciousness 2 04/22/25 1230   Oxygen Saturation 2 04/22/25 1230     Modified Natividad Score: 10

## 2025-04-25 NOTE — TELEPHONE ENCOUNTER
Attempted to call patient for post op follow up. I was unable to speak with her, but left a message with the [facility] number for her to call at her convenience.

## 2025-04-28 PROCEDURE — 88305 TISSUE EXAM BY PATHOLOGIST: CPT | Performed by: STUDENT IN AN ORGANIZED HEALTH CARE EDUCATION/TRAINING PROGRAM

## 2025-04-28 PROCEDURE — 88307 TISSUE EXAM BY PATHOLOGIST: CPT | Performed by: STUDENT IN AN ORGANIZED HEALTH CARE EDUCATION/TRAINING PROGRAM

## 2025-04-28 PROCEDURE — 88304 TISSUE EXAM BY PATHOLOGIST: CPT | Performed by: STUDENT IN AN ORGANIZED HEALTH CARE EDUCATION/TRAINING PROGRAM

## 2025-04-28 NOTE — TELEPHONE ENCOUNTER
How does the incision look? WNL    Do you have fever or chills? No    Are you having any pain? Yes     Is it controlled with your pain medication? Yes    What medications are you currently taking for pain control? Ibuprofen    Do you have a drain(s)? No    Verify post-op appointment date and time  [x]    Do you have any other questions or concerns? No, denies.    **NOTE TO TRIAGER: If patient requires further triage, based upon the answers above, move to appropriate triage protocol.      Called and spoke with patient's , Len.  Len stated that patient was sleeping.  Len stated that incision looks WNL and she is taking Ibuprofen for pain.  Confirmed that patient has number to call, should she need to.

## 2025-05-09 PROBLEM — C50.911 INFILTRATING DUCTAL CARCINOMA OF RIGHT FEMALE BREAST (HCC): Status: ACTIVE | Noted: 2025-05-09

## 2025-05-09 PROBLEM — Z85.3 ENCOUNTER FOR FOLLOW-UP SURVEILLANCE OF BREAST CANCER: Status: ACTIVE | Noted: 2025-05-09

## 2025-05-09 PROBLEM — Z08 ENCOUNTER FOR FOLLOW-UP SURVEILLANCE OF BREAST CANCER: Status: ACTIVE | Noted: 2025-05-09

## 2025-05-09 PROBLEM — Z98.890 S/P LUMPECTOMY OF BREAST: Status: ACTIVE | Noted: 2025-05-09

## 2025-05-14 ENCOUNTER — OFFICE VISIT (OUTPATIENT)
Dept: SURGICAL ONCOLOGY | Facility: CLINIC | Age: 60
End: 2025-05-14
Payer: COMMERCIAL

## 2025-05-14 ENCOUNTER — DOCUMENTATION (OUTPATIENT)
Dept: HEMATOLOGY ONCOLOGY | Facility: CLINIC | Age: 60
End: 2025-05-14

## 2025-05-14 VITALS
BODY MASS INDEX: 35.79 KG/M2 | TEMPERATURE: 97.4 F | WEIGHT: 228 LBS | HEART RATE: 94 BPM | OXYGEN SATURATION: 92 % | DIASTOLIC BLOOD PRESSURE: 62 MMHG | HEIGHT: 67 IN | SYSTOLIC BLOOD PRESSURE: 122 MMHG

## 2025-05-14 DIAGNOSIS — C50.411 MALIGNANT NEOPLASM OF UPPER-OUTER QUADRANT OF RIGHT BREAST IN FEMALE, ESTROGEN RECEPTOR POSITIVE (HCC): ICD-10-CM

## 2025-05-14 DIAGNOSIS — Z08 ENCOUNTER FOR FOLLOW-UP SURVEILLANCE OF BREAST CANCER: Primary | ICD-10-CM

## 2025-05-14 DIAGNOSIS — C50.911 INFILTRATING DUCTAL CARCINOMA OF RIGHT FEMALE BREAST (HCC): ICD-10-CM

## 2025-05-14 DIAGNOSIS — Z17.0 MALIGNANT NEOPLASM OF UPPER-OUTER QUADRANT OF RIGHT BREAST IN FEMALE, ESTROGEN RECEPTOR POSITIVE (HCC): ICD-10-CM

## 2025-05-14 DIAGNOSIS — Z85.3 ENCOUNTER FOR FOLLOW-UP SURVEILLANCE OF BREAST CANCER: Primary | ICD-10-CM

## 2025-05-14 DIAGNOSIS — Z98.890 S/P LUMPECTOMY OF BREAST: ICD-10-CM

## 2025-05-14 PROCEDURE — 99213 OFFICE O/P EST LOW 20 MIN: CPT | Performed by: SURGERY

## 2025-05-14 NOTE — PROGRESS NOTES
Name: Emma Brock      : 1965      MRN: [id number]  Encounter Provider: Luis Rivera MD  Encounter Date: 2025   Encounter department: CANCER CARE [facility] SURGICAL ONCOLOGY [place]  :  Assessment & Plan  Encounter for follow-up surveillance of breast cancer         Malignant neoplasm of upper-outer quadrant of right breast in female, estrogen receptor positive (HCC)    Orders:    Ambulatory referral to PT/OT lymphedema therapy; Future    Ambulatory referral to Radiation Oncology; Future    Ambulatory Referral to Hematology / Oncology; Future    Infiltrating ductal carcinoma of right female breast (HCC)         S/P lumpectomy of breast         59-year-old female follow-up with right breast cancer s/p stage Ia s/p right breast partial mastectomy and sentinel lymph node biopsy.  She is here for follow-up.  She is overall doing well.  Pathology was reviewed and discussed extensively.  Further workup and management were also reviewed and discussed.  MammaPrint report was also reviewed and discussed and copy of the reports were given to the patient.  Further management of post surgery were reviewed and discussed.  Will refer her to medical and radiation oncologist for adjuvant radiation and possible endocrine therapy.  I will see her in 3 months.  She will also be referred to PT OT for lymphedema prevention.  All patient's and family members questions answered to their satisfaction        History of Present Illness   Emma Brock is a 59 y.o. year old female who presents for follow-up with stage Ia right breast cancer.     Oncology History   Cancer Staging   Infiltrating ductal carcinoma of right female breast (HCC)  Staging form: Breast, AJCC 8th Edition  - Clinical stage from 2025: Stage IA (cT1b, cN0, cM0, G2, ER+, ND+, HER2-) - Signed by Luis Rivera MD on 2025  Histopathologic type: Infiltrating duct carcinoma, NOS  Stage prefix: Initial  diagnosis  Nuclear grade: G3  Histologic grading system: 3 grade system  Laterality: Right  Lymph-vascular invasion (LVI): LVI not present (absent)/not identified  Specimen type: Excision  Oncology History   Malignant neoplasm of upper-outer quadrant of right breast in female, estrogen receptor positive (HCC)   2/22/2025 Initial Diagnosis    Malignant neoplasm of upper-outer quadrant of right breast in female, estrogen receptor positive (HCC)     2/22/2025 Biopsy    RIGHT breast ultrasound-guided  A.0900 o'clock, 6 cmfn cm from nipple (butterfly)  Adenosis and sclerosing adenosis    B. 1000 3 cmfn (hat)  Atypical Ductal Hyperplasia    C.1000 5cmfn (cylinder)  Adenosis and sclerosing adenosis,    D. 1000 6 cmfn (MELVA)  Invasive Ductal Hyperplasia with DCIS  Grade 2   WV 60 HER2 0    Concordant. Multifocal. Malignancy measures up to 8mm on US. ADH measures 4mm on US. Axillary US clear. Contralateral breast clear.  IDC and ADH span approximately 4.5 cm.      3/3/2025 Genetic Testing    Neoprospecta/RNA  VUS SDHA     3/10/2025 Genomic Testing    MammaPrint/BluePrint  LOW RISK   Luminal A  +0.020     4/22/2025 Surgery    RIGHT breast MELVA  lumpectomy with SLN biopsy (Dr. Rivera)  Invasive ductal carcinoma  Grade 2  7 mm  Margins NEG  0/1 lymph nodes      Infiltrating ductal carcinoma of right female breast (HCC)   2/22/2025 -  Cancer Staged    Staging form: Breast, AJCC 8th Edition  - Clinical stage from 2/22/2025: Stage IA (cT1b, cN0, cM0, G2, ER+, WV+, HER2-) - Signed by Luis Rivera MD on 5/14/2025  Histopathologic type: Infiltrating duct carcinoma, NOS  Stage prefix: Initial diagnosis  Nuclear grade: G3  Histologic grading system: 3 grade system  Laterality: Right  Lymph-vascular invasion (LVI): LVI not present (absent)/not identified  Specimen type: Excision       5/9/2025 Initial Diagnosis    Infiltrating ductal carcinoma of right female breast (HCC)        Review of Systems  "  Constitutional:  Negative for chills and fever.   HENT:  Negative for ear pain and sore throat.    Eyes:  Negative for pain and visual disturbance.   Respiratory:  Negative for cough and shortness of breath.    Cardiovascular:  Negative for chest pain and palpitations.   Gastrointestinal:  Negative for abdominal pain and vomiting.   Genitourinary:  Negative for dysuria and hematuria.   Musculoskeletal:  Negative for arthralgias and back pain.   Skin:  Negative for color change and rash.   Neurological:  Negative for seizures and syncope.   All other systems reviewed and are negative.   A complete review of systems is negative other than that noted above in the HPI.    Medical History Reviewed by provider this encounter:  Tobacco  Allergies  Meds  Problems  Med Hx  Surg Hx  Fam Hx     .       Objective   /62 (BP Location: Left arm, Patient Position: Sitting)   Pulse 94   Temp (!) 97.4 °F (36.3 °C) (Temporal)   Ht 5' 7\" (1.702 m)   Wt 103 kg (228 lb)   LMP  (LMP Unknown)   SpO2 92%   BMI 35.71 kg/m²     Pain Screening:     ECOG    Physical Exam  Constitutional:       Appearance: Normal appearance.   HENT:      Head: Normocephalic and atraumatic.      Nose: Nose normal.      Mouth/Throat:      Mouth: Mucous membranes are moist.     Eyes:      Pupils: Pupils are equal, round, and reactive to light.       Cardiovascular:      Rate and Rhythm: Normal rate.      Pulses: Normal pulses.      Heart sounds: Normal heart sounds.   Pulmonary:      Effort: Pulmonary effort is normal.      Breath sounds: Normal breath sounds.   Chest:        Comments: Right breast and right axillary surgical site healing well no evidence of seroma.  No evidence of surgical site infection.  Abdominal:      General: Bowel sounds are normal.      Palpations: Abdomen is soft.     Musculoskeletal:         General: Normal range of motion.      Cervical back: Normal range of motion and neck supple.     Skin:     General: Skin is " warm.     Neurological:      General: No focal deficit present.      Mental Status: She is alert and oriented to person, place, and time.     Psychiatric:         Mood and Affect: Mood normal.         Behavior: Behavior normal.         Thought Content: Thought content normal.         Judgment: Judgment normal.          Labs: I have reviewed pertinent labs.   Lab Results   Component Value Date/Time    WBC 7.61 03/31/2025 11:59 AM    RBC 4.41 03/31/2025 11:59 AM    Hemoglobin 14.0 03/31/2025 11:59 AM    Hematocrit 43.4 03/31/2025 11:59 AM    MCV 98 03/31/2025 11:59 AM    MCH 31.7 03/31/2025 11:59 AM    RDW 12.4 03/31/2025 11:59 AM    Platelets 239 03/31/2025 11:59 AM    Segmented % 56 03/31/2025 11:59 AM    Lymphocytes % 33 03/31/2025 11:59 AM    Monocytes % 8 03/31/2025 11:59 AM    Eosinophils Relative 2 03/31/2025 11:59 AM    Basophils Relative 1 03/31/2025 11:59 AM    Immature Grans % 0 03/31/2025 11:59 AM    Absolute Neutrophils 4.24 03/31/2025 11:59 AM      Lab Results   Component Value Date/Time    Sodium 140 03/31/2025 11:59 AM    Potassium 4.2 03/31/2025 11:59 AM    Chloride 102 03/31/2025 11:59 AM    CO2 28 03/31/2025 11:59 AM    ANION GAP 10 03/31/2025 11:59 AM    BUN 10 03/31/2025 11:59 AM    Creatinine 0.75 03/31/2025 11:59 AM    Glucose 96 03/31/2025 11:59 AM    Glucose, Fasting 120 (H) 12/13/2024 09:11 AM    Calcium 9.5 03/31/2025 11:59 AM    AST 16 03/31/2025 11:59 AM    ALT 12 03/31/2025 11:59 AM    Alkaline Phosphatase 57 03/31/2025 11:59 AM    Total Protein 7.2 03/31/2025 11:59 AM    Albumin 4.3 03/31/2025 11:59 AM    Total Bilirubin 0.33 03/31/2025 11:59 AM    eGFR 86 03/31/2025 11:59 AM      Admission on 04/22/2025, Discharged on 04/22/2025   Component Date Value Ref Range Status    Case Report 04/22/2025    Final                    Value:Surgical Pathology Report                         Case: [id number]                                  Authorizing Provider:  Luis Rivera,  Collected:            04/22/2025 1038                                     MD                                                                           Ordering Location:     [facility] Received:            04/22/2025 1157                                     Operating Room                                                               Pathologist:           Gio Hunt MD                                                            Specimens:   A) - Lymph Node, [place], RIGHT axillary sentinel lymph node #1 hot and blue                       B) - Lymph Node, [place], RIGHT axillary sentinel lymph node #2 hot and blue                       C) - Breast, Right, RIGHT breast edinson bracketted lumpectomy marked per margin                       protocol                                                                                                                      D) - Breast, Right, Additional ANTERIOR margin inked most distal margin GREEN                       E) - Breast, Right, Additional INFERIOR margin inked most distal margin BLUE                        F) - Breast, Right, Additional LATERAL margin inked most distal margin ORANGE                       G) - Breast, Right, Additional MEDIAL  margin inked most distal margin YELLOW                       H) - Breast, Right, Additional POSTERIOR  margin inked most distal margin BLACK                     I) - Breast, Right, Additional SUPERIOR  margin inked most distal margin RED               Final Diagnosis 04/22/2025    Final                    Value:A. [place] lymph node, right axilla, #1 hot and blue, excision:   - One lymph node, negative for metastatic carcinoma (0/1).     B. [place] lymph node, right axilla, #2 hot and blue, excision:   - Benign fibroadipose tissue; no lymph node is identified.     C. Breast, right, lumpectomy:   - Invasive ductal carcinoma.      - [place] Grade: 2 (3+2+1)      - Size: 7 mm      - All surgical margins are negative for  invasive carcinoma (with a clearance of >2 mm).   - Ductal carcinoma in situ (DCIS): present      - Nuclear Grade: 2-3 (intermediate to high), solid and micropapillary patterns with central necrosis      - DCIS constitutes ~30% of tumor mass and widely extends beyond invasive tumor borders.      - DCIS is focally present <1 mm from the inked posterior margin in this specimen; refer to parts D through I for final margin status.   - The non-neoplastic breast tissue shows extensive sclerosing adenosis, moderate usual ductal hyperplasia, and apocrine metaplasia.   - Scattered                           calcifications are seen in association with benign glands.    - Unremarkable skin.   - Biopsy site changes are present (x 4).    D. Breast, right, additional anterior margin, excision:   - Benign fibroadipose tissue.     E. Breast, right, additional inferior margin, excision:   - Benign, unremarkable breast tissue.     F. Breast, right, additional lateral margin, excision:   - Benign fibroadipose tissue.     G. Breast, right, additional medial margin, excision:   - Benign fibroadipose tissue.     H. Breast, right, additional posterior margin, excision:   - Benign fibroadipose tissue.      I. Breast, right, additional superior margin, excision:   - Benign, unremarkable breast tissue.         Note 04/22/2025    Final                    Value:If clinically indicated, the most appropriate tissue block(s) for ancillary testing are block(s):  C36      Additional Information 04/22/2025    Final                    Value:All reported additional testing was performed with appropriately reactive controls.  These tests were developed and their performance characteristics determined by [facility] or appropriate performing facility, though some tests may be performed on tissues which have not been validated for performance characteristics (such as staining performed on alcohol exposed cell blocks and decalcified  tissues).  Results should be interpreted with caution and in the context of the patients’ clinical condition. These tests may not be cleared or approved by the U.S. Food and Drug Administration, though the FDA has determined that such clearance or approval is not necessary. These tests are used for clinical purposes and they should not be regarded as investigational or for research. This laboratory has been approved by CLIA 88, designated as a high-complexity laboratory and is qualified to perform these tests.    Interpretation performed at [facility] [place] [address].      Synoptic Checklist 04/22/2025    Final                    Value:INVASIVE CARCINOMA OF THE BREAST: Resection                            INVASIVE CARCINOMA OF THE BREAST: RESECTION - All Specimens                            8th Edition - Protocol posted: 6/19/2024                                                        SPECIMEN                               Procedure:    Excision (less than total mastectomy)                                Specimen Laterality:    Right                                                         TUMOR                               Tumor Site:    Clock position                                :    10 o'clock                              Tumor Site:    Distance from nipple (Centimeters): 6 cm                             Histologic Type:    Invasive carcinoma of no special type (ductal)                              Histologic Grade (Jp Histologic Score):                                   Glandular (Acinar) / Tubular Differentiation:    Score 3                                Nuclear Pleomorphism:    Score 2                                Mitotic Rate:    Score 1                                Overall Grade:    Grade 2 (scores of 6 or 7)                              Tumor Size:    Greatest dimension of largest invasive focus (Millimeters): 7 mm                      "        Tumor Focality:    Single focus of invasive carcinoma                              Ductal Carcinoma In Situ (DCIS):    Present                                :    Positive for extensive intraductal component (EIC)                                Size (Extent) of DCIS:    Estimated size (extent) of DCIS is at least (Millimeters): 16 mm                               Architectural Patterns:    Micropapillary                                Architectural Patterns:    Solid                                Nuclear Grade:    Grade III (high)                                Necrosis:    Present, central (expansive \"comedo\" necrosis)                                Number of Blocks with DCIS:    10                                Number of Blocks Examined:    69                              Lymphatic and / or Vascular Invasion:    Not identified                              Microcalcifications:    Present in non-neoplastic tissue                              Treatment Effect in the Breast:    No known presurgical therapy                                                         MARGINS                             Margin Status for Invasive Carcinoma:    All margins negative for invasive carcinoma                                Distance from Invasive Carcinoma to Closest Margin:    Greater than: 2 mm                             Margin Status for DCIS:    All margins negative for DCIS                                Distance from DCIS to Closest Margin:    Greater than: 2 mm                                                        REGIONAL LYMPH NODES                             Regional Lymph Node Status:                                   :    All regional lymph nodes negative for tumor                                Total Number of Lymph Nodes Examined (sentinel and non-sentinel):    1                                Number of [place] Nodes Examined:    1                                                         pTNM " "CLASSIFICATION (AJCC 8th Edition)                               Reporting of pT, pN, and (when applicable) pM categories is based on information available to the pathologist at the time the report is issued. As per the AJCC (Chapter 1, 8th Ed.) it is the managing physician's responsibility to establish the final pathologic stage based upon all pertinent information, including but potentially not limited to this pathology report.                             pT Category:    pT1b                              pN Category:    pN0                              N Suffix:    (sn)                                                         SPECIAL STUDIES                               Estrogen Receptor (ER) Status:    Positive (greater than 10% of cells demonstrate nuclear positivity)                                Progesterone Receptor (PgR) Status:    Positive                                HER2 (by immunohistochemistry):    Negative (Score 0)                                Testing Performed on Case Number:    [id number]       Gross Description 04/22/2025    Final                    Value:A. The specimen is received in formalin, labeled with the patient's name and hospital number, and is designated \" right axillary sentinel lymph node #1.\"  Specimen consists of a nodule of yellow lobulated soft tissue that measures 3.2 x 2.3 x 1.3 cm.  The excess fat is removed exposing a tan rubbery tissue nodule that measures 2.0 x 1.1 x 0.8 cm.  The nodule has an area of blue discoloration up to 0.6 cm.  The specimen is totally submitted in 8 cassettes.  1-4: Entire nodule submitted in sequence  5-8: Remaining fibroadipose tissue  Note: The estimated total formalin fixation time based upon information provided by the submitting clinician and the standard processing schedule is approximately 37.25 hours.  B. The specimen is received in formalin, labeled with the patient's name and hospital number, and is designated \" right axillary sentinel " "lymph node #2 hot and blue.\"  Specimen consists of a single yellow soft tissue nodule that is 2.1 x 1.5 x 1.4 cm.  The specimen is sectioned                           revealing a tan well-circumscribed nodule up to 1.0 cm.  The specimen is totally submitted in 5 cassettes.  1-4: Entire nodule submitted in sequence  5: Remaining soft tissue  Note: The estimated total formalin fixation time based upon information provided by the submitting clinician and the standard processing schedule is approximately 37.25 hours.  Gross C. The specimen is received in formalin, labeled with the patient's name and hospital number, and is designated \" right breast KORI bracketed lumpectomy, marked per margin protocol.\"  The specimen consists of an irregularly-shaped piece of yellow lobulated soft tissue that has been previously inked by the surgeon for orientation.  The specimen is inked as follows; anterior-green, inferior-blue, lateral-orange, medial-yellow, posterior-black, and superior-red.  The specimen measures 13 cm medial to lateral, 10.2 cm superior to inferior, and 4.1 cm anterior to posterior.  The anterior surface has a tan ellipse of skin that is 6.5 x 3.0                           cm.  There is blue discoloration of the skin along the medial side measuring 3.2 x 1.6 cm.  The specimen is serially sectioned in a medial to lateral progression into 26 slices.  A photograph is taken of the slices.    The first Kori is found in slice 9.  A firm tan nodule occurs at the Kori and begins in the previous slice 8.  This nodule is 0.9 x 0.8 x 0.6 cm, and is 1.3 cm from the closest anterior, 1.5 from the closest superior, 1.7 from the closest posterior, and greater than 2 cm from the remaining margins including skin.  A cavity filled with gel and biopsy clip occurs in slices 12 and 13.  This cavity is 1.0 x 1.0 x 0.6 cm.  Grossly, the cavity is 0.9 cm from the closest posterior margin, and greater than 2 cm from the remaining margins " including skin.  A firm tan nodule lies 0.9 cm superior to the cavity measuring 0.5 x 0.4 x 0.3 cm.  This nodule is 0.6 cm from the closest posterior margin.  The second Kori occurs in slice 16.  A firm pink-tan mass at the second KORI measures                           0.8 x 0.8 x 0.5 cm.  There is surrounding surrounding yellow firm tissue that measures 1.5 x 1.0 x 0.5 cm.  The mass is 1.6 cm from the closest posterior, 2.0 cm from the closest anterior, and greater than 2 cm from all remaining inked margins including skin.  Thinner sections of the nodule reveal the butterfly clip.  A separate, firm, ill-defined nodule occurs in slice 18 measuring 0.4 x 0.4 x 0.3 cm.  This nodule is 0.5 cm from the closest posterior, and greater than 2 cm from all remaining inked margins.  The remainder of the specimen has yellow fatty tissue.  Representative sections are submitted in 32 cassettes.  1: Superior portion of medial margin perpendicularly sectioned  2: Inferior portion of medial margin perpendicularly sectioned  3: Soft tissue medial to first nodule  4: Beginning of first nodule slice 7  5: Section of first nodule slice 8  6-8: Sections of first nodule and adjacent closest margins (C7 Kori)  9: Closest skin to first nodule with Kori-slice 10  10-11:                           Bridging tissue between first nodule and second nodule  12-13: Beginning of biopsy cavity with clip slice 12  14-20: Sections with cavity slice 13   14-closest superior   15-closest anterior to firm nodule  16-firm nodule superior to cavity  17-closest skin to cavity  18-19: Cavity thinner sectioned  20: Closest inferior margin  21: Soft tissue lateral to cavity  22: Soft tissue bridging between cavity and second Kori slice 15  23-29: Slice 16 with [place] and butterfly              23: Closest superior to second Kori and butterfly              24: Closest anterior margin to segment Kori with mass             25-26: Mass with closest posterior margin  "with Kori and butterfly-C26 butterfly             27-28: Tissue inferior to second Kori             29: Closest inferior to second Kori slice 16  30: Soft tissue lateral to second Kori  31: Ill-defined pale-tan nodule slice 18  32: Lateral margin perpendicularly sectioned slice 26  Note: The estimated total formalin fixation time based upon                           information provided by the submitting clinician and the standard processing schedule is approximately 37.25 hours.  The cold ischemia time based upon information provided by the submitting clinician and receiving staff in the laboratory is 1 minute.    Additional sections of the fibrous tissue from sections 17 through 25 are submitted per pathologist request.  15 additional cassettes are submitted.  Total of 47 cassettes.  33-36: Sections slice 17  37-39: Sections of slice 18  40-42: Section of slice 19  43: Section of slice 20  44: Section of slice 21  45: Sections slice 22  46-47: Section of slice 24  Note: The estimated total formalin fixation time based upon information provided by the submitting clinician and the standard processing schedule is over 72 hours.  TStevens  D. The specimen is received in formalin, labeled with the patient's name and hospital number, and is designated \" additional anterior margin, ink most distal margin green.\"  The specimen consists of a single                           nodule of yellow soft fibroadipose tissue that is 2.0 x 1.4 x 1.1 cm.  Green ink has been applied to the new margin.  Black ink is added to the remaining margins.  The specimen serially section revealing yellow fibroadipose tissue.  Totally submitted in 3 cassettes.  Note: The estimated total formalin fixation time based upon information provided by the submitting clinician and the standard processing schedule is approximately 37.25 hours.  The cold ischemia time based upon information provided by the submitting clinician and receiving staff in the " "laboratory is 1 minute.  E. The specimen is received in formalin, labeled with the patient's name and hospital number, and is designated \" additional inferior margin, inked most distal margin blue.\"  The specimen consists of a single nodule of yellow lobulated soft tissue that is 1.9 x 1.4 x 1.1 cm.  Blue ink has been applied to the new inferior margin.  Black ink is added to the remaining margins.  The specimen is serially sectioned                           revealing normal-appearing fibroadipose tissue.  Totally submitted in 4 cassettes.  Note: The estimated total formalin fixation time based upon information provided by the submitting clinician and the standard processing schedule is approximately 37.25 hours.  The cold ischemia time based upon information provided by the submitting clinician and receiving staff in the laboratory is 1 minute.  F. The specimen is received in formalin, labeled with the patient's name and hospital number, and is designated \" additional lateral margin, inked most distal margin orange.\"  The specimen consists of a single nodule of yellow soft fibroadipose tissue that is 1.7 x 1.4 x 1.0 cm.  Orange ink is applied to the new lateral margin.  The remaining margins are inked black.  The specimen is serially sectioned revealing normal-appearing yellow fibroadipose tissue.  Totally submitted in 5 cassettes.  Note: The estimated total formalin fixation time based upon information provided by the submitting                           clinician and the standard processing schedule is approximately 37.25 hours.  The cold ischemia time based upon information provided by the submitting clinician and receiving staff in the laboratory is within 1 minute.  G. The specimen is received in formalin, labeled with the patient's name and hospital number, and is designated \" additional medial margin, inked most distal margin yellow.\"  The specimen consists of a single nodule of yellow soft lobulated tissue " "that is 1.6 x 1.2 x 0.8 cm.  The new medial margin is inked yellow.  The remaining margins are inked black.  The specimen is serially sectioned revealing normal-appearing fibroadipose tissue.  Totally submitted in 2 cassettes.  Note: The estimated total formalin fixation time based upon information provided by the submitting clinician and the standard processing schedule is approximately 37.25 hours.  H. The specimen is received in formalin, labeled with the patient's name and hospital number, and is designated \" additional                           posterior margin inked and most distal margin black.\"  The specimen consists of a single nodule of soft lobulated tissue that is 2.0 x 1.1 x 1.0 cm.  Black ink has been applied to the new margin, yellow ink is applied to the remaining margins.  Serial sections reveal normal-appearing yellow fibroadipose tissue.  Totally submitted in 3 cassettes.  Note: The estimated total formalin fixation time based upon information provided by the submitting clinician and the standard processing schedule is approximately 37 hours.  The cold ischemia time based upon information provided by the submitting clinician and receiving staff in the laboratory is within 1 minute.  I. The specimen is received in formalin, labeled with the patient's name and hospital number, and is designated \" additional superior margin, ink most distal margin red.\"  The specimen consists of a single nodule of yellow lobulated soft tissue that measures 2.4 x 1.8 x 0.9 cm.  Red ink has been applied to the new superior margin.                            The remaining margins are inked black.  Sections reveal normal-appearing fibroadipose tissue.  The specimen is totally submitted in 5 cassettes.  Note: The estimated total formalin fixation time based upon information provided by the submitting clinician and the standard processing schedule is approximately 37 hours.  The cold ischemia time based upon information " provided by the submitting clinician and receiving staff in the laboratory is within 1 minute.  TStevens      Clinical Information 04/22/2025    Final                    Value:RIGHT axillary sentinel lymph node #1 hot and blue     Pathology: I have reviewed pathology reports described above.    Radiology Results Review : No pertinent imaging studies reviewed.  Concordance: yes    Administrative Statements   I have spent a total time of 20 minutes in caring for this patient on the day of the visit/encounter including Diagnostic results, Prognosis, Risks and benefits of tx options, Instructions for management, Patient and family education, Importance of tx compliance, Risk factor reductions, Impressions, Counseling / Coordination of care, Documenting in the medical record, Reviewing/placing orders in the medical record (including tests, medications, and/or procedures), and Obtaining or reviewing history  .

## 2025-05-14 NOTE — ASSESSMENT & PLAN NOTE
Orders:    Ambulatory referral to PT/OT lymphedema therapy; Future    Ambulatory referral to Radiation Oncology; Future    Ambulatory Referral to Hematology / Oncology; Future

## 2025-05-14 NOTE — PROGRESS NOTES
Referral to radiation oncology received from Dr. Rivera.  Chart reviewed by oncology nurse navigator at this time.      Diagnosis: right breast cancer.  Patient underwent right breast partial mastectomy with SLN biopsy on 4/22/25.  No known previous RT or implanted devices.  After review of chart, instructions for scheduling added to referral and sent to be scheduled as advised.

## 2025-05-19 ENCOUNTER — TELEPHONE (OUTPATIENT)
Dept: RADIATION ONCOLOGY | Facility: CLINIC | Age: 60
End: 2025-05-19

## 2025-05-20 ENCOUNTER — CONSULT (OUTPATIENT)
Dept: RADIATION ONCOLOGY | Facility: CLINIC | Age: 60
End: 2025-05-20
Attending: SURGERY
Payer: COMMERCIAL

## 2025-05-20 ENCOUNTER — TELEPHONE (OUTPATIENT)
Dept: HEMATOLOGY ONCOLOGY | Facility: CLINIC | Age: 60
End: 2025-05-20

## 2025-05-20 ENCOUNTER — CONSULT (OUTPATIENT)
Dept: HEMATOLOGY ONCOLOGY | Facility: CLINIC | Age: 60
End: 2025-05-20
Attending: SURGERY
Payer: COMMERCIAL

## 2025-05-20 ENCOUNTER — DOCUMENTATION (OUTPATIENT)
Dept: HEMATOLOGY ONCOLOGY | Facility: CLINIC | Age: 60
End: 2025-05-20

## 2025-05-20 VITALS
HEIGHT: 67 IN | DIASTOLIC BLOOD PRESSURE: 78 MMHG | SYSTOLIC BLOOD PRESSURE: 134 MMHG | WEIGHT: 225 LBS | TEMPERATURE: 97.9 F | OXYGEN SATURATION: 95 % | BODY MASS INDEX: 35.31 KG/M2 | HEART RATE: 68 BPM | RESPIRATION RATE: 16 BRPM

## 2025-05-20 VITALS
DIASTOLIC BLOOD PRESSURE: 82 MMHG | OXYGEN SATURATION: 95 % | SYSTOLIC BLOOD PRESSURE: 140 MMHG | TEMPERATURE: 97 F | WEIGHT: 225 LBS | RESPIRATION RATE: 16 BRPM | HEART RATE: 92 BPM | BODY MASS INDEX: 35.24 KG/M2

## 2025-05-20 DIAGNOSIS — C50.911 INFILTRATING DUCTAL CARCINOMA OF RIGHT FEMALE BREAST (HCC): Primary | ICD-10-CM

## 2025-05-20 DIAGNOSIS — C50.411 MALIGNANT NEOPLASM OF UPPER-OUTER QUADRANT OF RIGHT BREAST IN FEMALE, ESTROGEN RECEPTOR POSITIVE (HCC): Primary | ICD-10-CM

## 2025-05-20 DIAGNOSIS — Z17.0 MALIGNANT NEOPLASM OF UPPER-OUTER QUADRANT OF RIGHT BREAST IN FEMALE, ESTROGEN RECEPTOR POSITIVE (HCC): Primary | ICD-10-CM

## 2025-05-20 DIAGNOSIS — Z79.811 LONG TERM (CURRENT) USE OF AROMATASE INHIBITORS: ICD-10-CM

## 2025-05-20 PROCEDURE — 99406 BEHAV CHNG SMOKING 3-10 MIN: CPT | Performed by: INTERNAL MEDICINE

## 2025-05-20 PROCEDURE — G2211 COMPLEX E/M VISIT ADD ON: HCPCS | Performed by: INTERNAL MEDICINE

## 2025-05-20 PROCEDURE — 99211 OFF/OP EST MAY X REQ PHY/QHP: CPT | Performed by: RADIOLOGY

## 2025-05-20 PROCEDURE — 99205 OFFICE O/P NEW HI 60 MIN: CPT | Performed by: RADIOLOGY

## 2025-05-20 PROCEDURE — 99205 OFFICE O/P NEW HI 60 MIN: CPT | Performed by: INTERNAL MEDICINE

## 2025-05-20 RX ORDER — ANASTROZOLE 1 MG/1
1 TABLET ORAL DAILY
Qty: 30 TABLET | Refills: 1 | Status: SHIPPED | OUTPATIENT
Start: 2025-05-20

## 2025-05-20 NOTE — PROGRESS NOTES
Consultation Visit   Name: Emma Brock      : 1965      MRN: [id number]  Encounter Provider: Ruthy Alford MD  Encounter Date: 2025   Encounter department: [facility] RADIATION ONCOLOGY  :  Assessment & Plan  Infiltrating ductal carcinoma of right female breast (HCC)  Emma Brock 1965 is a 59 y.o. female with a history of right tonsil cancer status post resection treated at [facility], but no adjuvant therapy and INESSA, diagnosed with stage Ia ([id number]) right breast grade 2 invasive ductal carcinoma status post lumpectomy and sentinel lymph node biopsy achieving negative margins on 25.  Pathology was significant for EIC with grade 3 DCIS.  Tumor cells were ER/WV positive HER2 negative.     Given her clinical presentation and age, I recommended adjuvant whole breast radiation therapy to the right breast.  We would plan 40Gy.  She could also be considered for 26Gy in 5 fractions if normal tissue tolerance is met per FAST FORWARD 10 year update showing equivalency with moderate hypofractionation.  This would offer the patient with benefit in terms of local control and potential cure.  This follows NCCN guidelines.    The rationale and potential benefits, as well as the risks and acute and late side effects and potential toxicities of radiation were discussed with the patient and her  at length. Side effects discussed included, but were not limited to: Fatigue, skin erythema, hyperpigmentation, desquamation, fibrosis, shrinkage of the breast resulting asymmetry, rib weakening, pulmonary fibrosis, lymphedema.     We discussed alternative of accelerated partial breast irradiation.  Due to the G3 DCIS, she is a conditional patient.  We discussed pros and cons of APBI versus WBI.     The patient was given the opportunity to ask questions and all questions were answered to her satisfaction.   She wished to proceed with radiation and is interested in either whole breast  radiation or APBI.    She is well healed.  -CT simulation scheduled           History of Present Illness   Chief Complaint   Patient presents with    Breast Cancer    Consult   Pertinent Medical History   Emma Brock 1965 is a 59 y.o. female with a history of right tonsil cancer status post resection treated at [facility], but no adjuvant therapy, who was recently diagnosed with right breast grade 2 invasive ductal carcinoma status post lumpectomy and sentinel lymph node biopsy on 4/22/25.  Tumor cells were ER/FL positive HER2 negative. She is being referred by Dr. Rivera and presents today for consult     Patient denies previous radiation.    The patient was found with architectural distortion of the right breast on screening mammogram.  12/13/24 B/L screening mammogram  RIGHT  1) ARCHITECTURAL DISTORTION [A]: There is a questionable area of architectural distortion seen in the upper outer quadrant of the right breast in the posterior depth.   Left  There are no suspicious masses, grouped microcalcifications or areas of unexplained architectural distortion. The skin and nipple areolar complex are unremarkable.     Benign-appearing calcifications are noted in each breast.  RECOMMENDATION:       - Mammo Contrast Enhanced Diagnostic for both breasts.       - Ultrasound at the current time for both breasts.     Further imaging was done:  2/25/25 Contrast enhanced B/L diagnostic mammogram & US right breast  RIGHT  1) MASS [A]  Mammo Contrast Enhanced Diag Bilateral: There is an enhancing round mass with indistinct margins seen in the upper outer quadrant of the right breast in the posterior depth. The mass correlates with the prior mammogram finding.   US breast right limited (diagnostic): There is a 7 mm x 6 mm x 8 mm round, hypoechoic mass with indistinct margins seen in the right breast at 10 o'clock, 6 cm from the nipple. The mass correlates with the prior mammogram finding.  No right axillary adenopathy  is identified.  2) MASS [B]  US breast right limited (diagnostic): There is a 4 mm x 2 mm x 5 mm oval, hypoechoic mass with indistinct margins seen in the right breast at 9 o'clock, 6 cm from the nipple.   3) MASS [C]  US breast right limited (diagnostic): There is a 5 mm x 3 mm x 3 mm oval, hypoechoic mass with indistinct margins seen in the right breast at 10 o'clock, 5 cm from the nipple.   4) MASS [D]  US breast right limited (diagnostic): There is a 4 mm x 2 mm x 4 mm oval, hypoechoic mass with indistinct margins seen in the right breast at 10 o'clock, 3 cm from the nipple.   LEFT  Mammo Contrast Enhanced Diag Bilateral  There is moderate background parenchymal enhancement.  There are no suspicious masses, grouped microcalcifications or areas of unexplained architectural distortion. The skin and nipple areolar complex are unremarkable.   RECOMMENDATION:       - Ultrasound-guided breast biopsy for the right breast.       - Routine screening mammogram in 1 year for the left breast.        2/22/25 US guided biopsy right breast  Right breast 900: adenosis  Right breast 1000 at 5cm: adenosis  Right breast 1000 at 3cm: Atypical ductal hyperplasia  Right breast 1000 at 6cm: Grade 2 invasive mammary carcinoma, no special type and associated with grade 2 DCIS.  Tumor cells were ER/NM(+)HER2(-).       3/3/25 Dr. Rivera  Multifocal right breast invasive ductal carcinoma ER/NM positive HER2 negative tumor.  Will obtain genetic testing  Will obtain staging workup.  Await MammaPrint results.       3/3/25 CT C/A/P w contrast  No evidence of metastatic disease in the chest, abdomen or pelvis.  Chronic bile duct dilation with evidence of small ampullary mass. Given stability since 2019, statistically likely benign. Correlate with biliary function tests. Consider gastroenterology consult.  Other stable and nonemergent findings above.     3/5/25 Bone scan  No scintigraphic evidence of osseous metastasis.      3/24/25   "[place]  CT chest abdomen pelvis and bone scans were reviewed and discussed. After our extensive discussion consent was obtained for right breast conservation surgery with dual tracer technique sentinel lymph node biopsy with bracketed lumpectomy   Surgical consent was obtained        4/22/25 RIGHT BREAST MELVA  BRACKETED LUMPECTOMY (Right: Breast)       LYMPHATIC MAPPING WITH BLUE AND RADIOACTIVE DYES, AXILLARY SENTINEL LYMPH NODE BIOPSY,         Procedure   Excision (less than total mastectomy)   Specimen Laterality   Right   TUMOR   Tumor Site   Clock position       10 o'clock   Tumor Site   Distance from nipple (Centimeters): 6 cm   Histologic Type   Invasive carcinoma of no special type (ductal)   Histologic Grade ([place] Histologic Score)       Glandular (Acinar) / Tubular Differentiation   Score 3   Nuclear Pleomorphism   Score 2   Mitotic Rate   Score 1   Overall Grade   Grade 2 (scores of 6 or 7)   Tumor Size   Greatest dimension of largest invasive focus (Millimeters): 7 mm   Tumor Focality   Single focus of invasive carcinoma   Ductal Carcinoma In Situ (DCIS)   Present       Positive for extensive intraductal component (EIC)   Size (Extent) of DCIS   Estimated size (extent) of DCIS is at least (Millimeters): 16 mm   Architectural Patterns   Micropapillary       Solid   Nuclear Grade   Grade III (high)   Necrosis   Present, central (expansive \"comedo\" necrosis)   Number of Blocks with DCIS   10   Number of Blocks Examined   69   Lymphatic and / or Vascular Invasion   Not identified   Microcalcifications   Present in non-neoplastic tissue   Treatment Effect in the Breast   No known presurgical therapy   MARGINS   Margin Status for Invasive Carcinoma   All margins negative for invasive carcinoma   Distance from Invasive Carcinoma to Closest Margin   Greater than: 2 mm   Margin Status for DCIS   All margins negative for DCIS   Distance from DCIS to Closest Margin   Greater than: 2 mm   REGIONAL LYMPH " NODES   Regional Lymph Node Status   All regional lymph nodes negative for tumor   Total Number of Lymph Nodes Examined (sentinel and non-sentinel)   1   Number of [place] Nodes Examined   1   pTNM CLASSIFICATION (AJCC 8th Edition)   Reporting of pT, pN, and (when applicable) pM categories is based on information available to the pathologist at the time the report is issued. As per the AJCC (Chapter 1, 8th Ed.) it is the managing physician's responsibility to establish the final pathologic stage based upon all pertinent information, including but potentially not limited to this pathology report.   pT Category   pT1b   pN Category   pN0   N Suffix   (sn)   SPECIAL STUDIES           Estrogen Receptor (ER) Status   Positive (greater than 10% of cells demonstrate nuclear positivity)           Progesterone Receptor (PgR) Status   Positive           HER2 (by immunohistochemistry)   Negative (Score 0)   Testing Performed on Case Number   [id number]        25 Dr. Rivera  right breast cancer s/p stage Ia s/p right breast partial mastectomy and sentinel lymph node biopsy.   She is overall doing well.   Pathology was reviewed and discussed extensively.   Will refer her to medical and radiation oncologist for adjuvant radiation and possible endocrine therapy.   Follow up 3 months       The patient has no breast related complaints.  She denies significant pain. She has tenderness near the lumpectomy cavity that is improving.  She denies right breast erythema, wound discharge, palpable nodules, suspicious skin changes, or nipple discharge of the breast bilaterally.  She denies any upper extremity edema or shoulder restriction.    Upcomin25 Dr. Kramer  8/15/25 Dr. Rivera     Oncology History   Cancer Staging   Infiltrating ductal carcinoma of right female breast (HCC)  Staging form: Breast, AJCC 8th Edition  - Clinical stage from 2025: Stage IA (cT1b, cN0, cM0, G2, ER+, ND+, HER2-) - Signed by Luis HAMMONDS  Colleen Rivera MD on 5/14/2025  Histopathologic type: Infiltrating duct carcinoma, NOS  Stage prefix: Initial diagnosis  Nuclear grade: G3  Histologic grading system: 3 grade system  Laterality: Right  Lymph-vascular invasion (LVI): LVI not present (absent)/not identified  Specimen type: Excision  Oncology History   Malignant neoplasm of upper-outer quadrant of right breast in female, estrogen receptor positive (HCC)   2/22/2025 Initial Diagnosis    Malignant neoplasm of upper-outer quadrant of right breast in female, estrogen receptor positive (HCC)     2/22/2025 Biopsy    RIGHT breast ultrasound-guided  A.0900 o'clock, 6 cmfn cm from nipple (butterfly)  Adenosis and sclerosing adenosis    B. 1000 3 cmfn (hat)  Atypical Ductal Hyperplasia    C.1000 5cmfn (cylinder)  Adenosis and sclerosing adenosis,    D. 1000 6 cmfn (MELVA)  Invasive Ductal Hyperplasia with DCIS  Grade 2   NH 60 HER2 0    Concordant. Multifocal. Malignancy measures up to 8mm on US. ADH measures 4mm on US. Axillary US clear. Contralateral breast clear.  IDC and ADH span approximately 4.5 cm.      3/3/2025 Genetic Testing    FlyCast/RNA  VUS SDHA     3/10/2025 Genomic Testing    MammaPrint/BluePrint  LOW RISK   Luminal A  +0.020     4/22/2025 Surgery    RIGHT breast MELVA  lumpectomy with SLN biopsy (Dr. Rivera)  Invasive ductal carcinoma  Grade 2  7 mm  Margins NEG  0/1 lymph nodes      Infiltrating ductal carcinoma of right female breast (HCC)   2/22/2025 -  Cancer Staged    Staging form: Breast, AJCC 8th Edition  - Clinical stage from 2/22/2025: Stage IA (cT1b, cN0, cM0, G2, ER+, NH+, HER2-) - Signed by Luis Rivera MD on 5/14/2025  Histopathologic type: Infiltrating duct carcinoma, NOS  Stage prefix: Initial diagnosis  Nuclear grade: G3  Histologic grading system: 3 grade system  Laterality: Right  Lymph-vascular invasion (LVI): LVI not present (absent)/not identified  Specimen type: Excision       5/9/2025 Initial  Diagnosis    Infiltrating ductal carcinoma of right female breast (HCC)        Review of Systems Refer to nursing note.    Past Medical History:   Diagnosis Date    Acute encephalopathy 06/26/2021    Anxiety 2/12/2025    Bimalleolar fracture of right ankle 06/11/2021    Breast cancer (HCC)     Breast cyst 1980    Cancer (HCC)     right tonsil    Chronic back pain     Fibrocystic breast     HTN (hypertension) 06/21/2021    Hypertension     Migraine     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    Obesity     Right leg DVT (HCC) 09/19/2013     Past Surgical History:   Procedure Laterality Date    BREAST BIOPSY  1980    [place]    BREAST CYST EXCISION Right     40 years ago    BREAST LUMPECTOMY  1980    BREAST LUMPECTOMY Right 4/22/2025    Procedure: RIGHT BREAST MELVA  BRACKETED LUMPECTOMY;  Surgeon: Luis Rivera MD;  Location: [facility];  Service: Surgical Oncology    HYSTERECTOMY      LYMPH NODE BIOPSY      LYMPH NODE BIOPSY Right 4/22/2025    Procedure: LYMPHATIC MAPPING WITH BLUE AND RADIOACTIVE DYES, AXILLARY SENTINEL LYMPH NODE BIOPSY, INJECTION IN OR AT 1015 BY DR CARDARELLI;  Surgeon: Luis Rivera MD;  Location: [facility];  Service: Surgical Oncology    MO OPEN TREATMENT BIMALLEOLAR ANKLE FRACTURE Right 06/21/2021    Procedure: OPEN REDUCTION W/ INTERNAL FIXATION (ORIF) RIGHT ANKLE BIMALLEOLAR FRACTURE;  Surgeon: Joe Bill MD;  Location: [facility];  Service: Orthopedics    TONSILLECTOMY      US BREAST CLIP NEEDLE LOC RIGHT Right 3/28/2025    US GUIDANCE BREAST BIOPSY RIGHT EACH ADDITIONAL Right 02/22/2025    US GUIDANCE BREAST BIOPSY RIGHT EACH ADDITIONAL Right 02/22/2025    US GUIDANCE BREAST BIOPSY RIGHT EACH ADDITIONAL Right 02/22/2025    US GUIDED BREAST BIOPSY RIGHT COMPLETE Right 02/22/2025         Medications Ordered Prior to Encounter[1]   Social History     Tobacco Use    Smoking status: Every Day     Current packs/day: 1.00     Average packs/day: 1  pack/day for 47.4 years (47.4 ttl pk-yrs)     Types: Cigarettes     Start date: 1978    Smokeless tobacco: Never   Vaping Use    Vaping status: Never Used   Substance and Sexual Activity    Alcohol use: Not Currently    Drug use: Not Currently    Sexual activity: Not Currently     Partners: Male     Birth control/protection: Female Sterilization        Objective   /82   Pulse 92   Temp (!) 97 °F (36.1 °C)   Resp 16   Wt 102 kg (225 lb)   LMP  (LMP Unknown)   SpO2 95%   BMI 35.24 kg/m²     Pain Screening:  Pain Score: 0-No pain  ECOG  0  Physical Exam  Vitals and nursing note reviewed.   Constitutional:       General: She is not in acute distress.     Appearance: She is well-developed.     Cardiovascular:      Rate and Rhythm: Normal rate.   Pulmonary:      Breath sounds: No wheezing, rhonchi or rales.   Chest:        Comments: Breast exam demonstrates right breast is smaller than the left.  There is a well-healed outer right breast lumpectomy scar associated with retraction and 2nd axillary scar that is well healed.  There is mild residual edema.  No palpable nodules or suspicious skin changes noted bilaterally.  Abdominal:      Palpations: Abdomen is soft.      Tenderness: There is no abdominal tenderness.     Musculoskeletal:      Right lower leg: No edema.      Left lower leg: No edema.      Comments: No upper extremity edema.  Full range of motion upper extremities bilaterally   Lymphadenopathy:      Cervical: No cervical adenopathy.      Upper Body:      Right upper body: No supraclavicular or axillary adenopathy.      Left upper body: No supraclavicular or axillary adenopathy.     Neurological:      Mental Status: She is alert and oriented to person, place, and time.      Gait: Gait normal.          Radiology Results: I have reviewed radiology images/reports described above.   Pathology Results: I have reviewed pathology reports described above.    Administrative Statements   I have spent a total  time of 60 minutes in caring for this patient on the day of the visit/encounter including Diagnostic results, Prognosis, Risks and benefits of tx options, Documenting in the medical record, Reviewing/placing orders in the medical record (including tests, medications, and/or procedures), Obtaining or reviewing history  , and Communicating with other healthcare professionals .         [1]   Current Outpatient Medications on File Prior to Visit   Medication Sig Dispense Refill    aspirin (ECOTRIN LOW STRENGTH) 81 mg EC tablet Take 81 mg by mouth in the morning.      buprenorphine-naloxone (SUBOXONE) 8-2 mg per SL tablet       furosemide (LASIX) 20 mg tablet take 1 tablet by mouth once daily 90 tablet 5    potassium chloride (Klor-Con M20) 20 mEq tablet take 1 tablet by mouth once daily 90 tablet 3    QUEtiapine (SEROquel) 25 mg tablet take 1 tablet by mouth at bedtime 90 tablet 2    acetaminophen-codeine (TYLENOL with CODEINE #3) 300-30 MG per tablet Take 1 tablet by mouth every 6 (six) hours as needed for moderate pain 20 tablet 0    acetaminophen-codeine (TYLENOL with CODEINE #3) 300-30 MG per tablet Take 1 tablet by mouth every 6 (six) hours as needed for moderate pain 30 tablet 0     No current facility-administered medications on file prior to visit.

## 2025-05-20 NOTE — LETTER
May 20, 2025     No Recipients    Patient: Emma Brock   YOB: 1965   Date of Visit: 2025       Dear Dr. Luis Rivera MD:    Thank you for referring Emma Brock to me for evaluation. Below are my notes for this consultation.    If you have questions, please do not hesitate to call me. I look forward to following your patient along with you.         Sincerely,        Ruthy Alford MD        CC: No Recipients    Ruthy Alford MD  2025  3:07 PM  Sign when Signing Visit  Consultation Visit   Name: Emma Brock      : 1965      MRN: [id number]  Encounter Provider: Ruthy Alford MD  Encounter Date: 2025   Encounter department: [facility] RADIATION ONCOLOGY  :  Assessment & Plan  Infiltrating ductal carcinoma of right female breast (HCC)  Emma Brock 1965 is a 59 y.o. female with a history of right tonsil cancer status post resection treated at [facility], but no adjuvant therapy and INESSA, diagnosed with stage Ia ([id number]) right breast grade 2 invasive ductal carcinoma status post lumpectomy and sentinel lymph node biopsy achieving negative margins on 25.  Pathology was significant for EIC with grade 3 DCIS.  Tumor cells were ER/ME positive HER2 negative.     Given her clinical presentation and age, I recommended adjuvant whole breast radiation therapy to the right breast.  We would plan 40Gy.  She could also be considered for 26Gy in 5 fractions if normal tissue tolerance is met per FAST FORWARD 10 year update showing equivalency with moderate hypofractionation.  This would offer the patient with benefit in terms of local control and potential cure.  This follows NCCN guidelines.    The rationale and potential benefits, as well as the risks and acute and late side effects and potential toxicities of radiation were discussed with the patient and her  at length. Side effects discussed included, but were not limited to: Fatigue,  skin erythema, hyperpigmentation, desquamation, fibrosis, shrinkage of the breast resulting asymmetry, rib weakening, pulmonary fibrosis, lymphedema.     We discussed alternative of accelerated partial breast irradiation.  Due to the G3 DCIS, she is a conditional patient.  We discussed pros and cons of APBI versus WBI.     The patient was given the opportunity to ask questions and all questions were answered to her satisfaction.   She wished to proceed with radiation and is interested in either whole breast radiation or APBI.    She is well healed.  -CT simulation scheduled           History of Present Illness{?Quick Links Encounters * My Last Note * Last Note in Specialty * Snapshot * Since Last Visit * History :38897}  Chief Complaint   Patient presents with   • Breast Cancer   • Consult   Pertinent Medical History  Emma Brock 1965 is a 59 y.o. female with a history of right tonsil cancer status post resection treated at [facility], but no adjuvant therapy, who was recently diagnosed with right breast grade 2 invasive ductal carcinoma status post lumpectomy and sentinel lymph node biopsy on 4/22/25.  Tumor cells were ER/PA positive HER2 negative. She is being referred by Dr. Rivera and presents today for consult     Patient denies previous radiation.    The patient was found with architectural distortion of the right breast on screening mammogram.  12/13/24 B/L screening mammogram  RIGHT  1) ARCHITECTURAL DISTORTION [A]: There is a questionable area of architectural distortion seen in the upper outer quadrant of the right breast in the posterior depth.   Left  There are no suspicious masses, grouped microcalcifications or areas of unexplained architectural distortion. The skin and nipple areolar complex are unremarkable.     Benign-appearing calcifications are noted in each breast.  RECOMMENDATION:       - Mammo Contrast Enhanced Diagnostic for both breasts.       - Ultrasound at the current time for  both breasts.     Further imaging was done:  2/25/25 Contrast enhanced B/L diagnostic mammogram & US right breast  RIGHT  1) MASS [A]  Mammo Contrast Enhanced Diag Bilateral: There is an enhancing round mass with indistinct margins seen in the upper outer quadrant of the right breast in the posterior depth. The mass correlates with the prior mammogram finding.   US breast right limited (diagnostic): There is a 7 mm x 6 mm x 8 mm round, hypoechoic mass with indistinct margins seen in the right breast at 10 o'clock, 6 cm from the nipple. The mass correlates with the prior mammogram finding.  No right axillary adenopathy is identified.  2) MASS [B]  US breast right limited (diagnostic): There is a 4 mm x 2 mm x 5 mm oval, hypoechoic mass with indistinct margins seen in the right breast at 9 o'clock, 6 cm from the nipple.   3) MASS [C]  US breast right limited (diagnostic): There is a 5 mm x 3 mm x 3 mm oval, hypoechoic mass with indistinct margins seen in the right breast at 10 o'clock, 5 cm from the nipple.   4) MASS [D]  US breast right limited (diagnostic): There is a 4 mm x 2 mm x 4 mm oval, hypoechoic mass with indistinct margins seen in the right breast at 10 o'clock, 3 cm from the nipple.   LEFT  Mammo Contrast Enhanced Diag Bilateral  There is moderate background parenchymal enhancement.  There are no suspicious masses, grouped microcalcifications or areas of unexplained architectural distortion. The skin and nipple areolar complex are unremarkable.   RECOMMENDATION:       - Ultrasound-guided breast biopsy for the right breast.       - Routine screening mammogram in 1 year for the left breast.        2/22/25 US guided biopsy right breast  Right breast 900: adenosis  Right breast 1000 at 5cm: adenosis  Right breast 1000 at 3cm: Atypical ductal hyperplasia  Right breast 1000 at 6cm: Grade 2 invasive mammary carcinoma, no special type and associated with grade 2 DCIS.  Tumor cells were ER/FL(+)HER2(-).        3/3/25 Dr. Rivera  Multifocal right breast invasive ductal carcinoma ER/MD positive HER2 negative tumor.  Will obtain genetic testing  Will obtain staging workup.  Await MammaPrint results.       3/3/25 CT C/A/P w contrast  No evidence of metastatic disease in the chest, abdomen or pelvis.  Chronic bile duct dilation with evidence of small ampullary mass. Given stability since 2019, statistically likely benign. Correlate with biliary function tests. Consider gastroenterology consult.  Other stable and nonemergent findings above.     3/5/25 Bone scan  No scintigraphic evidence of osseous metastasis.      3/24/25 Dr. Rivera  CT chest abdomen pelvis and bone scans were reviewed and discussed. After our extensive discussion consent was obtained for right breast conservation surgery with dual tracer technique sentinel lymph node biopsy with bracketed lumpectomy   Surgical consent was obtained        4/22/25 RIGHT BREAST MELVA  BRACKETED LUMPECTOMY (Right: Breast)       LYMPHATIC MAPPING WITH BLUE AND RADIOACTIVE DYES, AXILLARY SENTINEL LYMPH NODE BIOPSY,         Procedure   Excision (less than total mastectomy)   Specimen Laterality   Right   TUMOR   Tumor Site   Clock position       10 o'clock   Tumor Site   Distance from nipple (Centimeters): 6 cm   Histologic Type   Invasive carcinoma of no special type (ductal)   Histologic Grade (Jp Histologic Score)       Glandular (Acinar) / Tubular Differentiation   Score 3   Nuclear Pleomorphism   Score 2   Mitotic Rate   Score 1   Overall Grade   Grade 2 (scores of 6 or 7)   Tumor Size   Greatest dimension of largest invasive focus (Millimeters): 7 mm   Tumor Focality   Single focus of invasive carcinoma   Ductal Carcinoma In Situ (DCIS)   Present       Positive for extensive intraductal component (EIC)   Size (Extent) of DCIS   Estimated size (extent) of DCIS is at least (Millimeters): 16 mm   Architectural Patterns   Micropapillary       Solid   Nuclear Grade   " Grade III (high)   Necrosis   Present, central (expansive \"comedo\" necrosis)   Number of Blocks with DCIS   10   Number of Blocks Examined   69   Lymphatic and / or Vascular Invasion   Not identified   Microcalcifications   Present in non-neoplastic tissue   Treatment Effect in the Breast   No known presurgical therapy   MARGINS   Margin Status for Invasive Carcinoma   All margins negative for invasive carcinoma   Distance from Invasive Carcinoma to Closest Margin   Greater than: 2 mm   Margin Status for DCIS   All margins negative for DCIS   Distance from DCIS to Closest Margin   Greater than: 2 mm   REGIONAL LYMPH NODES   Regional Lymph Node Status   All regional lymph nodes negative for tumor   Total Number of Lymph Nodes Examined (sentinel and non-sentinel)   1   Number of [place] Nodes Examined   1   pTNM CLASSIFICATION (AJCC 8th Edition)   Reporting of pT, pN, and (when applicable) pM categories is based on information available to the pathologist at the time the report is issued. As per the AJCC (Chapter 1, 8th Ed.) it is the managing physician's responsibility to establish the final pathologic stage based upon all pertinent information, including but potentially not limited to this pathology report.   pT Category   pT1b   pN Category   pN0   N Suffix   (sn)   SPECIAL STUDIES           Estrogen Receptor (ER) Status   Positive (greater than 10% of cells demonstrate nuclear positivity)           Progesterone Receptor (PgR) Status   Positive           HER2 (by immunohistochemistry)   Negative (Score 0)   Testing Performed on Case Number   [id number]        5/14/25 Dr. Rivera  right breast cancer s/p stage Ia s/p right breast partial mastectomy and sentinel lymph node biopsy.   She is overall doing well.   Pathology was reviewed and discussed extensively.   Will refer her to medical and radiation oncologist for adjuvant radiation and possible endocrine therapy.   Follow up 3 months       The patient has no " breast related complaints.  She denies significant pain. She has tenderness near the lumpectomy cavity that is improving.  She denies right breast erythema, wound discharge, palpable nodules, suspicious skin changes, or nipple discharge of the breast bilaterally.  She denies any upper extremity edema or shoulder restriction.    Upcomin25 Dr. Kramer  8/15/25 Dr. Rivera     Oncology History  Cancer Staging   Infiltrating ductal carcinoma of right female breast (HCC)  Staging form: Breast, AJCC 8th Edition  - Clinical stage from 2025: Stage IA (cT1b, cN0, cM0, G2, ER+, RI+, HER2-) - Signed by Luis Rivera MD on 2025  Histopathologic type: Infiltrating duct carcinoma, NOS  Stage prefix: Initial diagnosis  Nuclear grade: G3  Histologic grading system: 3 grade system  Laterality: Right  Lymph-vascular invasion (LVI): LVI not present (absent)/not identified  Specimen type: Excision  Oncology History   Malignant neoplasm of upper-outer quadrant of right breast in female, estrogen receptor positive (HCC)   2025 Initial Diagnosis    Malignant neoplasm of upper-outer quadrant of right breast in female, estrogen receptor positive (HCC)     2025 Biopsy    RIGHT breast ultrasound-guided  A.0900 o'clock, 6 cmfn cm from nipple (butterfly)  Adenosis and sclerosing adenosis    B. 1000 3 cmfn (hat)  Atypical Ductal Hyperplasia    C.1000 5cmfn (cylinder)  Adenosis and sclerosing adenosis,    D. 1000 6 cmfn (MELVA)  Invasive Ductal Hyperplasia with DCIS  Grade 2   RI 60 HER2 0    Concordant. Multifocal. Malignancy measures up to 8mm on US. ADH measures 4mm on US. Axillary US clear. Contralateral breast clear.  IDC and ADH span approximately 4.5 cm.      3/3/2025 Genetic Testing    [employer]/RNA  VUS SDHA     3/10/2025 Genomic Testing    MammaPrint/BluePrint  LOW RISK   Luminal A  +0.020     2025 Surgery    RIGHT breast MELVA  lumpectomy with SLN biopsy (Dr. Rivera)  Invasive  ductal carcinoma  Grade 2  7 mm  Margins NEG  0/1 lymph nodes      Infiltrating ductal carcinoma of right female breast (HCC)   2/22/2025 -  Cancer Staged    Staging form: Breast, AJCC 8th Edition  - Clinical stage from 2/22/2025: Stage IA (cT1b, cN0, cM0, G2, ER+, NC+, HER2-) - Signed by Luis Rivera MD on 5/14/2025  Histopathologic type: Infiltrating duct carcinoma, NOS  Stage prefix: Initial diagnosis  Nuclear grade: G3  Histologic grading system: 3 grade system  Laterality: Right  Lymph-vascular invasion (LVI): LVI not present (absent)/not identified  Specimen type: Excision       5/9/2025 Initial Diagnosis    Infiltrating ductal carcinoma of right female breast (HCC)        Review of Systems Refer to nursing note.    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   • Obesity    • Right leg DVT (HCC) 09/19/2013     Past Surgical History:   Procedure Laterality Date   • BREAST BIOPSY  1980    [place], [place]   • BREAST CYST EXCISION Right     40 years ago   • BREAST LUMPECTOMY  1980   • BREAST LUMPECTOMY Right 4/22/2025    Procedure: RIGHT BREAST MELVA  BRACKETED LUMPECTOMY;  Surgeon: Luis Rivera MD;  Location: [facility];  Service: Surgical Oncology   • HYSTERECTOMY     • LYMPH NODE BIOPSY     • LYMPH NODE BIOPSY Right 4/22/2025    Procedure: LYMPHATIC MAPPING WITH BLUE AND RADIOACTIVE DYES, AXILLARY SENTINEL LYMPH NODE BIOPSY, INJECTION IN OR AT 1015 BY DR CARDARELLI;  Surgeon: Luis Rivera MD;  Location: [facility];  Service: Surgical Oncology   • NC OPEN TREATMENT BIMALLEOLAR ANKLE FRACTURE Right 06/21/2021    Procedure: OPEN REDUCTION  W/ INTERNAL FIXATION (ORIF) RIGHT ANKLE BIMALLEOLAR FRACTURE;  Surgeon: Joe Bill MD;  Location: [facility];  Service: Orthopedics   • TONSILLECTOMY     • US BREAST CLIP NEEDLE LOC RIGHT Right 3/28/2025   • US GUIDANCE BREAST BIOPSY RIGHT EACH ADDITIONAL Right 02/22/2025   • US GUIDANCE BREAST BIOPSY RIGHT EACH ADDITIONAL Right 02/22/2025   • US GUIDANCE BREAST BIOPSY RIGHT EACH ADDITIONAL Right 02/22/2025   • US GUIDED BREAST BIOPSY RIGHT COMPLETE Right 02/22/2025         Medications Ordered Prior to Encounter[1]   Social History     Tobacco Use   • Smoking status: Every Day     Current packs/day: 1.00     Average packs/day: 1 pack/day for 47.4 years (47.4 ttl pk-yrs)     Types: Cigarettes     Start date: 1978   • Smokeless tobacco: Never   Vaping Use   • Vaping status: Never Used   Substance and Sexual Activity   • Alcohol use: Not Currently   • Drug use: Not Currently   • Sexual activity: Not Currently     Partners: Male     Birth control/protection: Female Sterilization        Objective{?Quick Links Trend Vitals * Enter New Vitals * Results Review * Timeline (Adult) * Labs * Imaging * Cardiology * Procedures * Lung Cancer Screening * Surgical eConsent :83191}  /82   Pulse 92   Temp (!) 97 °F (36.1 °C)   Resp 16   Wt 102 kg (225 lb)   LMP  (LMP Unknown)   SpO2 95%   BMI 35.24 kg/m²     Pain Screening:  Pain Score: 0-No pain  ECOG  0  Physical Exam  Vitals and nursing note reviewed.   Constitutional:       General: She is not in acute distress.     Appearance: She is well-developed.     Cardiovascular:      Rate and Rhythm: Normal rate.   Pulmonary:      Breath sounds: No wheezing, rhonchi or rales.   Chest:        Comments: Breast exam demonstrates right breast is smaller than the left.  There is a well-healed outer right breast lumpectomy scar associated with retraction and 2nd axillary scar that is well healed.  There is mild residual edema.  No palpable nodules or suspicious skin changes  "noted bilaterally.  Abdominal:      Palpations: Abdomen is soft.      Tenderness: There is no abdominal tenderness.     Musculoskeletal:      Right lower leg: No edema.      Left lower leg: No edema.      Comments: No upper extremity edema.  Full range of motion upper extremities bilaterally   Lymphadenopathy:      Cervical: No cervical adenopathy.      Upper Body:      Right upper body: No supraclavicular or axillary adenopathy.      Left upper body: No supraclavicular or axillary adenopathy.     Neurological:      Mental Status: She is alert and oriented to person, place, and time.      Gait: Gait normal.          Radiology Results: I have reviewed radiology images/reports described above. {Radiology Results Review (Optional):51123}  {Other Study Results Review (Optional):40011::\"No additional pertinent studies reviewed.\"}    Administrative Statements{?Quick Links Full Problem List * [facility] of Service [facility]  I have spent a total time of *** minutes in caring for this patient on the day of the visit/encounter including {Counseling Topics:[id number]}.         [1]   Current Outpatient Medications on File Prior to Visit   Medication Sig Dispense Refill   • aspirin (ECOTRIN LOW STRENGTH) 81 mg EC tablet Take 81 mg by mouth in the morning.     • buprenorphine-naloxone (SUBOXONE) 8-2 mg per SL tablet      • furosemide (LASIX) 20 mg tablet take 1 tablet by mouth once daily 90 tablet 5   • potassium chloride (Klor-Con M20) 20 mEq tablet take 1 tablet by mouth once daily 90 tablet 3   • QUEtiapine (SEROquel) 25 mg tablet take 1 tablet by mouth at bedtime 90 tablet 2   • acetaminophen-codeine (TYLENOL with CODEINE #3) 300-30 MG per tablet Take 1 tablet by mouth every 6 (six) hours as needed for moderate pain 20 tablet 0   • acetaminophen-codeine (TYLENOL with CODEINE #3) 300-30 MG per tablet Take 1 tablet by mouth every 6 (six) hours as needed for moderate pain 30 tablet 0     No current facility-administered " medications on file prior to visit.

## 2025-05-20 NOTE — LETTER
May 20, 2025     Luis Rivera MD  [address]  [address]  [facility] [address]    Patient: Emma Brock   YOB: 1965   Date of Visit: 2025       Dear Dr. Luis Rivera MD:    Thank you for referring Emma Brock to me for evaluation. Below are my notes for this consultation.    If you have questions, please do not hesitate to call me. I look forward to following your patient along with you.         Sincerely,        Ruthy Alford MD        CC: No Recipients    Ruthy Alford MD  2025  3:07 PM  Sign when Signing Visit  Consultation Visit   Name: Emma Brock      : 1965      MRN: [id number]  Encounter Provider: Ruthy Alford MD  Encounter Date: 2025   Encounter department: [facility] RADIATION ONCOLOGY  :  Assessment & Plan  Infiltrating ductal carcinoma of right female breast (HCC)  Emma Brock 1965 is a 59 y.o. female with a history of right tonsil cancer status post resection treated at [facility], but no adjuvant therapy and INESSA, diagnosed with stage Ia ([id number]) right breast grade 2 invasive ductal carcinoma status post lumpectomy and sentinel lymph node biopsy achieving negative margins on 25.  Pathology was significant for EIC with grade 3 DCIS.  Tumor cells were ER/MS positive HER2 negative.     Given her clinical presentation and age, I recommended adjuvant whole breast radiation therapy to the right breast.  We would plan 40Gy.  She could also be considered for 26Gy in 5 fractions if normal tissue tolerance is met per FAST FORWARD 10 year update showing equivalency with moderate hypofractionation.  This would offer the patient with benefit in terms of local control and potential cure.  This follows NCCN guidelines.    The rationale and potential benefits, as well as the risks and acute and late side effects and potential toxicities of radiation were discussed with the patient and her  at length.  Side effects discussed included, but were not limited to: Fatigue, skin erythema, hyperpigmentation, desquamation, fibrosis, shrinkage of the breast resulting asymmetry, rib weakening, pulmonary fibrosis, lymphedema.     We discussed alternative of accelerated partial breast irradiation.  Due to the G3 DCIS, she is a conditional patient.  We discussed pros and cons of APBI versus WBI.     The patient was given the opportunity to ask questions and all questions were answered to her satisfaction.   She wished to proceed with radiation and is interested in either whole breast radiation or APBI.    She is well healed.  -CT simulation scheduled           History of Present Illness{?Quick Links Encounters * My Last Note * Last Note in Specialty * Snapshot * Since Last Visit * History :44997}  Chief Complaint   Patient presents with   • Breast Cancer   • Consult   Pertinent Medical History  Emma Brock 1965 is a 59 y.o. female with a history of right tonsil cancer status post resection treated at [facility], but no adjuvant therapy, who was recently diagnosed with right breast grade 2 invasive ductal carcinoma status post lumpectomy and sentinel lymph node biopsy on 4/22/25.  Tumor cells were ER/MI positive HER2 negative. She is being referred by Dr. Rivera and presents today for consult     Patient denies previous radiation.    The patient was found with architectural distortion of the right breast on screening mammogram.  12/13/24 B/L screening mammogram  RIGHT  1) ARCHITECTURAL DISTORTION [A]: There is a questionable area of architectural distortion seen in the upper outer quadrant of the right breast in the posterior depth.   Left  There are no suspicious masses, grouped microcalcifications or areas of unexplained architectural distortion. The skin and nipple areolar complex are unremarkable.     Benign-appearing calcifications are noted in each breast.  RECOMMENDATION:       - Mammo Contrast Enhanced  Diagnostic for both breasts.       - Ultrasound at the current time for both breasts.     Further imaging was done:  2/25/25 Contrast enhanced B/L diagnostic mammogram & US right breast  RIGHT  1) MASS [A]  Mammo Contrast Enhanced Diag Bilateral: There is an enhancing round mass with indistinct margins seen in the upper outer quadrant of the right breast in the posterior depth. The mass correlates with the prior mammogram finding.   US breast right limited (diagnostic): There is a 7 mm x 6 mm x 8 mm round, hypoechoic mass with indistinct margins seen in the right breast at 10 o'clock, 6 cm from the nipple. The mass correlates with the prior mammogram finding.  No right axillary adenopathy is identified.  2) MASS [B]  US breast right limited (diagnostic): There is a 4 mm x 2 mm x 5 mm oval, hypoechoic mass with indistinct margins seen in the right breast at 9 o'clock, 6 cm from the nipple.   3) MASS [C]  US breast right limited (diagnostic): There is a 5 mm x 3 mm x 3 mm oval, hypoechoic mass with indistinct margins seen in the right breast at 10 o'clock, 5 cm from the nipple.   4) MASS [D]  US breast right limited (diagnostic): There is a 4 mm x 2 mm x 4 mm oval, hypoechoic mass with indistinct margins seen in the right breast at 10 o'clock, 3 cm from the nipple.   LEFT  Mammo Contrast Enhanced Diag Bilateral  There is moderate background parenchymal enhancement.  There are no suspicious masses, grouped microcalcifications or areas of unexplained architectural distortion. The skin and nipple areolar complex are unremarkable.   RECOMMENDATION:       - Ultrasound-guided breast biopsy for the right breast.       - Routine screening mammogram in 1 year for the left breast.        2/22/25 US guided biopsy right breast  Right breast 900: adenosis  Right breast 1000 at 5cm: adenosis  Right breast 1000 at 3cm: Atypical ductal hyperplasia  Right breast 1000 at 6cm: Grade 2 invasive mammary carcinoma, no special type and  associated with grade 2 DCIS.  Tumor cells were ER/IN(+)HER2(-).       3/3/25 Dr. Rivera  Multifocal right breast invasive ductal carcinoma ER/IN positive HER2 negative tumor.  Will obtain genetic testing  Will obtain staging workup.  Await MammaPrint results.       3/3/25 CT C/A/P w contrast  No evidence of metastatic disease in the chest, abdomen or pelvis.  Chronic bile duct dilation with evidence of small ampullary mass. Given stability since 2019, statistically likely benign. Correlate with biliary function tests. Consider gastroenterology consult.  Other stable and nonemergent findings above.     3/5/25 Bone scan  No scintigraphic evidence of osseous metastasis.      3/24/25 Dr. Rivera  CT chest abdomen pelvis and bone scans were reviewed and discussed. After our extensive discussion consent was obtained for right breast conservation surgery with dual tracer technique sentinel lymph node biopsy with bracketed lumpectomy   Surgical consent was obtained        4/22/25 RIGHT BREAST MELVA  BRACKETED LUMPECTOMY (Right: Breast)       LYMPHATIC MAPPING WITH BLUE AND RADIOACTIVE DYES, AXILLARY SENTINEL LYMPH NODE BIOPSY,         Procedure   Excision (less than total mastectomy)   Specimen Laterality   Right   TUMOR   Tumor Site   Clock position       10 o'clock   Tumor Site   Distance from nipple (Centimeters): 6 cm   Histologic Type   Invasive carcinoma of no special type (ductal)   Histologic Grade ([place] Histologic Score)       Glandular (Acinar) / Tubular Differentiation   Score 3   Nuclear Pleomorphism   Score 2   Mitotic Rate   Score 1   Overall Grade   Grade 2 (scores of 6 or 7)   Tumor Size   Greatest dimension of largest invasive focus (Millimeters): 7 mm   Tumor Focality   Single focus of invasive carcinoma   Ductal Carcinoma In Situ (DCIS)   Present       Positive for extensive intraductal component (EIC)   Size (Extent) of DCIS   Estimated size (extent) of DCIS is at least (Millimeters): 16 mm  "  Architectural Patterns   Micropapillary       Solid   Nuclear Grade   Grade III (high)   Necrosis   Present, central (expansive \"comedo\" necrosis)   Number of Blocks with DCIS   10   Number of Blocks Examined   69   Lymphatic and / or Vascular Invasion   Not identified   Microcalcifications   Present in non-neoplastic tissue   Treatment Effect in the Breast   No known presurgical therapy   MARGINS   Margin Status for Invasive Carcinoma   All margins negative for invasive carcinoma   Distance from Invasive Carcinoma to Closest Margin   Greater than: 2 mm   Margin Status for DCIS   All margins negative for DCIS   Distance from DCIS to Closest Margin   Greater than: 2 mm   REGIONAL LYMPH NODES   Regional Lymph Node Status   All regional lymph nodes negative for tumor   Total Number of Lymph Nodes Examined (sentinel and non-sentinel)   1   Number of [place] Nodes Examined   1   pTNM CLASSIFICATION (AJCC 8th Edition)   Reporting of pT, pN, and (when applicable) pM categories is based on information available to the pathologist at the time the report is issued. As per the AJCC (Chapter 1, 8th Ed.) it is the managing physician's responsibility to establish the final pathologic stage based upon all pertinent information, including but potentially not limited to this pathology report.   pT Category   pT1b   pN Category   pN0   N Suffix   (sn)   SPECIAL STUDIES           Estrogen Receptor (ER) Status   Positive (greater than 10% of cells demonstrate nuclear positivity)           Progesterone Receptor (PgR) Status   Positive           HER2 (by immunohistochemistry)   Negative (Score 0)   Testing Performed on Case Number   [id number]        5/14/25 Dr. Rivera  right breast cancer s/p stage Ia s/p right breast partial mastectomy and sentinel lymph node biopsy.   She is overall doing well.   Pathology was reviewed and discussed extensively.   Will refer her to medical and radiation oncologist for adjuvant radiation and " possible endocrine therapy.   Follow up 3 months       The patient has no breast related complaints.  She denies significant pain. She has tenderness near the lumpectomy cavity that is improving.  She denies right breast erythema, wound discharge, palpable nodules, suspicious skin changes, or nipple discharge of the breast bilaterally.  She denies any upper extremity edema or shoulder restriction.    Upcomin25 Dr. Kramer  8/15/25 Dr. Rivera     Oncology History  Cancer Staging   Infiltrating ductal carcinoma of right female breast (HCC)  Staging form: Breast, AJCC 8th Edition  - Clinical stage from 2025: Stage IA (cT1b, cN0, cM0, G2, ER+, NH+, HER2-) - Signed by Luis Rivera MD on 2025  Histopathologic type: Infiltrating duct carcinoma, NOS  Stage prefix: Initial diagnosis  Nuclear grade: G3  Histologic grading system: 3 grade system  Laterality: Right  Lymph-vascular invasion (LVI): LVI not present (absent)/not identified  Specimen type: Excision  Oncology History   Malignant neoplasm of upper-outer quadrant of right breast in female, estrogen receptor positive (HCC)   2025 Initial Diagnosis    Malignant neoplasm of upper-outer quadrant of right breast in female, estrogen receptor positive (HCC)     2025 Biopsy    RIGHT breast ultrasound-guided  A.0900 o'clock, 6 cmfn cm from nipple (butterfly)  Adenosis and sclerosing adenosis    B. 1000 3 cmfn (hat)  Atypical Ductal Hyperplasia    C.1000 5cmfn (cylinder)  Adenosis and sclerosing adenosis,    D. 1000 6 cmfn (MELVA)  Invasive Ductal Hyperplasia with DCIS  Grade 2   NH 60 HER2 0    Concordant. Multifocal. Malignancy measures up to 8mm on US. ADH measures 4mm on US. Axillary US clear. Contralateral breast clear.  IDC and ADH span approximately 4.5 cm.      3/3/2025 Genetic Testing    [employer]/RNA  VUS SDHA     3/10/2025 Genomic Testing    MammaPrint/BluePrint  LOW RISK   Luminal A  +0.020     2025 Surgery     RIGHT breast MELVA  lumpectomy with SLN biopsy (Dr. Rivera)  Invasive ductal carcinoma  Grade 2  7 mm  Margins NEG  0/1 lymph nodes      Infiltrating ductal carcinoma of right female breast (HCC)   2/22/2025 -  Cancer Staged    Staging form: Breast, AJCC 8th Edition  - Clinical stage from 2/22/2025: Stage IA (cT1b, cN0, cM0, G2, ER+, RI+, HER2-) - Signed by Luis Rivera MD on 5/14/2025  Histopathologic type: Infiltrating duct carcinoma, NOS  Stage prefix: Initial diagnosis  Nuclear grade: G3  Histologic grading system: 3 grade system  Laterality: Right  Lymph-vascular invasion (LVI): LVI not present (absent)/not identified  Specimen type: Excision       5/9/2025 Initial Diagnosis    Infiltrating ductal carcinoma of right female breast (HCC)        Review of Systems Refer to nursing note.    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   • Obesity    • Right leg DVT (HCC) 09/19/2013     Past Surgical History:   Procedure Laterality Date   • BREAST BIOPSY  1980    [place]   • BREAST CYST EXCISION Right     40 years ago   • BREAST LUMPECTOMY  1980   • BREAST LUMPECTOMY Right 4/22/2025    Procedure: RIGHT BREAST MELVA  BRACKETED LUMPECTOMY;  Surgeon: Luis Rivera MD;  Location: [facility];  Service: Surgical Oncology   • HYSTERECTOMY     • LYMPH NODE BIOPSY     • LYMPH NODE BIOPSY Right 4/22/2025    Procedure: LYMPHATIC MAPPING WITH BLUE AND RADIOACTIVE DYES, AXILLARY SENTINEL LYMPH NODE BIOPSY, INJECTION IN OR AT 1015 BY DR CARDARELLI;  Surgeon: Luis Rivera MD;  Location: [facility];  Service: Surgical Oncology   • RI OPEN  TREATMENT BIMALLEOLAR ANKLE FRACTURE Right 06/21/2021    Procedure: OPEN REDUCTION W/ INTERNAL FIXATION (ORIF) RIGHT ANKLE BIMALLEOLAR FRACTURE;  Surgeon: Joe Bill MD;  Location: [facility];  Service: Orthopedics   • TONSILLECTOMY     • US BREAST CLIP NEEDLE LOC RIGHT Right 3/28/2025   • US GUIDANCE BREAST BIOPSY RIGHT EACH ADDITIONAL Right 02/22/2025   • US GUIDANCE BREAST BIOPSY RIGHT EACH ADDITIONAL Right 02/22/2025   • US GUIDANCE BREAST BIOPSY RIGHT EACH ADDITIONAL Right 02/22/2025   • US GUIDED BREAST BIOPSY RIGHT COMPLETE Right 02/22/2025         Medications Ordered Prior to Encounter[1]   Social History     Tobacco Use   • Smoking status: Every Day     Current packs/day: 1.00     Average packs/day: 1 pack/day for 47.4 years (47.4 ttl pk-yrs)     Types: Cigarettes     Start date: 1978   • Smokeless tobacco: Never   Vaping Use   • Vaping status: Never Used   Substance and Sexual Activity   • Alcohol use: Not Currently   • Drug use: Not Currently   • Sexual activity: Not Currently     Partners: Male     Birth control/protection: Female Sterilization        Objective{?Quick Links Trend Vitals * Enter New Vitals * Results Review * Timeline (Adult) * Labs * Imaging * Cardiology * Procedures * Lung Cancer Screening * Surgical eConsent :78228}  /82   Pulse 92   Temp (!) 97 °F (36.1 °C)   Resp 16   Wt 102 kg (225 lb)   LMP  (LMP Unknown)   SpO2 95%   BMI 35.24 kg/m²     Pain Screening:  Pain Score: 0-No pain  ECOG  0  Physical Exam  Vitals and nursing note reviewed.   Constitutional:       General: She is not in acute distress.     Appearance: She is well-developed.     Cardiovascular:      Rate and Rhythm: Normal rate.   Pulmonary:      Breath sounds: No wheezing, rhonchi or rales.   Chest:        Comments: Breast exam demonstrates right breast is smaller than the left.  There is a well-healed outer right breast lumpectomy scar associated with retraction and 2nd axillary scar that is well  "healed.  There is mild residual edema.  No palpable nodules or suspicious skin changes noted bilaterally.  Abdominal:      Palpations: Abdomen is soft.      Tenderness: There is no abdominal tenderness.     Musculoskeletal:      Right lower leg: No edema.      Left lower leg: No edema.      Comments: No upper extremity edema.  Full range of motion upper extremities bilaterally   Lymphadenopathy:      Cervical: No cervical adenopathy.      Upper Body:      Right upper body: No supraclavicular or axillary adenopathy.      Left upper body: No supraclavicular or axillary adenopathy.     Neurological:      Mental Status: She is alert and oriented to person, place, and time.      Gait: Gait normal.          Radiology Results: I have reviewed radiology images/reports described above. {Radiology Results Review (Optional):24242}  {Other Study Results Review (Optional):01237::\"No additional pertinent studies reviewed.\"}    Administrative Statements{?Quick Links Full Problem List * [facility] of Service [facility]  I have spent a total time of *** minutes in caring for this patient on the day of the visit/encounter including {Counseling Topics:[id number]}.         [1]   Current Outpatient Medications on File Prior to Visit   Medication Sig Dispense Refill   • aspirin (ECOTRIN LOW STRENGTH) 81 mg EC tablet Take 81 mg by mouth in the morning.     • buprenorphine-naloxone (SUBOXONE) 8-2 mg per SL tablet      • furosemide (LASIX) 20 mg tablet take 1 tablet by mouth once daily 90 tablet 5   • potassium chloride (Klor-Con M20) 20 mEq tablet take 1 tablet by mouth once daily 90 tablet 3   • QUEtiapine (SEROquel) 25 mg tablet take 1 tablet by mouth at bedtime 90 tablet 2   • acetaminophen-codeine (TYLENOL with CODEINE #3) 300-30 MG per tablet Take 1 tablet by mouth every 6 (six) hours as needed for moderate pain 20 tablet 0   • acetaminophen-codeine (TYLENOL with CODEINE #3) 300-30 MG per tablet Take 1 tablet by mouth every 6 (six) " hours as needed for moderate pain 30 tablet 0     No current facility-administered medications on file prior to visit.

## 2025-05-20 NOTE — PROGRESS NOTES
Hematology/Oncology Outpatient Office Note    Date of Service: 2025    [facility] HEMATOLOGY ONCOLOGY SPECIALISTS [facility]      Reason for Consultation:   Chief Complaint   Patient presents with    Consult       Cancer Stage:  Cancer Staging   Infiltrating ductal carcinoma of right female breast (HCC)  Staging form: Breast, AJCC 8th Edition  - Clinical stage from 2025: Stage IA (cT1b, cN0, cM0, G2, ER+, HI+, HER2-) - Signed by Luis Rivera MD on 2025      Referral Physician: Luis Rivera*    Primary Care Physician:  ALEKSANDER Anaya     Nickname: Emma    Spouse: Len Reinoso ECO    Today's ECO    Goals and Barriers:  Current Goal: Minimize effects of disease burden, extend life.   Barriers to accomplishing this: None    Patient's Capacity to Self Care:  Patient is able to self care    ASSESSMENT & PLAN      Diagnosis ICD-10-CM Associated Orders   1. Malignant neoplasm of upper-outer quadrant of right breast in female, estrogen receptor positive (HCC)  C50.411 FSH and LH    Z17.0 Estradiol     DXA bone density spine hip and pelvis     anastrozole (ARIMIDEX) 1 mg tablet      2. Long term (current) use of aromatase inhibitors  Z79.811 DXA bone density spine hip and pelvis        History of Present Illness  The patient is a 59-year-old female with recently diagnosed breast cancer, referred by Dr. Rivera    She was referred to our care by Dr. Rivera  following a diagnosis of breast cancer. She has a family history of breast cancer in her maternal aunt. 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 This condition was treated with surgery at [facility] in [place], and she did not receive radiation therapy. She is not currently following up with ENT for this condition.     Approximately 9 years ago, she experienced deep vein thrombosis in her leg, which occurred  spontaneously without any preceding immobilization or surgery. She was treated with Xarelto for this condition but discontinued it in 2019. She reports no nausea, vomiting, or chest pain. There is no history of heart disease. She is a current smoker, consuming half a pack to a pack daily, but does not use alcohol or other drugs.     She has undergone a hysterectomy in 1996 due to prolonged menstrual bleeding, but retains her ovaries. She reports no current night sweats or hot flashes, although she experienced these symptoms immediately following her hysterectomy. She has not undergone a DEXA scan. She had a Cologuard test at the end of last year or the beginning of this year. She has no history of blood transfusions.     She reports no abnormal nipple discharge. She experiences leg swelling for which she takes Lasix and potassium pills every other day. She takes Seroquel to sleep at night and Suboxone once a day for back pain. She also takes one aspirin a day. She broke her ankle 2 to 3 years ago and was treated at [facility].    PAST SURGICAL HISTORY:  Hysterectomy in 1996  Surgery for tonsil cancer approximately 6 to 7 years ago  Ankle surgery 2 to 3 years ago    SOCIAL HISTORY  The patient smokes half a pack to a pack a day. The patient does not consume alcohol or use any other drugs.    FAMILY HISTORY  The patient has an aunt on her mother's side who had breast cancer.         Discussion of decision making  Oncology history updated, accordingly, during this visit  Goals of care/patient communication  I discussed with the patient the clinical course leading up to their cancer diagnosis. I reviewed relevant office notes, imaging reports and pathology result as well.  I told the patient that this is a case of curable  disease and what this means. We discussed that the goal of anti-cancer therapy is to provide best quality of life, extend overall survival, and progression free survival as shown in clinical trials.    I explained the risks/benefits of the proposed cancer therapy: Aromatase Inhibitor (Arimidex) and after discussion including understanding risks of possible life-threatening complications and therapy-related malignancy development, informed consent has been signed and obtained.  TNM/Staging At Diagnosis  TNM  Cancer Staging   Infiltrating ductal carcinoma of right female breast (HCC)  Staging form: Breast, AJCC 8th Edition  - Clinical stage from 2/22/2025: Stage IA (cT1b, cN0, cM0, G2, ER+, NC+, HER2-) - Signed by Luis Rivera MD on 5/14/2025  Histopathologic type: Infiltrating duct carcinoma, NOS  Stage prefix: Initial diagnosis  Nuclear grade: G3  Histologic grading system: 3 grade system  Laterality: Right  Lymph-vascular invasion (LVI): LVI not present (absent)/not identified  Specimen type: Excision     Disease Features/Tumor Markers/Genetics  Tumor Marker: ER+ NC +  Notable Path Features:   Final Diagnosis   A. [place] lymph node, right axilla, #1 hot and blue, excision:   - One lymph node, negative for metastatic carcinoma (0/1).      B. [place] lymph node, right axilla, #2 hot and blue, excision:   - Benign fibroadipose tissue; no lymph node is identified.      C. Breast, right, lumpectomy:   - Invasive ductal carcinoma.      - [place] Grade: 2 (3+2+1)      - Size: 7 mm      - All surgical margins are negative for invasive carcinoma (with a clearance of >2 mm).   - Ductal carcinoma in situ (DCIS): present      - Nuclear Grade: 2-3 (intermediate to high), solid and micropapillary patterns with central necrosis      - DCIS constitutes ~30% of tumor mass and widely extends beyond invasive tumor borders.      - DCIS is focally present <1 mm from the inked posterior margin in this specimen; refer to parts D through I for final margin status.   - The non-neoplastic breast tissue shows extensive sclerosing adenosis, moderate usual ductal hyperplasia, and apocrine metaplasia.   - Scattered  calcifications are seen in association with benign glands.    - Unremarkable skin.   - Biopsy site changes are present (x 4).     D. Breast, right, additional anterior margin, excision:   - Benign fibroadipose tissue.      E. Breast, right, additional inferior margin, excision:   - Benign, unremarkable breast tissue.      F. Breast, right, additional lateral margin, excision:   - Benign fibroadipose tissue.      G. Breast, right, additional medial margin, excision:   - Benign fibroadipose tissue.      H. Breast, right, additional posterior margin, excision:   - Benign fibroadipose tissue.       I. Breast, right, additional superior margin, excision:   - Benign, unremarkable breast tissue.        Treatment  Other Supportive care: Calcium and vitamin D supplements  Treatment Team Members  Surgeon  Rad Onc  Palliative  Labs  Diagnostics        Discussion of decision making    I personally reviewed the following lab results, the image studies, pathology, other specialty/physicians consult notes and recommendations, and outside medical records. I had a lengthy discussion with the patient and shared the work-up findings. We discussed the diagnosis and management plan as below. I spent  62 minutes reviewing the records (labs, clinician notes, outside records, medical history, ordering medicine/tests/procedures, monitoring of anti-neoplastic toxicities, interpreting the imaging/labs previously done) and coordination of care as well as direct time with the patient today, of which greater than 50% of the time was spent in counseling and coordination of care with the patient/family.    Plan/Labs  FSH, LH, Estradiol levels.   DEXA scan  As for adjuvant endocrine therapy, after baseline DEXA is assessed, we are offering the patient Aromatase inhibitor therapy for 5 years to reduce the risk of breast cancer recurrence.   Side effects of AI therapy were reviewed in detail, which include but are not limited to: peripheral edema,  night sweats, hot flashes, Osteoporosis, risk of VTE or portal vein thrombus, approximately 1% chance of endometrial cancer, mood disturbances, weight loss, and joint discomfort. We also discussed the possible risk of QTc prolongation when combined with certain antidepressants   4.4-9% risk osteoporotic fractures, 33-57% arthralgia, 0.8% VTE, hot flashes 56%, vaginal dryness (13%), CV events (6%) (OR went below 1), cataracts 5% (OR went below 1)    In estrogen-receptor (ER)-positive early-stage breast cancer, 5 years of adjuvant endocrine therapy substantially reduces the risks of locoregional and distant recurrence, contralateral breast cancer, death from breast cancer, and hence death from any cause.1-3 In trials of 5 years of tamoxifen therapy versus no endocrine therapy, the recurrence rate in the tamoxifen group was approximately 50% lower than that in the control group during the first 5 years (the treatment period) and approximately 30% lower during the next 5 years. The rate of death from breast cancer in the tamoxifen group was approximately 30% lower than that in the control group during the first 15 years (i.e., including 10 years after the cessation of therapy).2 In trials comparing an aromatase inhibitor versus tamoxifen in postmenopausal women, the aromatase inhibitor was even more effective than tamoxifen, with about one third fewer recurrences during the treatment period (but with little further gain thereafter) and with approximately 15% fewer deaths from breast cancer during the first decade.  MammaPrint with low risk disease.  No indication for adjuvant chemotherapy.  Start calcium and vitamin D supplements.  Patient had appointment with Dr. Castellon earlier today and the plan is to proceed with radiation treatment.  Patient to start Arimidex after completion of radiation treatment.  Smoking cessation counseling provided.  At least   4 minutes were spent counseling the patient    Follow Up    All  questions were answered to the patient's satisfaction during this encounter. The patient knows the contact information for our office and knows to reach out for any relevant concerns related to this encounter. They are to call for any temperature 100.4 or higher, new symptoms including but not restricted to shaking chills, decreased appetite, nausea, vomiting, diarrhea, increased fatigue, shortness of breath or chest pain, confusion, and not feeling the strength to come to the clinic. For all other listed problems and medical diagnosis in their chart - they are managed by PCP and/or other specialists, which the patient acknowledges. Thank you very much for your consultation and making us a part of this patient's care. We are continuing to follow closely with you. Please do not hesitate to reach out to me with any additional questions or concerns.    Erick Kramer MD  Hematology & Medical Oncology Staff Physician             Disclaimer: This document was prepared using Dragon Medical One. If a word or phrase is confusing, or does not make sense, this is likely due to recognition error which was not discovered during this clinician's review. If you believe an error has occurred, please contact me through [employer] service line for danny?cation.      ONCOLOGY HISTORY OF PRESENT ILLNESS        Oncology History   Malignant neoplasm of upper-outer quadrant of right breast in female, estrogen receptor positive (HCC)   2/22/2025 Initial Diagnosis    Malignant neoplasm of upper-outer quadrant of right breast in female, estrogen receptor positive (HCC)     2/22/2025 Biopsy    RIGHT breast ultrasound-guided  A.0900 o'clock, 6 cmfn cm from nipple (butterfly)  Adenosis and sclerosing adenosis    B. 1000 3 cmfn (hat)  Atypical Ductal Hyperplasia    C.1000 5cmfn (cylinder)  Adenosis and sclerosing adenosis,    D. 1000 6 cmfn (MELVA)  Invasive Ductal Hyperplasia with DCIS  Grade 2   PA 60 HER2 0    Concordant. Multifocal.  Malignancy measures up to 8mm on US. ADH measures 4mm on US. Axillary US clear. Contralateral breast clear.  IDC and ADH span approximately 4.5 cm.      3/3/2025 Genetic Testing    [employer]/RNA  VUS SDHA     3/10/2025 Genomic Testing    MammaPrint/BluePrint  LOW RISK   Luminal A  +0.020     4/22/2025 Surgery    RIGHT breast MELVA  lumpectomy with SLN biopsy (Dr. Rivera)  Invasive ductal carcinoma  Grade 2  7 mm  Margins NEG  0/1 lymph nodes      Infiltrating ductal carcinoma of right female breast (HCC)   2/22/2025 -  Cancer Staged    Staging form: Breast, AJCC 8th Edition  - Clinical stage from 2/22/2025: Stage IA (cT1b, cN0, cM0, G2, ER+, VA+, HER2-) - Signed by Luis Rivera MD on 5/14/2025  Histopathologic type: Infiltrating duct carcinoma, NOS  Stage prefix: Initial diagnosis  Nuclear grade: G3  Histologic grading system: 3 grade system  Laterality: Right  Lymph-vascular invasion (LVI): LVI not present (absent)/not identified  Specimen type: Excision       5/9/2025 Initial Diagnosis    Infiltrating ductal carcinoma of right female breast (HCC)           SUBJECTIVE  (INTERVAL HISTORY)      Clotting History Hx of DVT   Bleeding History none   Cancer History Tonsillar cancer   Family Cancer History Maternal aunt with breast cancer   H/O Blood/Plt Transfusion none   Tobacco/etoh/drug abuse Current smoker   Hx COVID19 Infection and Vaccine Status vaccinated   Cancer Screening history Cologuard 2024   Occupation retd   I have reviewed the relevant past medical, surgical, social and family history. I have also reviewed allergies and medications for this patient.    Review of Systems  Review of Systems      A 10-point review of system was performed, pertinent positive and negative were detailed as above. Otherwise, the 10-point review of system was negative.      Past Medical History:   Diagnosis Date    Acute encephalopathy 06/26/2021    Anxiety 2/12/2025    Bimalleolar fracture of right ankle  06/11/2021    Breast cancer (HCC)     Breast cyst 1980    Cancer (HCC)     right tonsil    Chronic back pain     Fibrocystic breast     HTN (hypertension) 06/21/2021    Hypertension     Migraine     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    Obesity     Right leg DVT (HCC) 09/19/2013       Past Surgical History:   Procedure Laterality Date    BREAST BIOPSY  1980    [place], [place]    BREAST CYST EXCISION Right     40 years ago    BREAST LUMPECTOMY  1980    BREAST LUMPECTOMY Right 4/22/2025    Procedure: RIGHT BREAST MELVA  BRACKETED LUMPECTOMY;  Surgeon: Luis Rivera MD;  Location: [facility];  Service: Surgical Oncology    HYSTERECTOMY      LYMPH NODE BIOPSY      LYMPH NODE BIOPSY Right 4/22/2025    Procedure: LYMPHATIC MAPPING WITH BLUE AND RADIOACTIVE DYES, AXILLARY SENTINEL LYMPH NODE BIOPSY, INJECTION IN OR AT 1015 BY DR CARDARELLI;  Surgeon: Luis Rivera MD;  Location: [facility];  Service: Surgical Oncology    NV OPEN TREATMENT BIMALLEOLAR ANKLE FRACTURE Right 06/21/2021    Procedure: OPEN REDUCTION W/ INTERNAL FIXATION (ORIF) RIGHT ANKLE BIMALLEOLAR FRACTURE;  Surgeon: Joe Bill MD;  Location: [facility];  Service: Orthopedics    TONSILLECTOMY      US BREAST CLIP NEEDLE LOC RIGHT Right 3/28/2025    US GUIDANCE BREAST BIOPSY RIGHT EACH ADDITIONAL Right 02/22/2025    US GUIDANCE BREAST BIOPSY RIGHT EACH ADDITIONAL Right 02/22/2025    US GUIDANCE BREAST BIOPSY RIGHT EACH ADDITIONAL Right 02/22/2025    US GUIDED BREAST BIOPSY RIGHT COMPLETE Right 02/22/2025       Family History   Problem Relation Age of Onset    Lung cancer Mother     COPD Mother     Skin cancer Father     Hypertension Father     Alcohol abuse Father     Depression Father     Diabetes Father     Breast cancer Maternal Aunt     Stomach cancer Maternal Grandmother     Cancer Maternal Grandmother     Completed Suicide  Maternal Grandfather     Ovarian cancer Neg Hx        Social History     Socioeconomic  History    Marital status: /Civil Union     Spouse name: Not on file    Number of children: Not on file    Years of education: Not on file    Highest education level: Not on file   Occupational History    Not on file   Tobacco Use    Smoking status: Every Day     Current packs/day: 1.00     Average packs/day: 1 pack/day for 47.4 years (47.4 ttl pk-yrs)     Types: Cigarettes     Start date: 1978    Smokeless tobacco: Never   Vaping Use    Vaping status: Never Used   Substance and Sexual Activity    Alcohol use: Not Currently    Drug use: Not Currently    Sexual activity: Not Currently     Partners: Male     Birth control/protection: Female Sterilization   Other Topics Concern    Not on file   Social History Narrative    Not on file     Social Drivers of Health     Financial Resource Strain: Not on file   Food Insecurity: Patient Declined (11/8/2024)    Nursing - Inadequate Food Risk Classification     Worried About Running Out of Food in the Last Year: Patient declined     Ran Out of Food in the Last Year: Patient declined     Ran Out of Food in the Last Year: Not on file   Transportation Needs: No Transportation Needs (11/8/2024)    PRAPARE - Transportation     Lack of Transportation (Medical): No     Lack of Transportation (Non-Medical): No   Physical Activity: Not on file   Stress: Not on file   Social Connections: Unknown (6/18/2024)    Received from [email or web address]    Social Connections     How often do you feel lonely or isolated from those around you? (Adult - for ages 18 years and over): Not on file   Intimate Partner Violence: Not on file   Housing Stability: Low Risk  (11/8/2024)    Housing Stability Vital Sign     Unable to Pay for Housing in the Last Year: No     Number of Times Moved in the Last Year: 1     Homeless in the Last Year: No       Allergies[1]    Current Medications[2]    Prescriptions Prior to Admission[3]      Objective:     24 Hour Vitals Assessment:     Vitals:    05/20/25 1459   BP:  134/78   Pulse: 68   Resp: 16   Temp: 97.9 °F (36.6 °C)   SpO2: 95%       PHYSICIAN EXAM :    Physical Exam  Vitals and nursing note reviewed.   Constitutional:       General: She is not in acute distress.     Appearance: Normal appearance.   HENT:      Head: Normocephalic and atraumatic.      Mouth/Throat:      Mouth: Mucous membranes are moist.      Pharynx: Oropharynx is clear.     Eyes:      General: No scleral icterus.     Extraocular Movements: Extraocular movements intact.      Conjunctiva/sclera: Conjunctivae normal.       Cardiovascular:      Rate and Rhythm: Normal rate and regular rhythm.      Pulses: Normal pulses.      Heart sounds: No murmur heard.  Pulmonary:      Effort: Pulmonary effort is normal.      Breath sounds: Normal breath sounds. No wheezing, rhonchi or rales.   Abdominal:      General: Bowel sounds are normal.      Palpations: Abdomen is soft.      Tenderness: There is no abdominal tenderness.     Musculoskeletal:         General: No swelling or tenderness. Normal range of motion.      Cervical back: Neck supple.   Lymphadenopathy:      Cervical: No cervical adenopathy.     Skin:     General: Skin is warm and dry.      Coloration: Skin is not pale.      Findings: No bruising, erythema or rash.     Neurological:      General: No focal deficit present.      Mental Status: She is alert and oriented to person, place, and time.      Motor: No weakness.     Psychiatric:         Mood and Affect: Mood normal.         Behavior: Behavior normal.             DATA REVIEW:    Pathology Result:    Final Diagnosis   Date Value Ref Range Status   04/22/2025   Final    A. [place] lymph node, right axilla, #1 hot and blue, excision:   - One lymph node, negative for metastatic carcinoma (0/1).     B. [place] lymph node, right axilla, #2 hot and blue, excision:   - Benign fibroadipose tissue; no lymph node is identified.     C. Breast, right, lumpectomy:   - Invasive ductal carcinoma.      - [place]  Grade: 2 (3+2+1)      - Size: 7 mm      - All surgical margins are negative for invasive carcinoma (with a clearance of >2 mm).   - Ductal carcinoma in situ (DCIS): present      - Nuclear Grade: 2-3 (intermediate to high), solid and micropapillary patterns with central necrosis      - DCIS constitutes ~30% of tumor mass and widely extends beyond invasive tumor borders.      - DCIS is focally present <1 mm from the inked posterior margin in this specimen; refer to parts D through I for final margin status.   - The non-neoplastic breast tissue shows extensive sclerosing adenosis, moderate usual ductal hyperplasia, and apocrine metaplasia.   - Scattered calcifications are seen in association with benign glands.    - Unremarkable skin.   - Biopsy site changes are present (x 4).    D. Breast, right, additional anterior margin, excision:   - Benign fibroadipose tissue.     E. Breast, right, additional inferior margin, excision:   - Benign, unremarkable breast tissue.     F. Breast, right, additional lateral margin, excision:   - Benign fibroadipose tissue.     G. Breast, right, additional medial margin, excision:   - Benign fibroadipose tissue.     H. Breast, right, additional posterior margin, excision:   - Benign fibroadipose tissue.      I. Breast, right, additional superior margin, excision:   - Benign, unremarkable breast tissue.        02/22/2025   Final    A. Breast, Right, US right brst bx 900 6cmfn, 4 passes, 14g marquee:  - Adenosis and sclerosing adenosis. See note.    NOTE A: Immunostains with antibodies for smooth muscle myosin heavy chain, p63 and E-cadherin confirm the diagnosis of adenosis and sclerosing adenosis.    B. Breast, Right, US right brst bx 1000 3cmfn, 4 passes, 14g marquee:  - Atypical ductal hyperplasia (ADH) in the area of adenosis and sclerosing adenosis with microcalcifications. See note    NOTE B: Immunostains with antibodies for smooth muscle myosin heavy chain and p63 confirm the  diagnosis of adenosis and sclerosing adenosis (blocks B1, B2 and B3).    Immunostains with antibodies for CK5/6 and ER were performed on blocks B1 and B2.  CK5/6 send the ER stains support the diagnosis of ADH    C. Breast, Right, US right brst bx 1000 5cmfn, 4 passes, 14g marquee:  - Adenosis and sclerosing adenosis.  See note.    NOTE C: Immunostains with antibodies for smooth muscle myosin heavy chain and p63 confirm the diagnosis of adenosis and sclerosing adenosis (block C1)    D. Breast, Right, US right brst bx 1000 6cmfn, 4 passes, 14g marquee:  - Invasive ductal carcinoma, moderately differentiated, involving 4 of 4 cores, with focal microcalcifications. Invasive carcinoma measures about 7 mm (0.7 cm) in the needle biopsy material (slide D1). See note  - Ductal carcinoma in situ seen, solid type, intermediate nuclear grade  - Receptor studies will be performed. ADDENDUM TO FOLLOW  - Please see CAP checklist.       NOTE D:  Immunostains with antibodies for smooth muscle myosin heavy chain, p63 and E-cadherin confirm the diagnosis of invasive ductal carcinoma (slide D1).             Image Results:   Image result are reviewed and documented in Hematology/Oncology history    NM sentinal node inj only  Narrative: SENTINEL NODE LYMPHOSCINTIGRAPHY    INDICATION:  C50.411: Malignant neoplasm of upper-outer quadrant of right female breast  Z17.0: Estrogen receptor positive status (ER+), localize sentinel node    FINDINGS:    0.52 mCi Tc-99m filtered sulfur colloid (0.6 cc volume) was administered intradermally into the right breast by Dr. AUDRA MCKEON in the OR.  No imaging was performed.  Impression: Injection of 0.52 mCi Tc-99m filtered sulfur colloid  into the right  breast in the OR.    Workstation performed: [id number]  Mammo clip breast specimen (No Charge)  2 orthogonal specimen radiographs were submitted for interpretation.    Specimen contains 2 Kori  reflectors and Top-Hat, cylinder and  "  HydroMARK butterfly clips.  Margins appear adequate.      LABS:  Lab data are reviewed and documented in HemOnc history.       Lab Results   Component Value Date    HGB 14.0 03/31/2025    HCT 43.4 03/31/2025    MCV 98 03/31/2025     03/31/2025    WBC 7.61 03/31/2025    NRBC 0 03/31/2025     Lab Results   Component Value Date    K 4.2 03/31/2025     03/31/2025    CO2 28 03/31/2025    BUN 10 03/31/2025    CREATININE 0.75 03/31/2025    GLUF 120 (H) 12/13/2024    CALCIUM 9.5 03/31/2025    CORRECTEDCA 10.1 06/26/2021    AST 16 03/31/2025    ALT 12 03/31/2025    ALKPHOS 57 03/31/2025    EGFR 86 03/31/2025       No results found for: \"IRON\", \"TIBC\", \"FERRITIN\"    No results found for: \"[id number]\"    No results for input(s): \"WBC\", \"CREAT\", \"PLT\" in the last 72 hours.     By:  Erick Kramer MD, 5/20/2025, 4:35 PM                                         [1]   Allergies  Allergen Reactions    Morphine Hives    Prednisone      After 2 days, she had swollen tongue,     Trazodone Other (See Comments)     Nightmares    [2]   Current Outpatient Medications   Medication Sig Dispense Refill    anastrozole (ARIMIDEX) 1 mg tablet Take 1 tablet (1 mg total) by mouth daily 30 tablet 1    acetaminophen-codeine (TYLENOL with CODEINE #3) 300-30 MG per tablet Take 1 tablet by mouth every 6 (six) hours as needed for moderate pain 20 tablet 0    acetaminophen-codeine (TYLENOL with CODEINE #3) 300-30 MG per tablet Take 1 tablet by mouth every 6 (six) hours as needed for moderate pain 30 tablet 0    aspirin (ECOTRIN LOW STRENGTH) 81 mg EC tablet Take 81 mg by mouth in the morning.      buprenorphine-naloxone (SUBOXONE) 8-2 mg per SL tablet       furosemide (LASIX) 20 mg tablet take 1 tablet by mouth once daily 90 tablet 5    potassium chloride (Klor-Con M20) 20 mEq tablet take 1 tablet by mouth once daily 90 tablet 3    QUEtiapine (SEROquel) 25 mg tablet take 1 tablet by mouth at bedtime 90 tablet 2     No current " facility-administered medications for this visit.   [3] (Not in a hospital admission)

## 2025-05-20 NOTE — LETTER
May 20, 2025     Luis Rivera MD  [address]  [address]  [facility] [address]    Patient: Emma Brock   YOB: 1965   Date of Visit: 2025       Dear Dr. Luis Rivera MD:    Thank you for referring Emma Brock to me for evaluation. Below are my notes for this consultation.    If you have questions, please do not hesitate to call me. I look forward to following your patient along with you.         Sincerely,        Ruthy Alford MD        CC: No Recipients    Ruthy Alford MD  2025  3:08 PM  Sign when Signing Visit  Consultation Visit   Name: Emma Brock      : 1965      MRN: [id number]  Encounter Provider: Ruthy Alford MD  Encounter Date: 2025   Encounter department: [facility] RADIATION ONCOLOGY  :  Assessment & Plan  Infiltrating ductal carcinoma of right female breast (HCC)  Emma Brock 1965 is a 59 y.o. female with a history of right tonsil cancer status post resection treated at [facility], but no adjuvant therapy and INESSA, diagnosed with stage Ia ([id number]) right breast grade 2 invasive ductal carcinoma status post lumpectomy and sentinel lymph node biopsy achieving negative margins on 25.  Pathology was significant for EIC with grade 3 DCIS.  Tumor cells were ER/PA positive HER2 negative.     Given her clinical presentation and age, I recommended adjuvant whole breast radiation therapy to the right breast.  We would plan 40Gy.  She could also be considered for 26Gy in 5 fractions if normal tissue tolerance is met per FAST FORWARD 10 year update showing equivalency with moderate hypofractionation.  This would offer the patient with benefit in terms of local control and potential cure.  This follows NCCN guidelines.    The rationale and potential benefits, as well as the risks and acute and late side effects and potential toxicities of radiation were discussed with the patient and her  at length.  Side effects discussed included, but were not limited to: Fatigue, skin erythema, hyperpigmentation, desquamation, fibrosis, shrinkage of the breast resulting asymmetry, rib weakening, pulmonary fibrosis, lymphedema.     We discussed alternative of accelerated partial breast irradiation.  Due to the G3 DCIS, she is a conditional patient.  We discussed pros and cons of APBI versus WBI.     The patient was given the opportunity to ask questions and all questions were answered to her satisfaction.   She wished to proceed with radiation and is interested in either whole breast radiation or APBI.    She is well healed.  -CT simulation scheduled           History of Present Illness  Chief Complaint   Patient presents with   • Breast Cancer   • Consult   Pertinent Medical History  Emma Brock 1965 is a 59 y.o. female with a history of right tonsil cancer status post resection treated at [facility], but no adjuvant therapy, who was recently diagnosed with right breast grade 2 invasive ductal carcinoma status post lumpectomy and sentinel lymph node biopsy on 4/22/25.  Tumor cells were ER/HI positive HER2 negative. She is being referred by Dr. Rivera and presents today for consult     Patient denies previous radiation.    The patient was found with architectural distortion of the right breast on screening mammogram.  12/13/24 B/L screening mammogram  RIGHT  1) ARCHITECTURAL DISTORTION [A]: There is a questionable area of architectural distortion seen in the upper outer quadrant of the right breast in the posterior depth.   Left  There are no suspicious masses, grouped microcalcifications or areas of unexplained architectural distortion. The skin and nipple areolar complex are unremarkable.     Benign-appearing calcifications are noted in each breast.  RECOMMENDATION:       - Mammo Contrast Enhanced Diagnostic for both breasts.       - Ultrasound at the current time for both breasts.     Further imaging was  done:  2/25/25 Contrast enhanced B/L diagnostic mammogram & US right breast  RIGHT  1) MASS [A]  Mammo Contrast Enhanced Diag Bilateral: There is an enhancing round mass with indistinct margins seen in the upper outer quadrant of the right breast in the posterior depth. The mass correlates with the prior mammogram finding.   US breast right limited (diagnostic): There is a 7 mm x 6 mm x 8 mm round, hypoechoic mass with indistinct margins seen in the right breast at 10 o'clock, 6 cm from the nipple. The mass correlates with the prior mammogram finding.  No right axillary adenopathy is identified.  2) MASS [B]  US breast right limited (diagnostic): There is a 4 mm x 2 mm x 5 mm oval, hypoechoic mass with indistinct margins seen in the right breast at 9 o'clock, 6 cm from the nipple.   3) MASS [C]  US breast right limited (diagnostic): There is a 5 mm x 3 mm x 3 mm oval, hypoechoic mass with indistinct margins seen in the right breast at 10 o'clock, 5 cm from the nipple.   4) MASS [D]  US breast right limited (diagnostic): There is a 4 mm x 2 mm x 4 mm oval, hypoechoic mass with indistinct margins seen in the right breast at 10 o'clock, 3 cm from the nipple.   LEFT  Mammo Contrast Enhanced Diag Bilateral  There is moderate background parenchymal enhancement.  There are no suspicious masses, grouped microcalcifications or areas of unexplained architectural distortion. The skin and nipple areolar complex are unremarkable.   RECOMMENDATION:       - Ultrasound-guided breast biopsy for the right breast.       - Routine screening mammogram in 1 year for the left breast.        2/22/25 US guided biopsy right breast  Right breast 900: adenosis  Right breast 1000 at 5cm: adenosis  Right breast 1000 at 3cm: Atypical ductal hyperplasia  Right breast 1000 at 6cm: Grade 2 invasive mammary carcinoma, no special type and associated with grade 2 DCIS.  Tumor cells were ER/MO(+)HER2(-).       3/3/25 Dr. Rivera  Multifocal right breast  invasive ductal carcinoma ER/FL positive HER2 negative tumor.  Will obtain genetic testing  Will obtain staging workup.  Await MammaPrint results.       3/3/25 CT C/A/P w contrast  No evidence of metastatic disease in the chest, abdomen or pelvis.  Chronic bile duct dilation with evidence of small ampullary mass. Given stability since 2019, statistically likely benign. Correlate with biliary function tests. Consider gastroenterology consult.  Other stable and nonemergent findings above.     3/5/25 Bone scan  No scintigraphic evidence of osseous metastasis.      3/24/25 Dr. Rivera  CT chest abdomen pelvis and bone scans were reviewed and discussed. After our extensive discussion consent was obtained for right breast conservation surgery with dual tracer technique sentinel lymph node biopsy with bracketed lumpectomy   Surgical consent was obtained        4/22/25 RIGHT BREAST MELVA  BRACKETED LUMPECTOMY (Right: Breast)       LYMPHATIC MAPPING WITH BLUE AND RADIOACTIVE DYES, AXILLARY SENTINEL LYMPH NODE BIOPSY,         Procedure   Excision (less than total mastectomy)   Specimen Laterality   Right   TUMOR   Tumor Site   Clock position       10 o'clock   Tumor Site   Distance from nipple (Centimeters): 6 cm   Histologic Type   Invasive carcinoma of no special type (ductal)   Histologic Grade ([place] Histologic Score)       Glandular (Acinar) / Tubular Differentiation   Score 3   Nuclear Pleomorphism   Score 2   Mitotic Rate   Score 1   Overall Grade   Grade 2 (scores of 6 or 7)   Tumor Size   Greatest dimension of largest invasive focus (Millimeters): 7 mm   Tumor Focality   Single focus of invasive carcinoma   Ductal Carcinoma In Situ (DCIS)   Present       Positive for extensive intraductal component (EIC)   Size (Extent) of DCIS   Estimated size (extent) of DCIS is at least (Millimeters): 16 mm   Architectural Patterns   Micropapillary       Solid   Nuclear Grade   Grade III (high)   Necrosis   Present,  "central (expansive \"comedo\" necrosis)   Number of Blocks with DCIS   10   Number of Blocks Examined   69   Lymphatic and / or Vascular Invasion   Not identified   Microcalcifications   Present in non-neoplastic tissue   Treatment Effect in the Breast   No known presurgical therapy   MARGINS   Margin Status for Invasive Carcinoma   All margins negative for invasive carcinoma   Distance from Invasive Carcinoma to Closest Margin   Greater than: 2 mm   Margin Status for DCIS   All margins negative for DCIS   Distance from DCIS to Closest Margin   Greater than: 2 mm   REGIONAL LYMPH NODES   Regional Lymph Node Status   All regional lymph nodes negative for tumor   Total Number of Lymph Nodes Examined (sentinel and non-sentinel)   1   Number of [place] Nodes Examined   1   pTNM CLASSIFICATION (AJCC 8th Edition)   Reporting of pT, pN, and (when applicable) pM categories is based on information available to the pathologist at the time the report is issued. As per the AJCC (Chapter 1, 8th Ed.) it is the managing physician's responsibility to establish the final pathologic stage based upon all pertinent information, including but potentially not limited to this pathology report.   pT Category   pT1b   pN Category   pN0   N Suffix   (sn)   SPECIAL STUDIES           Estrogen Receptor (ER) Status   Positive (greater than 10% of cells demonstrate nuclear positivity)           Progesterone Receptor (PgR) Status   Positive           HER2 (by immunohistochemistry)   Negative (Score 0)   Testing Performed on Case Number   [id number]        5/14/25 Dr. Rivera  right breast cancer s/p stage Ia s/p right breast partial mastectomy and sentinel lymph node biopsy.   She is overall doing well.   Pathology was reviewed and discussed extensively.   Will refer her to medical and radiation oncologist for adjuvant radiation and possible endocrine therapy.   Follow up 3 months       The patient has no breast related complaints.  She denies " significant pain. She has tenderness near the lumpectomy cavity that is improving.  She denies right breast erythema, wound discharge, palpable nodules, suspicious skin changes, or nipple discharge of the breast bilaterally.  She denies any upper extremity edema or shoulder restriction.    Upcomin25 Dr. Kramer  8/15/25 Dr. Rivera     Oncology History  Cancer Staging   Infiltrating ductal carcinoma of right female breast (HCC)  Staging form: Breast, AJCC 8th Edition  - Clinical stage from 2025: Stage IA (cT1b, cN0, cM0, G2, ER+, LA+, HER2-) - Signed by Luis Rivera MD on 2025  Histopathologic type: Infiltrating duct carcinoma, NOS  Stage prefix: Initial diagnosis  Nuclear grade: G3  Histologic grading system: 3 grade system  Laterality: Right  Lymph-vascular invasion (LVI): LVI not present (absent)/not identified  Specimen type: Excision  Oncology History   Malignant neoplasm of upper-outer quadrant of right breast in female, estrogen receptor positive (HCC)   2025 Initial Diagnosis    Malignant neoplasm of upper-outer quadrant of right breast in female, estrogen receptor positive (HCC)     2025 Biopsy    RIGHT breast ultrasound-guided  A.0900 o'clock, 6 cmfn cm from nipple (butterfly)  Adenosis and sclerosing adenosis    B. 1000 3 cmfn (hat)  Atypical Ductal Hyperplasia    C.1000 5cmfn (cylinder)  Adenosis and sclerosing adenosis,    D. 1000 6 cmfn (MELVA)  Invasive Ductal Hyperplasia with DCIS  Grade 2   LA 60 HER2 0    Concordant. Multifocal. Malignancy measures up to 8mm on US. ADH measures 4mm on US. Axillary US clear. Contralateral breast clear.  IDC and ADH span approximately 4.5 cm.      3/3/2025 Genetic Testing    [employer]/RNA  VUS SDHA     3/10/2025 Genomic Testing    MammaPrint/BluePrint  LOW RISK   Luminal A  +0.020     2025 Surgery    RIGHT breast MELVA  lumpectomy with SLN biopsy (Dr. Rivera)  Invasive ductal carcinoma  Grade 2  7  mm  Margins NEG  0/1 lymph nodes      Infiltrating ductal carcinoma of right female breast (HCC)   2/22/2025 -  Cancer Staged    Staging form: Breast, AJCC 8th Edition  - Clinical stage from 2/22/2025: Stage IA (cT1b, cN0, cM0, G2, ER+, NE+, HER2-) - Signed by Luis Rivera MD on 5/14/2025  Histopathologic type: Infiltrating duct carcinoma, NOS  Stage prefix: Initial diagnosis  Nuclear grade: G3  Histologic grading system: 3 grade system  Laterality: Right  Lymph-vascular invasion (LVI): LVI not present (absent)/not identified  Specimen type: Excision       5/9/2025 Initial Diagnosis    Infiltrating ductal carcinoma of right female breast (HCC)        Review of Systems Refer to nursing note.    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   • Obesity    • Right leg DVT (HCC) 09/19/2013     Past Surgical History:   Procedure Laterality Date   • BREAST BIOPSY  1980    [place], [place]   • BREAST CYST EXCISION Right     40 years ago   • BREAST LUMPECTOMY  1980   • BREAST LUMPECTOMY Right 4/22/2025    Procedure: RIGHT BREAST MELVA  BRACKETED LUMPECTOMY;  Surgeon: Luis Rivera MD;  Location: [facility];  Service: Surgical Oncology   • HYSTERECTOMY     • LYMPH NODE BIOPSY     • LYMPH NODE BIOPSY Right 4/22/2025    Procedure: LYMPHATIC MAPPING WITH BLUE AND RADIOACTIVE DYES, AXILLARY SENTINEL LYMPH NODE BIOPSY, INJECTION IN OR AT 1015 BY DR CARDARELLI;  Surgeon: Luis Rivera MD;  Location: [facility];  Service: Surgical Oncology   • NE OPEN TREATMENT BIMALLEOLAR ANKLE FRACTURE Right 06/21/2021    Procedure: OPEN REDUCTION W/ INTERNAL FIXATION (ORIF)  RIGHT ANKLE BIMALLEOLAR FRACTURE;  Surgeon: Joe Bill MD;  Location: [facility];  Service: Orthopedics   • TONSILLECTOMY     • US BREAST CLIP NEEDLE LOC RIGHT Right 3/28/2025   • US GUIDANCE BREAST BIOPSY RIGHT EACH ADDITIONAL Right 02/22/2025   • US GUIDANCE BREAST BIOPSY RIGHT EACH ADDITIONAL Right 02/22/2025   • US GUIDANCE BREAST BIOPSY RIGHT EACH ADDITIONAL Right 02/22/2025   • US GUIDED BREAST BIOPSY RIGHT COMPLETE Right 02/22/2025         Medications Ordered Prior to Encounter[1]   Social History     Tobacco Use   • Smoking status: Every Day     Current packs/day: 1.00     Average packs/day: 1 pack/day for 47.4 years (47.4 ttl pk-yrs)     Types: Cigarettes     Start date: 1978   • Smokeless tobacco: Never   Vaping Use   • Vaping status: Never Used   Substance and Sexual Activity   • Alcohol use: Not Currently   • Drug use: Not Currently   • Sexual activity: Not Currently     Partners: Male     Birth control/protection: Female Sterilization        Objective  /82   Pulse 92   Temp (!) 97 °F (36.1 °C)   Resp 16   Wt 102 kg (225 lb)   LMP  (LMP Unknown)   SpO2 95%   BMI 35.24 kg/m²     Pain Screening:  Pain Score: 0-No pain  ECOG  0  Physical Exam  Vitals and nursing note reviewed.   Constitutional:       General: She is not in acute distress.     Appearance: She is well-developed.     Cardiovascular:      Rate and Rhythm: Normal rate.   Pulmonary:      Breath sounds: No wheezing, rhonchi or rales.   Chest:        Comments: Breast exam demonstrates right breast is smaller than the left.  There is a well-healed outer right breast lumpectomy scar associated with retraction and 2nd axillary scar that is well healed.  There is mild residual edema.  No palpable nodules or suspicious skin changes noted bilaterally.  Abdominal:      Palpations: Abdomen is soft.      Tenderness: There is no abdominal tenderness.     Musculoskeletal:      Right lower leg: No edema.      Left lower leg: No edema.       Comments: No upper extremity edema.  Full range of motion upper extremities bilaterally   Lymphadenopathy:      Cervical: No cervical adenopathy.      Upper Body:      Right upper body: No supraclavicular or axillary adenopathy.      Left upper body: No supraclavicular or axillary adenopathy.     Neurological:      Mental Status: She is alert and oriented to person, place, and time.      Gait: Gait normal.          Radiology Results: I have reviewed radiology images/reports described above.   Pathology Results: I have reviewed pathology reports described above.    Administrative Statements  I have spent a total time of 60 minutes in caring for this patient on the day of the visit/encounter including Diagnostic results, Prognosis, Risks and benefits of tx options, Documenting in the medical record, Reviewing/placing orders in the medical record (including tests, medications, and/or procedures), Obtaining or reviewing history  , and Communicating with other healthcare professionals .         [1]   Current Outpatient Medications on File Prior to Visit   Medication Sig Dispense Refill   • aspirin (ECOTRIN LOW STRENGTH) 81 mg EC tablet Take 81 mg by mouth in the morning.     • buprenorphine-naloxone (SUBOXONE) 8-2 mg per SL tablet      • furosemide (LASIX) 20 mg tablet take 1 tablet by mouth once daily 90 tablet 5   • potassium chloride (Klor-Con M20) 20 mEq tablet take 1 tablet by mouth once daily 90 tablet 3   • QUEtiapine (SEROquel) 25 mg tablet take 1 tablet by mouth at bedtime 90 tablet 2   • acetaminophen-codeine (TYLENOL with CODEINE #3) 300-30 MG per tablet Take 1 tablet by mouth every 6 (six) hours as needed for moderate pain 20 tablet 0   • acetaminophen-codeine (TYLENOL with CODEINE #3) 300-30 MG per tablet Take 1 tablet by mouth every 6 (six) hours as needed for moderate pain 30 tablet 0     No current facility-administered medications on file prior to visit.

## 2025-05-20 NOTE — ASSESSMENT & PLAN NOTE
Emma Brock 1965 is a 59 y.o. female with a history of right tonsil cancer status post resection treated at [facility], but no adjuvant therapy and INESSA, diagnosed with stage Ia ([id number]) right breast grade 2 invasive ductal carcinoma status post lumpectomy and sentinel lymph node biopsy achieving negative margins on 4/22/25.  Pathology was significant for EIC with grade 3 DCIS.  Tumor cells were ER/AZ positive HER2 negative.     Given her clinical presentation and age, I recommended adjuvant whole breast radiation therapy to the right breast.  We would plan 40Gy.  She could also be considered for 26Gy in 5 fractions if normal tissue tolerance is met per FAST FORWARD 10 year update showing equivalency with moderate hypofractionation.  This would offer the patient with benefit in terms of local control and potential cure.  This follows NCCN guidelines.    The rationale and potential benefits, as well as the risks and acute and late side effects and potential toxicities of radiation were discussed with the patient and her  at length. Side effects discussed included, but were not limited to: Fatigue, skin erythema, hyperpigmentation, desquamation, fibrosis, shrinkage of the breast resulting asymmetry, rib weakening, pulmonary fibrosis, lymphedema.     We discussed alternative of accelerated partial breast irradiation.  Due to the G3 DCIS, she is a conditional patient.  We discussed pros and cons of APBI versus WBI.     The patient was given the opportunity to ask questions and all questions were answered to her satisfaction.   She wished to proceed with radiation and is interested in either whole breast radiation or APBI.    She is well healed.  -CT simulation scheduled

## 2025-05-20 NOTE — PROGRESS NOTES
Emma Brock 1965 is a 59 y.o. female with a history of right tonsil cancer treated at [facility], who was diagnosed with multifocal right breast invasive ductal carcinoma ER/CT positive HER2 negative. She presented with an abnormal screening mammogram. She underwent biopsy followed by right breast lumpectomy with sentinel lymph node biopsy on 4/22/25. She is being referred by Dr. Rivera and presents today for consult      12/13/24 B/L screening mammogram  RIGHT  1) ARCHITECTURAL DISTORTION [A]: There is a questionable area of architectural distortion seen in the upper outer quadrant of the right breast in the posterior depth.   Left  There are no suspicious masses, grouped microcalcifications or areas of unexplained architectural distortion. The skin and nipple areolar complex are unremarkable.     Benign-appearing calcifications are noted in each breast.  RECOMMENDATION:       - Mammo Contrast Enhanced Diagnostic for both breasts.       - Ultrasound at the current time for both breasts.      2/25/25 Contrast enhanced B/L diagnostic mammogram & US right breast  RIGHT  1) MASS [A]  Mammo Contrast Enhanced Diag Bilateral: There is an enhancing round mass with indistinct margins seen in the upper outer quadrant of the right breast in the posterior depth. The mass correlates with the prior mammogram finding.   US breast right limited (diagnostic): There is a 7 mm x 6 mm x 8 mm round, hypoechoic mass with indistinct margins seen in the right breast at 10 o'clock, 6 cm from the nipple. The mass correlates with the prior mammogram finding.  No right axillary adenopathy is identified.  2) MASS [B]  US breast right limited (diagnostic): There is a 4 mm x 2 mm x 5 mm oval, hypoechoic mass with indistinct margins seen in the right breast at 9 o'clock, 6 cm from the nipple.   3) MASS [C]  US breast right limited (diagnostic): There is a 5 mm x 3 mm x 3 mm oval, hypoechoic mass with indistinct margins seen in the right  breast at 10 o'clock, 5 cm from the nipple.   4) MASS [D]  US breast right limited (diagnostic): There is a 4 mm x 2 mm x 4 mm oval, hypoechoic mass with indistinct margins seen in the right breast at 10 o'clock, 3 cm from the nipple.   LEFT  Mammo Contrast Enhanced Diag Bilateral  There is moderate background parenchymal enhancement.  There are no suspicious masses, grouped microcalcifications or areas of unexplained architectural distortion. The skin and nipple areolar complex are unremarkable.   RECOMMENDATION:       - Ultrasound-guided breast biopsy for the right breast.       - Routine screening mammogram in 1 year for the left breast.         2/22/25 US guided biopsy right breast      3/3/25 Dr. Rivera  Multifocal right breast invasive ductal carcinoma ER/VA positive HER2 negative tumor.  Will obtain genetic testing  Will obtain staging workup.  Await MammaPrint results.      3/3/25 CT C/A/P w contrast  No evidence of metastatic disease in the chest, abdomen or pelvis.  Chronic bile duct dilation with evidence of small ampullary mass. Given stability since 2019, statistically likely benign. Correlate with biliary function tests. Consider gastroenterology consult.  Other stable and nonemergent findings above.      3/5/25 Bone scan  No scintigraphic evidence of osseous metastasis.       3/24/25 Dr. Rivera  CT chest abdomen pelvis and bone scans were reviewed and discussed. After our extensive discussion consent was obtained for right breast conservation surgery with dual tracer technique sentinel lymph node biopsy with bracketed lumpectomy   Surgical consent was obtained       4/22/25 RIGHT BREAST MELVA  BRACKETED LUMPECTOMY (Right: Breast)       LYMPHATIC MAPPING WITH BLUE AND RADIOACTIVE DYES, AXILLARY SENTINEL LYMPH NODE BIOPSY,     Procedure  Excision (less than total mastectomy)   Specimen Laterality  Right   TUMOR   Tumor Site  Clock position     10 o'clock   Tumor Site  Distance from nipple  "(Centimeters): 6 cm   Histologic Type  Invasive carcinoma of no special type (ductal)   Histologic Grade (Jp Histologic Score)     Glandular (Acinar) / Tubular Differentiation  Score 3   Nuclear Pleomorphism  Score 2   Mitotic Rate  Score 1   Overall Grade  Grade 2 (scores of 6 or 7)   Tumor Size  Greatest dimension of largest invasive focus (Millimeters): 7 mm   Tumor Focality  Single focus of invasive carcinoma   Ductal Carcinoma In Situ (DCIS)  Present     Positive for extensive intraductal component (EIC)   Size (Extent) of DCIS  Estimated size (extent) of DCIS is at least (Millimeters): 16 mm   Architectural Patterns  Micropapillary     Solid   Nuclear Grade  Grade III (high)   Necrosis  Present, central (expansive \"comedo\" necrosis)   Number of Blocks with DCIS  10   Number of Blocks Examined  69   Lymphatic and / or Vascular Invasion  Not identified   Microcalcifications  Present in non-neoplastic tissue   Treatment Effect in the Breast  No known presurgical therapy   MARGINS   Margin Status for Invasive Carcinoma  All margins negative for invasive carcinoma   Distance from Invasive Carcinoma to Closest Margin  Greater than: 2 mm   Margin Status for DCIS  All margins negative for DCIS   Distance from DCIS to Closest Margin  Greater than: 2 mm   REGIONAL LYMPH NODES   Regional Lymph Node Status  All regional lymph nodes negative for tumor   Total Number of Lymph Nodes Examined (sentinel and non-sentinel)  1   Number of [place] Nodes Examined  1   pTNM CLASSIFICATION (AJCC 8th Edition)   Reporting of pT, pN, and (when applicable) pM categories is based on information available to the pathologist at the time the report is issued. As per the AJCC (Chapter 1, 8th Ed.) it is the managing physician's responsibility to establish the final pathologic stage based upon all pertinent information, including but potentially not limited to this pathology report.   pT Category  pT1b   pN Category  pN0   N Suffix  " (sn)   SPECIAL STUDIES        Estrogen Receptor (ER) Status  Positive (greater than 10% of cells demonstrate nuclear positivity)        Progesterone Receptor (PgR) Status  Positive        HER2 (by immunohistochemistry)  Negative (Score 0)   Testing Performed on Case Number  [id number]         25 Dr. Rivera  right breast cancer s/p stage Ia s/p right breast partial mastectomy and sentinel lymph node biopsy.   She is overall doing well.   Pathology was reviewed and discussed extensively.   Will refer her to medical and radiation oncologist for adjuvant radiation and possible endocrine therapy.   Follow up 3 months      Upcomin25 Dr. Kramer  8/15/25 Dr. Rivera    Oncology History   Malignant neoplasm of upper-outer quadrant of right breast in female, estrogen receptor positive (HCC)   2025 Initial Diagnosis    Malignant neoplasm of upper-outer quadrant of right breast in female, estrogen receptor positive (HCC)     2025 Biopsy    RIGHT breast ultrasound-guided  A.0900 o'clock, 6 cmfn cm from nipple (butterfly)  Adenosis and sclerosing adenosis    B. 1000 3 cmfn (hat)  Atypical Ductal Hyperplasia    C.1000 5cmfn (cylinder)  Adenosis and sclerosing adenosis,    D. 1000 6 cmfn (MELVA)  Invasive Ductal Hyperplasia with DCIS  Grade 2   DE 60 HER2 0    Concordant. Multifocal. Malignancy measures up to 8mm on US. ADH measures 4mm on US. Axillary US clear. Contralateral breast clear.  IDC and ADH span approximately 4.5 cm.      3/3/2025 Genetic Testing    [employer]/RNA  VUS SDHA     3/10/2025 Genomic Testing    MammaPrint/BluePrint  LOW RISK   Luminal A  +0.020     2025 Surgery    RIGHT breast MELVA  lumpectomy with SLN biopsy (Dr. Rivera)  Invasive ductal carcinoma  Grade 2  7 mm  Margins NEG  0/1 lymph nodes      Infiltrating ductal carcinoma of right female breast (HCC)   2025 -  Cancer Staged    Staging form: Breast, AJCC 8th Edition  - Clinical stage from 2025: Stage  IA (cT1b, cN0, cM0, G2, ER+, NM+, HER2-) - Signed by Luis Rivera MD on 2025  Histopathologic type: Infiltrating duct carcinoma, NOS  Stage prefix: Initial diagnosis  Nuclear grade: G3  Histologic grading system: 3 grade system  Laterality: Right  Lymph-vascular invasion (LVI): LVI not present (absent)/not identified  Specimen type: Excision       2025 Initial Diagnosis    Infiltrating ductal carcinoma of right female breast (HCC)         Review of Systems:  Review of Systems   Constitutional:  Positive for fatigue.   HENT:  Positive for dental problem. Negative for sore throat, trouble swallowing and voice change.         Dysgeusia     Eyes:         Wears glasses   Respiratory:  Negative for cough, shortness of breath and wheezing.         Every day smoker   Cardiovascular:  Positive for leg swelling (B/L legs).   Gastrointestinal: Negative.    Genitourinary: Negative.    Musculoskeletal:  Positive for arthralgias and back pain.   Skin:  Positive for wound (right breast surgical wounds healing).   Allergic/Immunologic: Negative.    Neurological: Negative.    Hematological:  Bruises/bleeds easily.   Psychiatric/Behavioral:  Positive for sleep disturbance. Negative for suicidal ideas. The patient is nervous/anxious.        Clinical Trial: no    OB/GYN History:  The patient underwent menarche at 10 years  Menopause Status Post  No LMP recorded (lmp unknown). Patient has had a hysterectomy.  Menopause at 30} years.  Menopause Reason: hysterectomy  Hormone replacement therapy: no.    2   Para 2   Age at first delivery being 19 years.   Nursing: yes.   Birth control pills: yes.  Years used: 12    Pregnancy test needed:  no    ONCOTYPE/MAMMOPRINT results: 3/10/25 MammaPrint low risk    PFT: N/A    Prior Radiation: no    Teaching: NIH book, side effects, SIm    MST: completed    Implantable Devices (Port, Pacemaker, pain stimulator): no    Hip Replacement: no      [unfilled]  [facility]  Maintenance   Topic Date Due    HIV Screening  Never done    BMI: Followup Plan  Never done    Pneumococcal Vaccine: Pediatrics (0 to 5 Years) and At-Risk Patients (6 to 64 Years) (2 of 2 - PPSV23) 07/28/2017    Depression Screening  11/08/2025    Zoster Vaccine (2 of 2) 07/03/2025    Medicare Annual Wellness Visit (AWV)  11/08/2025    COVID-19 Vaccine (4 - Mixed Product risk 2024-25 season) 11/08/2025    Breast Cancer Screening: Mammogram  12/13/2025    BMI: Adult  05/14/2026    Colorectal Cancer Screening  12/19/2027    RSV Vaccine for Pregnant Patients and Patients Age 60+ Years (1 - 1-dose 75+ series) 12/10/2038    Hepatitis C Screening  Completed    Influenza Vaccine  Completed    Meningococcal B Vaccine  Aged Out    RSV Vaccine age 0-20 Months  Aged Out    HIB Vaccine  Aged Out    IPV Vaccine  Aged Out    Hepatitis A Vaccine  Aged Out    Meningococcal ACWY Vaccine  Aged Out    HPV Vaccine  Aged Out    Lung Cancer Screening  Discontinued     Past Medical History:   Diagnosis Date    Acute encephalopathy 06/26/2021    Anxiety 2/12/2025    Bimalleolar fracture of right ankle 06/11/2021    Breast cyst 1980    Cancer (HCC)     Chronic back pain     Fibrocystic breast     HTN (hypertension) 06/21/2021    Hypertension     Migraine     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    Non Hodgkin's lymphoma (HCC)     Obesity     Right leg DVT (HCC) 09/19/2013     Past Surgical History:   Procedure Laterality Date    BREAST BIOPSY  1980    [place]    BREAST CYST EXCISION Right     40 years ago    BREAST LUMPECTOMY  1980    BREAST LUMPECTOMY Right 4/22/2025    Procedure: RIGHT BREAST MELVA  BRACKETED LUMPECTOMY;  Surgeon: Luis Rivera MD;  Location[facility];  Service: Surgical Oncology    HYSTERECTOMY      LYMPH NODE BIOPSY      LYMPH NODE BIOPSY Right 4/22/2025    Procedure: LYMPHATIC MAPPING WITH BLUE AND RADIOACTIVE DYES, AXILLARY SENTINEL LYMPH NODE BIOPSY, INJECTION IN OR AT 1015 BY   CARDARELLI;  Surgeon: Luis Rivera MD;  Location: [facility];  Service: Surgical Oncology    OH OPEN TREATMENT BIMALLEOLAR ANKLE FRACTURE Right 06/21/2021    Procedure: OPEN REDUCTION W/ INTERNAL FIXATION (ORIF) RIGHT ANKLE BIMALLEOLAR FRACTURE;  Surgeon: Joe Bill MD;  Location: [facility];  Service: Orthopedics    TONSILLECTOMY      US BREAST CLIP NEEDLE LOC RIGHT Right 3/28/2025    US GUIDANCE BREAST BIOPSY RIGHT EACH ADDITIONAL Right 02/22/2025    US GUIDANCE BREAST BIOPSY RIGHT EACH ADDITIONAL Right 02/22/2025    US GUIDANCE BREAST BIOPSY RIGHT EACH ADDITIONAL Right 02/22/2025    US GUIDED BREAST BIOPSY RIGHT COMPLETE Right 02/22/2025     Family History   Problem Relation Age of Onset    Lung cancer Mother     COPD Mother     Skin cancer Father     Hypertension Father     Alcohol abuse Father     Depression Father     Diabetes Father     Breast cancer Maternal Aunt     Stomach cancer Maternal Grandmother     Cancer Maternal Grandmother     Completed Suicide  Maternal Grandfather     Ovarian cancer Neg Hx      Social History[1]   Current Medications[2]  Allergies[3]   There were no vitals filed for this visit.            [1]   Social History  Tobacco Use    Smoking status: Every Day     Current packs/day: 0.25     Types: Cigarettes    Smokeless tobacco: Never   Vaping Use    Vaping status: Never Used   Substance Use Topics    Alcohol use: Not Currently    Drug use: Not Currently   [2]   Current Outpatient Medications:     acetaminophen-codeine (TYLENOL with CODEINE #3) 300-30 MG per tablet, Take 1 tablet by mouth every 6 (six) hours as needed for moderate pain, Disp: 20 tablet, Rfl: 0    acetaminophen-codeine (TYLENOL with CODEINE #3) 300-30 MG per tablet, Take 1 tablet by mouth every 6 (six) hours as needed for moderate pain, Disp: 30 tablet, Rfl: 0    aspirin (ECOTRIN LOW STRENGTH) 81 mg EC tablet, Take 81 mg by mouth daily, Disp: , Rfl:     buprenorphine-naloxone (SUBOXONE) 8-2 mg per SL  tablet, place 1 tablet under the tongue and ALLOW TO dissolve once daily, Disp: , Rfl:     furosemide (LASIX) 20 mg tablet, take 1 tablet by mouth once daily, Disp: 90 tablet, Rfl: 5    potassium chloride (Klor-Con M20) 20 mEq tablet, take 1 tablet by mouth once daily, Disp: 90 tablet, Rfl: 3    QUEtiapine (SEROquel) 25 mg tablet, take 1 tablet by mouth at bedtime, Disp: 90 tablet, Rfl: 2  [3]   Allergies  Allergen Reactions    Morphine Hives    Prednisone      After 2 days, she had swollen tongue,     Trazodone Other (See Comments)     Nightmares

## 2025-05-20 NOTE — PROGRESS NOTES
..Oncology Teach:   Notes:  Information on Arimidex provided    PICC or Port Placement Needs:  Not needed    If PICC needed, was consent obtained?:  No    Patient Medication Education Reviewed:  Yes    Supportive Medication Reviewed:  Rx meds reviewed    Review when to call office vs. present to ED:  Yes    Assessment of patient understanding:  Pt demonstrates understanding    Chemo consent obtained?:  Yes

## 2025-05-28 ENCOUNTER — APPOINTMENT (OUTPATIENT)
Dept: RADIATION ONCOLOGY | Facility: CLINIC | Age: 60
End: 2025-05-28
Attending: RADIOLOGY
Payer: COMMERCIAL

## 2025-05-28 PROCEDURE — 77332 RADIATION TREATMENT AID(S): CPT | Performed by: RADIOLOGY

## 2025-05-28 PROCEDURE — 77290 THER RAD SIMULAJ FIELD CPLX: CPT | Performed by: RADIOLOGY

## 2025-05-30 PROCEDURE — 77334 RADIATION TREATMENT AID(S): CPT | Performed by: RADIOLOGY

## 2025-05-30 PROCEDURE — 77295 3-D RADIOTHERAPY PLAN: CPT | Performed by: RADIOLOGY

## 2025-05-30 PROCEDURE — 77300 RADIATION THERAPY DOSE PLAN: CPT | Performed by: RADIOLOGY

## 2025-06-09 ENCOUNTER — APPOINTMENT (OUTPATIENT)
Dept: RADIATION ONCOLOGY | Facility: CLINIC | Age: 60
End: 2025-06-09
Attending: RADIOLOGY
Payer: COMMERCIAL

## 2025-06-09 ENCOUNTER — APPOINTMENT (OUTPATIENT)
Dept: RADIATION ONCOLOGY | Facility: CLINIC | Age: 60
End: 2025-06-09
Payer: COMMERCIAL

## 2025-06-10 ENCOUNTER — APPOINTMENT (OUTPATIENT)
Dept: RADIATION ONCOLOGY | Facility: CLINIC | Age: 60
End: 2025-06-10
Attending: RADIOLOGY
Payer: COMMERCIAL

## 2025-06-10 LAB
RAD ONC ARIA COURSE FIRST TREATMENT DATE: NORMAL
RAD ONC ARIA COURSE ID: NORMAL
RAD ONC ARIA COURSE INTENT: NORMAL
RAD ONC ARIA COURSE LAST TREATMENT DATE: NORMAL
RAD ONC ARIA COURSE SESSION NUMBER: 1
RAD ONC ARIA COURSE TREATMENT ELAPSED DAYS: 0
RAD ONC ARIA PLAN FRACTIONS TREATED TO DATE: 1
RAD ONC ARIA PLAN ID: NORMAL
RAD ONC ARIA PLAN NAME: NORMAL
RAD ONC ARIA PLAN PRESCRIBED DOSE PER FRACTION: 5.2 GY
RAD ONC ARIA PLAN PRIMARY REFERENCE POINT: NORMAL
RAD ONC ARIA PLAN TOTAL FRACTIONS PRESCRIBED: 5
RAD ONC ARIA PLAN TOTAL PRESCRIBED DOSE: 2600 CGY
RAD ONC ARIA REFERENCE POINT DOSAGE GIVEN TO DATE: 5.2 GY
RAD ONC ARIA REFERENCE POINT ID: NORMAL
RAD ONC ARIA REFERENCE POINT SESSION DOSAGE GIVEN: 5.2 GY

## 2025-06-10 PROCEDURE — 77412 RADIATION TX DELIVERY LVL 3: CPT | Performed by: STUDENT IN AN ORGANIZED HEALTH CARE EDUCATION/TRAINING PROGRAM

## 2025-06-10 PROCEDURE — 77280 THER RAD SIMULAJ FIELD SMPL: CPT | Performed by: STUDENT IN AN ORGANIZED HEALTH CARE EDUCATION/TRAINING PROGRAM

## 2025-06-10 PROCEDURE — 77427 RADIATION TX MANAGEMENT X5: CPT | Performed by: STUDENT IN AN ORGANIZED HEALTH CARE EDUCATION/TRAINING PROGRAM

## 2025-06-11 ENCOUNTER — APPOINTMENT (OUTPATIENT)
Dept: RADIATION ONCOLOGY | Facility: CLINIC | Age: 60
End: 2025-06-11
Attending: RADIOLOGY
Payer: COMMERCIAL

## 2025-06-11 DIAGNOSIS — R11.0 NAUSEA: Primary | ICD-10-CM

## 2025-06-11 DIAGNOSIS — C50.911 INFILTRATING DUCTAL CARCINOMA OF RIGHT FEMALE BREAST (HCC): ICD-10-CM

## 2025-06-11 LAB
RAD ONC ARIA COURSE FIRST TREATMENT DATE: NORMAL
RAD ONC ARIA COURSE ID: NORMAL
RAD ONC ARIA COURSE INTENT: NORMAL
RAD ONC ARIA COURSE LAST TREATMENT DATE: NORMAL
RAD ONC ARIA COURSE SESSION NUMBER: 2
RAD ONC ARIA COURSE TREATMENT ELAPSED DAYS: 1
RAD ONC ARIA PLAN FRACTIONS TREATED TO DATE: 2
RAD ONC ARIA PLAN ID: NORMAL
RAD ONC ARIA PLAN NAME: NORMAL
RAD ONC ARIA PLAN PRESCRIBED DOSE PER FRACTION: 5.2 GY
RAD ONC ARIA PLAN PRIMARY REFERENCE POINT: NORMAL
RAD ONC ARIA PLAN TOTAL FRACTIONS PRESCRIBED: 5
RAD ONC ARIA PLAN TOTAL PRESCRIBED DOSE: 2600 CGY
RAD ONC ARIA REFERENCE POINT DOSAGE GIVEN TO DATE: 10.4 GY
RAD ONC ARIA REFERENCE POINT ID: NORMAL
RAD ONC ARIA REFERENCE POINT SESSION DOSAGE GIVEN: 5.2 GY

## 2025-06-11 PROCEDURE — 77331 SPECIAL RADIATION DOSIMETRY: CPT | Performed by: STUDENT IN AN ORGANIZED HEALTH CARE EDUCATION/TRAINING PROGRAM

## 2025-06-11 PROCEDURE — 77412 RADIATION TX DELIVERY LVL 3: CPT | Performed by: STUDENT IN AN ORGANIZED HEALTH CARE EDUCATION/TRAINING PROGRAM

## 2025-06-11 RX ORDER — ONDANSETRON 8 MG/1
8 TABLET, FILM COATED ORAL EVERY 8 HOURS PRN
COMMUNITY
End: 2025-06-11 | Stop reason: CLARIF

## 2025-06-11 RX ORDER — ONDANSETRON 8 MG/1
8 TABLET, FILM COATED ORAL EVERY 8 HOURS PRN
Qty: 20 TABLET | Refills: 0 | Status: SHIPPED | OUTPATIENT
Start: 2025-06-11

## 2025-06-11 NOTE — TELEPHONE ENCOUNTER
Patient C/O nausea since yesterday after radiation treatment. Denies vomiting. Vitals documented in [facility] and are stable. Dr. Aguayo aware. Will send Zofran to pharmacy. Patient aware.

## 2025-06-12 ENCOUNTER — APPOINTMENT (OUTPATIENT)
Dept: RADIATION ONCOLOGY | Facility: CLINIC | Age: 60
End: 2025-06-12
Attending: RADIOLOGY
Payer: COMMERCIAL

## 2025-06-12 LAB
RAD ONC ARIA COURSE FIRST TREATMENT DATE: NORMAL
RAD ONC ARIA COURSE ID: NORMAL
RAD ONC ARIA COURSE INTENT: NORMAL
RAD ONC ARIA COURSE LAST TREATMENT DATE: NORMAL
RAD ONC ARIA COURSE SESSION NUMBER: 3
RAD ONC ARIA COURSE TREATMENT ELAPSED DAYS: 2
RAD ONC ARIA PLAN FRACTIONS TREATED TO DATE: 3
RAD ONC ARIA PLAN ID: NORMAL
RAD ONC ARIA PLAN NAME: NORMAL
RAD ONC ARIA PLAN PRESCRIBED DOSE PER FRACTION: 5.2 GY
RAD ONC ARIA PLAN PRIMARY REFERENCE POINT: NORMAL
RAD ONC ARIA PLAN TOTAL FRACTIONS PRESCRIBED: 5
RAD ONC ARIA PLAN TOTAL PRESCRIBED DOSE: 2600 CGY
RAD ONC ARIA REFERENCE POINT DOSAGE GIVEN TO DATE: 15.6 GY
RAD ONC ARIA REFERENCE POINT ID: NORMAL
RAD ONC ARIA REFERENCE POINT SESSION DOSAGE GIVEN: 5.2 GY

## 2025-06-12 PROCEDURE — 77412 RADIATION TX DELIVERY LVL 3: CPT | Performed by: STUDENT IN AN ORGANIZED HEALTH CARE EDUCATION/TRAINING PROGRAM

## 2025-06-13 ENCOUNTER — APPOINTMENT (OUTPATIENT)
Dept: RADIATION ONCOLOGY | Facility: CLINIC | Age: 60
End: 2025-06-13
Attending: RADIOLOGY
Payer: COMMERCIAL

## 2025-06-13 LAB
RAD ONC ARIA COURSE FIRST TREATMENT DATE: NORMAL
RAD ONC ARIA COURSE ID: NORMAL
RAD ONC ARIA COURSE INTENT: NORMAL
RAD ONC ARIA COURSE LAST TREATMENT DATE: NORMAL
RAD ONC ARIA COURSE SESSION NUMBER: 4
RAD ONC ARIA COURSE TREATMENT ELAPSED DAYS: 3
RAD ONC ARIA PLAN FRACTIONS TREATED TO DATE: 4
RAD ONC ARIA PLAN ID: NORMAL
RAD ONC ARIA PLAN NAME: NORMAL
RAD ONC ARIA PLAN PRESCRIBED DOSE PER FRACTION: 5.2 GY
RAD ONC ARIA PLAN PRIMARY REFERENCE POINT: NORMAL
RAD ONC ARIA PLAN TOTAL FRACTIONS PRESCRIBED: 5
RAD ONC ARIA PLAN TOTAL PRESCRIBED DOSE: 2600 CGY
RAD ONC ARIA REFERENCE POINT DOSAGE GIVEN TO DATE: 20.8 GY
RAD ONC ARIA REFERENCE POINT ID: NORMAL
RAD ONC ARIA REFERENCE POINT SESSION DOSAGE GIVEN: 5.2 GY

## 2025-06-13 PROCEDURE — 77412 RADIATION TX DELIVERY LVL 3: CPT | Performed by: STUDENT IN AN ORGANIZED HEALTH CARE EDUCATION/TRAINING PROGRAM

## 2025-06-16 ENCOUNTER — APPOINTMENT (OUTPATIENT)
Dept: RADIATION ONCOLOGY | Facility: CLINIC | Age: 60
End: 2025-06-16
Attending: RADIOLOGY
Payer: COMMERCIAL

## 2025-06-16 LAB
RAD ONC ARIA COURSE FIRST TREATMENT DATE: NORMAL
RAD ONC ARIA COURSE ID: NORMAL
RAD ONC ARIA COURSE INTENT: NORMAL
RAD ONC ARIA COURSE LAST TREATMENT DATE: NORMAL
RAD ONC ARIA COURSE SESSION NUMBER: 5
RAD ONC ARIA COURSE TREATMENT ELAPSED DAYS: 6
RAD ONC ARIA PLAN FRACTIONS TREATED TO DATE: 5
RAD ONC ARIA PLAN ID: NORMAL
RAD ONC ARIA PLAN NAME: NORMAL
RAD ONC ARIA PLAN PRESCRIBED DOSE PER FRACTION: 5.2 GY
RAD ONC ARIA PLAN PRIMARY REFERENCE POINT: NORMAL
RAD ONC ARIA PLAN TOTAL FRACTIONS PRESCRIBED: 5
RAD ONC ARIA PLAN TOTAL PRESCRIBED DOSE: 2600 CGY
RAD ONC ARIA REFERENCE POINT DOSAGE GIVEN TO DATE: 26 GY
RAD ONC ARIA REFERENCE POINT ID: NORMAL
RAD ONC ARIA REFERENCE POINT SESSION DOSAGE GIVEN: 5.2 GY

## 2025-06-16 PROCEDURE — 77336 RADIATION PHYSICS CONSULT: CPT | Performed by: STUDENT IN AN ORGANIZED HEALTH CARE EDUCATION/TRAINING PROGRAM

## 2025-06-16 PROCEDURE — 77412 RADIATION TX DELIVERY LVL 3: CPT | Performed by: RADIOLOGY

## 2025-06-19 ENCOUNTER — PATIENT OUTREACH (OUTPATIENT)
Dept: HEMATOLOGY ONCOLOGY | Facility: CLINIC | Age: 60
End: 2025-06-19

## 2025-07-03 ENCOUNTER — PATIENT OUTREACH (OUTPATIENT)
Dept: HEMATOLOGY ONCOLOGY | Facility: CLINIC | Age: 60
End: 2025-07-03

## 2025-07-03 NOTE — PROGRESS NOTES
Patient Navigation     I did outreach Pt for the second time to review for any changes in barriers to care and other supportive services as needed.  A voicemail was left stating a reason for my call and my contact information was provided.  I requested a return call at patients earliest convenience.

## 2025-07-17 ENCOUNTER — PATIENT OUTREACH (OUTPATIENT)
Dept: HEMATOLOGY ONCOLOGY | Facility: CLINIC | Age: 60
End: 2025-07-17

## 2025-07-17 NOTE — PROGRESS NOTES
Patient Navigation     I did outreach Pt for the third time to review for any changes in barriers to care and other supportive services as needed.  A voicemail was left stating a reason for my call and my contact information was provided.  I requested a return call at patients earliest convenience.

## 2025-07-31 ENCOUNTER — DOCUMENTATION (OUTPATIENT)
Dept: HEMATOLOGY ONCOLOGY | Facility: CLINIC | Age: 60
End: 2025-07-31

## 2025-08-15 ENCOUNTER — TELEPHONE (OUTPATIENT)
Dept: SURGICAL ONCOLOGY | Facility: CLINIC | Age: 60
End: 2025-08-15

## 2025-08-19 ENCOUNTER — TELEPHONE (OUTPATIENT)
Dept: SURGICAL ONCOLOGY | Facility: CLINIC | Age: 60
End: 2025-08-19

## 2025-08-21 ENCOUNTER — TELEPHONE (OUTPATIENT)
Dept: HEMATOLOGY ONCOLOGY | Facility: CLINIC | Age: 60
End: 2025-08-21

## (undated) DEVICE — PADDING CAST 4 IN  COTTON STRL

## (undated) DEVICE — SUT VICRYL 2-0 SH 27 IN UNDYED J417H

## (undated) DEVICE — ELECTRODE BLADE MOD E-Z CLEAN  2.75IN 7CM -0012AM

## (undated) DEVICE — DRAPE SHEET THREE QUARTER

## (undated) DEVICE — PAD GROUNDING ADULT

## (undated) DEVICE — 4-PORT MANIFOLD: Brand: NEPTUNE 2

## (undated) DEVICE — INSULATED BLADE ELECTRODE: Brand: EDGE

## (undated) DEVICE — SHEATH, GUIDE, SAVI SCOUT®: Brand: SAVI SCOUT®

## (undated) DEVICE — SPONGE SCRUB 4 PCT CHLORHEXIDINE

## (undated) DEVICE — 1.25MM NON-THREADED GUIDE WIRE 150MM

## (undated) DEVICE — MAYO STAND COVER: Brand: CONVERTORS

## (undated) DEVICE — DRAPE C-ARMOUR

## (undated) DEVICE — PROXIMATE PLUS MD MULTI-DIRECTIONAL RELEASE SKIN STAPLERS CONTAINS 35 STAINLESS STEEL STAPLES APPROXIMATE CLOSED DIMENSIONS: 6.9MM X 3.9MM WIDE: Brand: PROXIMATE

## (undated) DEVICE — CHLORAPREP HI-LITE 26ML ORANGE

## (undated) DEVICE — SUT SILK 2-0 SH 30 IN K833H

## (undated) DEVICE — ANTIBACTERIAL UNDYED BRAIDED (POLYGLACTIN 910), SYNTHETIC ABSORBABLE SUTURE: Brand: COATED VICRYL

## (undated) DEVICE — 2.5MM DRILL BIT/QC/GOLD/180MM

## (undated) DEVICE — INTENDED FOR TISSUE SEPARATION, AND OTHER PROCEDURES THAT REQUIRE A SHARP SURGICAL BLADE TO PUNCTURE OR CUT.: Brand: BARD-PARKER SAFETY BLADES SIZE 15, STERILE

## (undated) DEVICE — SPONGE LAP 18 X 18 IN STRL RFD

## (undated) DEVICE — TOWEL SURG XR DETECT GREEN STRL RFD

## (undated) DEVICE — GLOVE SRG BIOGEL ORTHOPEDIC 7.5

## (undated) DEVICE — DRAPE EQUIPMENT RF WAND

## (undated) DEVICE — CAUTERY TIP POLISHER: Brand: DEVON

## (undated) DEVICE — ACE WRAP 6 IN UNSTERILE

## (undated) DEVICE — KERLIX BANDAGE ROLL: Brand: KERLIX

## (undated) DEVICE — 3M™ STERI-STRIP™ REINFORCED ADHESIVE SKIN CLOSURES, R1546, 1/4 IN X 4 IN (6 MM X 100 MM), 10 STRIPS/ENVELOPE: Brand: 3M™ STERI-STRIP™

## (undated) DEVICE — ADHESIVE SKIN CLOSURE SYS EXOFIN FUSION 22CM

## (undated) DEVICE — MARGIN MARKER SET

## (undated) DEVICE — SUT MONOCRYL 3-0 PS-2 18 IN Y497G

## (undated) DEVICE — 3.5MM CORTEX SCREW SELF-TAPPING 22MM: Type: IMPLANTABLE DEVICE | Site: ANKLE | Status: NON-FUNCTIONAL

## (undated) DEVICE — GLOVE INDICATOR PI UNDERGLOVE SZ 7.5 BLUE

## (undated) DEVICE — ACE WRAP 4 IN UNSTERILE

## (undated) DEVICE — BETHLEHEM UNIVERSAL MINOR GEN: Brand: CARDINAL HEALTH

## (undated) DEVICE — GLOVE SRG BIOGEL ORTHOPEDIC 8

## (undated) DEVICE — 4.0MM CANNULATED SCREW LONG THREAD/46MM: Type: IMPLANTABLE DEVICE | Site: ANKLE | Status: NON-FUNCTIONAL

## (undated) DEVICE — 3M™ STERI-DRAPE™ U-DRAPE 1015: Brand: STERI-DRAPE™

## (undated) DEVICE — 2.7MM CANNULATED DRILL BIT/QC 160MM

## (undated) DEVICE — CURITY NON-ADHERENT STRIPS: Brand: CURITY

## (undated) DEVICE — 3.5MM CORTEX SCREW SELF-TAPPING 16MM: Type: IMPLANTABLE DEVICE | Site: ANKLE | Status: NON-FUNCTIONAL

## (undated) DEVICE — TUBING SUCTION 5MM X 12 FT

## (undated) DEVICE — GLOVE INDICATOR PI UNDERGLOVE SZ 8 BLUE

## (undated) DEVICE — BETHLEHEM UNIVERSAL  MIONR EXT: Brand: CARDINAL HEALTH

## (undated) DEVICE — GAUZE SPONGES,16 PLY: Brand: CURITY

## (undated) DEVICE — 2.5MM DRILL BIT/QC/GOLD/110MM

## (undated) DEVICE — SUT VICRYL 2-0 CT-1 27 IN J259H

## (undated) DEVICE — ABDOMINAL PAD: Brand: DERMACEA

## (undated) DEVICE — SCD SEQUENTIAL COMPRESSION COMFORT SLEEVE MEDIUM KNEE LENGTH: Brand: KENDALL SCD

## (undated) DEVICE — BANDAGE, ESMARK LF STR 6"X9' (20/CS): Brand: CYPRESS

## (undated) DEVICE — 2.0MM DRILL BIT WITH DEPTH MARK/QC/140MM

## (undated) DEVICE — 3M™ IOBAN™ 2 ANTIMICROBIAL INCISE DRAPE 6650EZ: Brand: IOBAN™ 2

## (undated) DEVICE — PENCIL SMOKE EVAC TELESCOPING W/TUBING

## (undated) DEVICE — LIGHT HANDLE COVER SLEEVE DISP BLUE STELLAR

## (undated) DEVICE — POV-IOD SOLUTION 4OZ BT

## (undated) DEVICE — PENCIL ELECTROSURG E-Z CLEAN -0035H

## (undated) DEVICE — COBAN 4 IN STERILE

## (undated) DEVICE — SUT ETHILON 3-0 PS-1 18 IN 1663H

## (undated) DEVICE — DRAPE C-ARM X-RAY

## (undated) DEVICE — PACK UNIVERSAL DRAPES SUB-Q ICD